# Patient Record
Sex: MALE | Race: WHITE | NOT HISPANIC OR LATINO | Employment: OTHER | URBAN - METROPOLITAN AREA
[De-identification: names, ages, dates, MRNs, and addresses within clinical notes are randomized per-mention and may not be internally consistent; named-entity substitution may affect disease eponyms.]

---

## 2017-09-21 ENCOUNTER — OFFICE VISIT (OUTPATIENT)
Dept: ORTHOPEDIC SURGERY | Facility: CLINIC | Age: 82
End: 2017-09-21

## 2017-09-21 VITALS
DIASTOLIC BLOOD PRESSURE: 63 MMHG | HEART RATE: 59 BPM | WEIGHT: 168 LBS | BODY MASS INDEX: 25.46 KG/M2 | HEIGHT: 68 IN | SYSTOLIC BLOOD PRESSURE: 148 MMHG

## 2017-09-21 DIAGNOSIS — M17.0 PRIMARY OSTEOARTHRITIS OF BOTH KNEES: Primary | ICD-10-CM

## 2017-09-21 DIAGNOSIS — M25.561 PAIN IN BOTH KNEES, UNSPECIFIED CHRONICITY: ICD-10-CM

## 2017-09-21 DIAGNOSIS — F03.90 DEMENTIA WITHOUT BEHAVIORAL DISTURBANCE, UNSPECIFIED DEMENTIA TYPE: ICD-10-CM

## 2017-09-21 DIAGNOSIS — M25.562 PAIN IN BOTH KNEES, UNSPECIFIED CHRONICITY: ICD-10-CM

## 2017-09-21 PROCEDURE — 20610 DRAIN/INJ JOINT/BURSA W/O US: CPT | Performed by: ORTHOPAEDIC SURGERY

## 2017-09-21 PROCEDURE — 99203 OFFICE O/P NEW LOW 30 MIN: CPT | Performed by: ORTHOPAEDIC SURGERY

## 2017-09-21 RX ORDER — AMLODIPINE BESYLATE 2.5 MG/1
2.5 TABLET ORAL DAILY
COMMUNITY
Start: 2017-09-15

## 2017-09-21 RX ORDER — TRIAMCINOLONE ACETONIDE 40 MG/ML
40 INJECTION, SUSPENSION INTRA-ARTICULAR; INTRAMUSCULAR
Status: COMPLETED | OUTPATIENT
Start: 2017-09-21 | End: 2017-09-21

## 2017-09-21 RX ORDER — OMEPRAZOLE 40 MG/1
40 CAPSULE, DELAYED RELEASE ORAL EVERY EVENING
COMMUNITY
Start: 2017-09-15 | End: 2018-04-01 | Stop reason: HOSPADM

## 2017-09-21 RX ORDER — CYANOCOBALAMIN 1000 UG/ML
1000 INJECTION, SOLUTION INTRAMUSCULAR; SUBCUTANEOUS
COMMUNITY
Start: 2017-09-15 | End: 2018-06-26 | Stop reason: HOSPADM

## 2017-09-21 RX ORDER — ATORVASTATIN CALCIUM 10 MG/1
10 TABLET, FILM COATED ORAL EVERY MORNING
COMMUNITY

## 2017-09-21 RX ORDER — LIDOCAINE HYDROCHLORIDE 10 MG/ML
3 INJECTION, SOLUTION INFILTRATION; PERINEURAL
Status: COMPLETED | OUTPATIENT
Start: 2017-09-21 | End: 2017-09-21

## 2017-09-21 RX ORDER — LEVOTHYROXINE SODIUM 0.15 MG/1
175 TABLET ORAL
COMMUNITY
Start: 2017-09-15 | End: 2018-03-20

## 2017-09-21 RX ORDER — ATENOLOL 25 MG/1
25 TABLET ORAL DAILY
COMMUNITY
Start: 2017-07-26

## 2017-09-21 RX ADMIN — LIDOCAINE HYDROCHLORIDE 3 ML: 10 INJECTION, SOLUTION INFILTRATION; PERINEURAL at 09:52

## 2017-09-21 RX ADMIN — LIDOCAINE HYDROCHLORIDE 3 ML: 10 INJECTION, SOLUTION INFILTRATION; PERINEURAL at 09:55

## 2017-09-21 RX ADMIN — TRIAMCINOLONE ACETONIDE 40 MG: 40 INJECTION, SUSPENSION INTRA-ARTICULAR; INTRAMUSCULAR at 09:55

## 2017-09-21 RX ADMIN — TRIAMCINOLONE ACETONIDE 40 MG: 40 INJECTION, SUSPENSION INTRA-ARTICULAR; INTRAMUSCULAR at 09:52

## 2017-09-21 NOTE — PROGRESS NOTES
[facility] [place] [facility] Clinic Note    Subjective     Chief Complaint   Patient presents with   • Right Knee - Pain     Started many years ago.  Pain varies, gets worse with use  Takes Motrin BID, helps at night  Has had PT no improvement after  Has not tried cortisone     • Left Knee - Pain     Started maybe 5 years ago.  Pain varies, gets worse with use  Takes Motrin BID, helps at night  Has had PT no improvement after  Has not tried cortisone        HPI    Dakota Brizuela is a 87 y.o. male. Patient is an 87-year-old male who up to 3 years ago was playing golf.  Patient has had difficulty with his knees right greater than left.  He has tried ibuprofen without a lot of relief.  He was prescribed physical therapy and could not tolerated as this made him worse.  He has tried bracing but this has not been effective and he has difficulty getting the brace is on and off.  He is using a walking stick currently.  He is here with his daughter today.  His knee pain is episodic.  It does not hurt at rest but mainly when he is up bearing weight and active.  He seems to have a lot of night pain.  The knee pain is global and not focal.     Past Medical History:   Diagnosis Date   • Hypertension    • Thyroid disease       Past Surgical History:   Procedure Laterality Date   • APPENDECTOMY     • PROSTATE SURGERY        Family History   Problem Relation Age of Onset   • No Known Problems Mother    • No Known Problems Father    • Heart attack Brother      Social History     Social History   • Marital status:      Spouse name: N/A   • Number of children: N/A   • Years of education: N/A     Occupational History   • Not on file.     Social History Main Topics   • Smoking status: Never Smoker   • Smokeless tobacco: Never Used   • Alcohol use Yes      Comment: 1/2 glass red wine    • Drug use: No   • Sexual activity: Defer     Other Topics Concern   • Not on file     Social History Narrative   • No narrative on file      No  "current outpatient prescriptions on file prior to visit.     No current facility-administered medications on file prior to visit.       Allergies not on file     The following portions of the patient's history were reviewed and updated as appropriate: allergies, current medications, past family history, past medical history, past social history, past surgical history and problem list.    Review of Systems   HENT: Negative.    Eyes: Negative.    Respiratory: Negative.    Cardiovascular: Negative.    Gastrointestinal: Negative.    Endocrine: Negative.    Genitourinary: Negative.    Musculoskeletal: Positive for arthralgias (knees).   Skin: Negative.    Allergic/Immunologic: Negative.    Neurological: Positive for speech difficulty and weakness.   Hematological: Negative.    Psychiatric/Behavioral: Negative.         Objective      Physical Exam  /63  Pulse 59  Ht 67.5\" (171.5 cm)  Wt 168 lb (76.2 kg)  BMI 25.92 kg/m2    Body mass index is 25.92 kg/(m^2).    General  Mental Status - alert  General Appearance - cooperative, well groomed, not in acute distress  Orientation - Oriented X3  Build & Nutrition - well developed and well nourished  Posture - normal posture  Gait - normal gait     Integumentary  Global Assessment  Examination of related systems reveals - no lymphadenopathy  General Characteristics  Overall examination of the patient's skin reveals - no rashes, no evidence of scars, no suspicious lesions and no bruises.  Color - normal coloration of skin.    Ortho Exam  Peripheral Vascular:    Upper Extremity:   Inspection:  Left--no cyanotic nail beds Right--no cyanotic nail beds   Bilateral:  Pink nail beds with brisk capillary refill   Palpation:  Bilateral radial pulse normal    Musculoskeletal:  Global Assessment:  Overall assessment of Lower Extremity Muscle Strength and Tone:  Left quadriceps--5/5   Left hamstrings--5/5       Left tibialis anterior--5/5  Left gastroc-soleus--5/5  Left " EHL--5/5    Right quadriceps--5/5  Right hamstrings--5/5  Right tibialis anterior--5/5  Right gastroc soleus--5/5  Right EHL--5/5    Lower Extremity:  Knee/Patella:  No digital clubbing or cyanosis.    Examination of left and right knees reveals:  Normal deep tendon reflexes, coordination, strength, tone, sensation.  No known fractures or deformities.    Inspection and Palpation:    Left knee:  Tenderness:  Over the medial joint line and moderate severity  Effusion:  1+  Crepitus:  Positive  Pulses:  2+  Ecchymosis:  None  Warmth:  None     Right knee:  Tenderness:  Over the medial joint line and moderate severity  Effusion:  1+  Crepitus:  Positive  Pulses:  2+  Ecchymosis:  None  Warmth:  None     ROM:  Right:  Extension:5    Flexion:120  Left:  Extension:120     Flexion:120    Instability:    Left:  Lachman Test:  Negative, Varus stress test negative, Valgus stress test negative  Right:  Lachman Test:  Negative, Varus stress test negative, Valgus stress test negative    Deformities/Malalignments/Discrepancies:    Left:  Genu Varum   Right:  Genu Varum    Functional Testing:  Right:  Biju's test:  Negative  Patella grind test:  Positive  Q-angle:  Normal    Left:  Biju's test:  Negative  Patella grind test:  Positive  Q-angle:  normal    Imaging/Studies  X-rays of bilateral knees are taken in the office today and reviewed.  Patient has moderate to severe medial and patellofemoral compartment arthritis bilaterally.    Assessment:  1. Primary osteoarthritis of both knees    2. Pain in both knees, unspecified chronicity    3. Dementia without behavioral disturbance, unspecified dementia type        Plan:  1. We've a long discussion with the patient and his daughter today about treatment options and alternatives.  We will continue to use over-the-counter ibuprofen twice a day as long as it does not disturb his GI system.  2. We will inject him with corticosteroid today.  3. Follow-up in 6 weeks and we will  assess his response to the injection.  If he is no better, we can consider other modalities.  4. We will prescribe a rolling walker for him to prevent hopefully a catastrophic fall.      Medical Decision Making  Management Options : over-the-counter medicine and prescription/IM medicine  Data/Risk: radiology tests and independent visualization of imaging, lab tests, or EMG/NCV    Andriy Christiansen MD  09/21/17  9:53 AM

## 2017-09-21 NOTE — PROGRESS NOTES
Procedure   Large Joint Arthrocentesis  Date/Time: 9/21/2017 9:52 AM  Consent given by: patient  Site marked: site marked  Timeout: Immediately prior to procedure a time out was called to verify the correct patient, procedure, equipment, support staff and site/side marked as required   Supporting Documentation  Indications: pain   Procedure Details  Location: knee - L knee  Preparation: Patient was prepped and draped in the usual sterile fashion  Needle size: 25 G  Approach: anterolateral  Medications administered: 3 mL lidocaine 1 %; 40 mg triamcinolone acetonide 40 MG/ML  Patient tolerance: patient tolerated the procedure well with no immediate complications

## 2017-09-21 NOTE — PROGRESS NOTES
Procedure   Large Joint Arthrocentesis  Date/Time: 9/21/2017 9:55 AM  Consent given by: patient  Site marked: site marked  Timeout: Immediately prior to procedure a time out was called to verify the correct patient, procedure, equipment, support staff and site/side marked as required   Supporting Documentation  Indications: pain   Procedure Details  Location: knee - R knee  Preparation: Patient was prepped and draped in the usual sterile fashion  Needle size: 25 G  Approach: anterolateral  Medications administered: 3 mL lidocaine 1 %; 40 mg triamcinolone acetonide 40 MG/ML  Patient tolerance: patient tolerated the procedure well with no immediate complications

## 2017-11-02 ENCOUNTER — OFFICE VISIT (OUTPATIENT)
Dept: ORTHOPEDIC SURGERY | Facility: CLINIC | Age: 82
End: 2017-11-02

## 2017-11-02 VITALS
BODY MASS INDEX: 24.25 KG/M2 | SYSTOLIC BLOOD PRESSURE: 160 MMHG | HEART RATE: 64 BPM | HEIGHT: 68 IN | WEIGHT: 160 LBS | DIASTOLIC BLOOD PRESSURE: 74 MMHG

## 2017-11-02 DIAGNOSIS — M17.11 PRIMARY OSTEOARTHRITIS OF RIGHT KNEE: Primary | ICD-10-CM

## 2017-11-02 PROCEDURE — 20610 DRAIN/INJ JOINT/BURSA W/O US: CPT | Performed by: ORTHOPAEDIC SURGERY

## 2017-11-02 PROCEDURE — 99213 OFFICE O/P EST LOW 20 MIN: CPT | Performed by: ORTHOPAEDIC SURGERY

## 2017-11-02 RX ORDER — HYALURONATE SODIUM 10 MG/ML
20 SYRINGE (ML) INTRAARTICULAR
Status: COMPLETED | OUTPATIENT
Start: 2017-11-02 | End: 2017-11-02

## 2017-11-02 RX ORDER — LIDOCAINE HYDROCHLORIDE 10 MG/ML
3 INJECTION, SOLUTION INFILTRATION; PERINEURAL
Status: COMPLETED | OUTPATIENT
Start: 2017-11-02 | End: 2017-11-02

## 2017-11-02 RX ADMIN — Medication 20 MG: at 11:55

## 2017-11-02 RX ADMIN — LIDOCAINE HYDROCHLORIDE 3 ML: 10 INJECTION, SOLUTION INFILTRATION; PERINEURAL at 11:55

## 2017-11-02 NOTE — PROGRESS NOTES
Procedure   Large Joint Arthrocentesis  Date/Time: 11/2/2017 11:55 AM  Consent given by: patient  Site marked: site marked  Timeout: Immediately prior to procedure a time out was called to verify the correct patient, procedure, equipment, support staff and site/side marked as required   Supporting Documentation  Indications: pain   Procedure Details  Location: knee - R knee  Preparation: Patient was prepped and draped in the usual sterile fashion  Needle size: 22 G  Approach: superior  Medications administered: 3 mL lidocaine 1 %; 20 mg Sodium Hyaluronate 20 MG/2ML  Aspirate: clear (to verify intraarticular placement of the needle)  Patient tolerance: patient tolerated the procedure well with no immediate complications

## 2017-11-02 NOTE — PROGRESS NOTES
"    [facility] [place] Orthopaedic Surgery Clinic Note    Subjective     CC: Follow-up (6 weeks Bilateral knee )      HPI    Dakota Brizuela is a 87 y.o. male. Patient returns for follow-up of his bilateral knee arthritis.  He was injected in the right knee and left knee at his last visit.  His right knee continues to bother him a lot.  He tells me that his left knee never really bothered him.  He has fallen 2-3 days after the right knee was injected.  He is here with his daughter today for further evaluation and treatment.       ROS:    Constiutional:Pt denies fever, chills, nausea, or vomiting.  MSK:as above    Objective      Physical Exam  /74  Pulse 64  Ht 67.5\" (171.5 cm)  Wt 160 lb (72.6 kg)  BMI 24.69 kg/m2    Body mass index is 24.69 kg/(m^2).      Ortho Exam  Patient's right knee has genu varum  Positive crepitance with range of motion  No obvious joint line tenderness medially or laterally.  5 out of 5 strength of his extensor mechanism.  Patella is nontender although there is a bruise in the central aspect of the knee.    Imaging/Studies  None    Assessment:  1. Primary osteoarthritis of right knee        Plan:  1. We will begin a course of Euflexxa today  2. We have offered a brace and we have agreed that this would not be practical for him as he would have difficulty getting it on and off  3. Continue over-the-counter medication as needed for discomfort.  4. Follow-up in one week for Euflexxa No. 2.      Medical Decision Making  Management Options : over-the-counter medicine and prescription/IM medicine      Andriy Christiasnen MD  11/02/17  6:21 PM  "

## 2017-11-09 ENCOUNTER — CLINICAL SUPPORT (OUTPATIENT)
Dept: ORTHOPEDIC SURGERY | Facility: CLINIC | Age: 82
End: 2017-11-09

## 2017-11-09 DIAGNOSIS — M17.11 PRIMARY OSTEOARTHRITIS OF RIGHT KNEE: Primary | ICD-10-CM

## 2017-11-09 PROCEDURE — 20610 DRAIN/INJ JOINT/BURSA W/O US: CPT | Performed by: PHYSICIAN ASSISTANT

## 2017-11-09 RX ORDER — HYALURONATE SODIUM 10 MG/ML
20 SYRINGE (ML) INTRAARTICULAR
Status: COMPLETED | OUTPATIENT
Start: 2017-11-09 | End: 2017-11-09

## 2017-11-09 RX ADMIN — Medication 20 MG: at 14:46

## 2017-11-09 NOTE — PROGRESS NOTES
Subjective     Follow-up (1 week - #2 Euflexxa injection Rt knee)      Dakota Brizuela is a 87 y.o. male.     History of Present Illness   Patient comes in for the second injection of Euflexxa in the right knee.  He has tolerated previous injections well.  He reports no change in his knee pain.  No Known Allergies  Current Outpatient Prescriptions on File Prior to Visit   Medication Sig Dispense Refill   • amLODIPine (NORVASC) 2.5 MG tablet      • atenolol (TENORMIN) 25 MG tablet      • atorvastatin (LIPITOR) 10 MG tablet Take 10 mg by mouth Daily.     • cyanocobalamin 1000 MCG/ML injection      • levothyroxine (SYNTHROID, LEVOTHROID) 150 MCG tablet      • omeprazole (priLOSEC) 40 MG capsule        No current facility-administered medications on file prior to visit.      Social History     Social History   • Marital status:      Spouse name: N/A   • Number of children: N/A   • Years of education: N/A     Occupational History   • Not on file.     Social History Main Topics   • Smoking status: Never Smoker   • Smokeless tobacco: Never Used   • Alcohol use Yes      Comment: 1/2 glass red wine    • Drug use: No   • Sexual activity: Defer     Other Topics Concern   • Not on file     Social History Narrative     Past Surgical History:   Procedure Laterality Date   • APPENDECTOMY     • PROSTATE SURGERY       Family History   Problem Relation Age of Onset   • No Known Problems Mother    • No Known Problems Father    • Heart attack Brother      Past Medical History:   Diagnosis Date   • Hypertension    • Thyroid disease          Review of Systems    The following portions of the patient's history were reviewed and updated as appropriate: allergies, current medications, past family history, past medical history, past social history, past surgical history and problem list.    Ortho Exam      Medical Decision Making    Assessment and Plan/ Diagnosis/Treatment options:   Right knee arthritis undergoing Euflexxa series.   Patient is tolerated previous injections well.  No change knee pain.  Plan is proceed with second injection in the right knee today.  He'll return in 1 week or sooner if needed.    Using sterile technique, the right knee was sterilely prepped with Hibiclens.  Following time out using a 22 gauge needle, the right knee was aspirated and then injected with 2 ml Euflexxa.  Approximately 0.5 cc of straw-colored fluid was obtained.  Patient tolerated the procedure well. No complications

## 2017-11-09 NOTE — PROGRESS NOTES
Procedure   Large Joint Arthrocentesis  Date/Time: 11/9/2017 2:46 PM  Consent given by: patient  Site marked: site marked  Timeout: Immediately prior to procedure a time out was called to verify the correct patient, procedure, equipment, support staff and site/side marked as required   Supporting Documentation  Indications: pain   Procedure Details  Location: knee - R knee  Needle size: 22 G  Approach: anterolateral  Medications administered: 20 mg Sodium Hyaluronate 20 MG/2ML  Patient tolerance: patient tolerated the procedure well with no immediate complications

## 2017-11-16 ENCOUNTER — CLINICAL SUPPORT (OUTPATIENT)
Dept: ORTHOPEDIC SURGERY | Facility: CLINIC | Age: 82
End: 2017-11-16

## 2017-11-16 DIAGNOSIS — M17.11 PRIMARY OSTEOARTHRITIS OF RIGHT KNEE: Primary | ICD-10-CM

## 2017-11-16 PROCEDURE — 20610 DRAIN/INJ JOINT/BURSA W/O US: CPT | Performed by: PHYSICIAN ASSISTANT

## 2017-11-16 RX ORDER — HYALURONATE SODIUM 10 MG/ML
20 SYRINGE (ML) INTRAARTICULAR
Status: COMPLETED | OUTPATIENT
Start: 2017-11-16 | End: 2017-11-16

## 2017-11-16 RX ADMIN — Medication 20 MG: at 14:54

## 2017-11-16 NOTE — PROGRESS NOTES
Procedure   Large Joint Arthrocentesis  Date/Time: 11/16/2017 2:54 PM  Consent given by: patient  Site marked: site marked  Timeout: Immediately prior to procedure a time out was called to verify the correct patient, procedure, equipment, support staff and site/side marked as required   Supporting Documentation  Indications: pain   Procedure Details  Location: knee - R knee  Preparation: Patient was prepped and draped in the usual sterile fashion  Needle size: 22 G  Approach: anterolateral  Medications administered: 20 mg Sodium Hyaluronate 20 MG/2ML  Patient tolerance: patient tolerated the procedure well with no immediate complications

## 2017-11-16 NOTE — PROGRESS NOTES
Subjective     Injections (Right knee Euflexxa Injection #3)      Dakota Brizuela is a 87 y.o. male.     History of Present Illness   Patient returns today for the third injection of Euflexxa in the right knee.  He has tolerated previous injections well.  He reports no change in his knee pain.  No new symptoms.  No Known Allergies  Current Outpatient Prescriptions on File Prior to Visit   Medication Sig Dispense Refill   • amLODIPine (NORVASC) 2.5 MG tablet      • atenolol (TENORMIN) 25 MG tablet      • atorvastatin (LIPITOR) 10 MG tablet Take 10 mg by mouth Daily.     • cyanocobalamin 1000 MCG/ML injection      • levothyroxine (SYNTHROID, LEVOTHROID) 150 MCG tablet      • omeprazole (priLOSEC) 40 MG capsule        No current facility-administered medications on file prior to visit.      Social History     Social History   • Marital status:      Spouse name: N/A   • Number of children: N/A   • Years of education: N/A     Occupational History   • Not on file.     Social History Main Topics   • Smoking status: Never Smoker   • Smokeless tobacco: Never Used   • Alcohol use Yes      Comment: 1/2 glass red wine    • Drug use: No   • Sexual activity: Defer     Other Topics Concern   • Not on file     Social History Narrative     Past Surgical History:   Procedure Laterality Date   • APPENDECTOMY     • PROSTATE SURGERY       Family History   Problem Relation Age of Onset   • No Known Problems Mother    • No Known Problems Father    • Heart attack Brother      Past Medical History:   Diagnosis Date   • Hypertension    • Thyroid disease          Review of Systems    The following portions of the patient's history were reviewed and updated as appropriate: allergies, current medications, past family history, past medical history, past social history, past surgical history and problem list.    Ortho Exam      Medical Decision Making    Assessment and Plan/ Diagnosis/Treatment options:   Right knee arthritis undergoing  Euflexxa series patient is tolerated previous injections well.  Plan is to finish series in the right knee today he'll return in 6 weeks to see how he has progressed and discussed further treatment if needed.    Using sterile technique, the right knee was sterilely prepped with Hibiclens.  Following time out using a 22 gauge needle, the right knee was aspirated and then injected with 2 ml Euflexxa.  Approximately 0.5 cc of straw-colored fluid was obtained.  Patient tolerated the procedure well. No complications

## 2018-01-03 ENCOUNTER — OFFICE VISIT (OUTPATIENT)
Dept: ORTHOPEDIC SURGERY | Facility: CLINIC | Age: 83
End: 2018-01-03

## 2018-01-03 DIAGNOSIS — M17.11 PRIMARY OSTEOARTHRITIS OF RIGHT KNEE: Primary | ICD-10-CM

## 2018-01-03 PROCEDURE — 99212 OFFICE O/P EST SF 10 MIN: CPT | Performed by: PHYSICIAN ASSISTANT

## 2018-01-03 NOTE — PROGRESS NOTES
[facility] [place] [facility] Clinic Note    Subjective     Patient: Dakota Brizuela  : 1930    Primary Care Provider: Sam Hernandez MD    Requesting Provider: As above    Follow-up (7 weeks - Right knee osteoarthritis)      History    Chief Complaint: Follow-up right knee arthritis after Euflexxa 6 weeks ago.    History of Present Illness: Patient returns for routine follow-up after a first series of Euflexxa finishing 6 weeks ago.  Patient reports he has had improvement knee pain since finishing the series.  He reports that he did have better outcome with Euflexxa than he did with the steroid injection.  He continues to have some intermittent medial knee pain, however, it has improved.  No new symptoms.    Current Outpatient Prescriptions on File Prior to Visit   Medication Sig Dispense Refill   • amLODIPine (NORVASC) 2.5 MG tablet      • atenolol (TENORMIN) 25 MG tablet      • atorvastatin (LIPITOR) 10 MG tablet Take 10 mg by mouth Daily.     • cyanocobalamin 1000 MCG/ML injection      • levothyroxine (SYNTHROID, LEVOTHROID) 150 MCG tablet 175 mcg.     • omeprazole (priLOSEC) 40 MG capsule        No current facility-administered medications on file prior to visit.       No Known Allergies   Past Medical History:   Diagnosis Date   • Hypertension    • Thyroid disease      Past Surgical History:   Procedure Laterality Date   • APPENDECTOMY     • PROSTATE SURGERY       Family History   Problem Relation Age of Onset   • No Known Problems Mother    • No Known Problems Father    • Heart attack Brother       Social History     Social History   • Marital status:      Spouse name: N/A   • Number of children: N/A   • Years of education: N/A     Occupational History   • Not on file.     Social History Main Topics   • Smoking status: Never Smoker   • Smokeless tobacco: Never Used   • Alcohol use Yes      Comment: 1/2 glass red wine    • Drug use: No   • Sexual activity: Defer     Other Topics  Concern   • Not on file     Social History Narrative        Review of Systems    The following portions of the patient's history were reviewed and updated as appropriate: allergies, current medications, past family history, past medical history, past social history, past surgical history and problem list.      Objective      Physical Exam  There were no vitals taken for this visit.    There is no height or weight on file to calculate BMI.      Ortho Exam    Right Knee Exam  ----------  ALIGNMENT: Right: varus----------  RANGE OF MOTION:  Right: 0-120  LIGAMENTOUS STABILITY:   Right:stable to varus and valgus stress at terminal extension and 30 degrees without any evidence of laxity----------  STRENGTH:  KNEE FLEXION Right 5/5  KNEE EXTENSION Right 5/5 ----------  PAIN WITH PALPATION: Right medial joint line  PAIN WITH KNEE ROM: Right no  PATELLAR CREPITUS: Right yes   ----------  SENSATION TO LIGHT TOUCH:  DEEP PERONEAL/SUPERFICIAL PERONEAL/SURAL/SAPHENOUS/TIBIAL:   Right intact----------  EDEMA:  Right:  no;  ERYTHEMA:  Right: no;  WOUNDS/INCISIONS: none, no overlying skin problems.      Medical Decision Making    Data Review:   none    Assessment:  1. Primary osteoarthritis of right knee        Plan:  Doing well with nonoperative treatment of right knee arthritis.  I reviewed clinical findings, past and current treatment with the patient and his daughter today.  On exam, has mild medial joint line tenderness.  He reports improved pain since finishing the Euflexxa series.  We reviewed further treatment options including steroid injection between Euflexxa series.  I explained the Euflexxa every 6 months if it improves his pain.  He'll continue using the walker at home.  He also takes ibuprofen as needed.  Plan today is that he continue his current regimen and return to see us as needed.      Payton Peng PA-C  01/03/18  3:38 PM

## 2018-03-20 ENCOUNTER — APPOINTMENT (OUTPATIENT)
Dept: GENERAL RADIOLOGY | Facility: HOSPITAL | Age: 83
End: 2018-03-20

## 2018-03-20 ENCOUNTER — APPOINTMENT (OUTPATIENT)
Dept: CT IMAGING | Facility: HOSPITAL | Age: 83
End: 2018-03-20

## 2018-03-20 ENCOUNTER — HOSPITAL ENCOUNTER (INPATIENT)
Facility: HOSPITAL | Age: 83
LOS: 4 days | Discharge: NURSING, REHAB OR LONG-TERM CARE | End: 2018-03-24
Attending: EMERGENCY MEDICINE | Admitting: INTERNAL MEDICINE

## 2018-03-20 DIAGNOSIS — R13.13 PHARYNGEAL DYSPHAGIA: ICD-10-CM

## 2018-03-20 DIAGNOSIS — T79.6XXA TRAUMATIC RHABDOMYOLYSIS, INITIAL ENCOUNTER (HCC): Primary | ICD-10-CM

## 2018-03-20 DIAGNOSIS — G23.1 PROGRESSIVE SUPRANUCLEAR PALSY (HCC): ICD-10-CM

## 2018-03-20 DIAGNOSIS — Z74.09 IMPAIRED MOBILITY AND ADLS: ICD-10-CM

## 2018-03-20 DIAGNOSIS — R26.2 INABILITY TO WALK: ICD-10-CM

## 2018-03-20 DIAGNOSIS — Z78.9 IMPAIRED MOBILITY AND ADLS: ICD-10-CM

## 2018-03-20 DIAGNOSIS — Z74.09 IMPAIRED FUNCTIONAL MOBILITY, BALANCE, GAIT, AND ENDURANCE: ICD-10-CM

## 2018-03-20 PROBLEM — I10 HYPERTENSION: Status: ACTIVE | Noted: 2018-03-20

## 2018-03-20 PROBLEM — R73.9 HYPERGLYCEMIA: Status: ACTIVE | Noted: 2018-03-20

## 2018-03-20 PROBLEM — D72.829 LEUKOCYTOSIS: Status: ACTIVE | Noted: 2018-03-20

## 2018-03-20 PROBLEM — K21.9 GERD (GASTROESOPHAGEAL REFLUX DISEASE): Status: ACTIVE | Noted: 2018-03-20

## 2018-03-20 PROBLEM — G93.41 ACUTE METABOLIC ENCEPHALOPATHY: Status: ACTIVE | Noted: 2018-03-20

## 2018-03-20 PROBLEM — R77.8 ELEVATED TROPONIN: Status: ACTIVE | Noted: 2018-03-20

## 2018-03-20 PROBLEM — R79.89 LOW TSH LEVEL: Status: ACTIVE | Noted: 2018-03-20

## 2018-03-20 PROBLEM — D64.9 NORMOCYTIC ANEMIA: Status: ACTIVE | Noted: 2018-03-20

## 2018-03-20 PROBLEM — E78.5 HYPERLIPIDEMIA: Status: ACTIVE | Noted: 2018-03-20

## 2018-03-20 LAB
ALBUMIN SERPL-MCNC: 4.3 G/DL (ref 3.2–4.8)
ALBUMIN/GLOB SERPL: 1.6 G/DL (ref 1.5–2.5)
ALP SERPL-CCNC: 69 U/L (ref 25–100)
ALT SERPL W P-5'-P-CCNC: 41 U/L (ref 7–40)
ANION GAP SERPL CALCULATED.3IONS-SCNC: 11 MMOL/L (ref 3–11)
AST SERPL-CCNC: 88 U/L (ref 0–33)
BACTERIA UR QL AUTO: ABNORMAL /HPF
BASOPHILS # BLD AUTO: 0.01 10*3/MM3 (ref 0–0.2)
BASOPHILS NFR BLD AUTO: 0.1 % (ref 0–1)
BILIRUB SERPL-MCNC: 0.6 MG/DL (ref 0.3–1.2)
BILIRUB UR QL STRIP: NEGATIVE
BUN BLD-MCNC: 31 MG/DL (ref 9–23)
BUN/CREAT SERPL: 23.8 (ref 7–25)
CALCIUM SPEC-SCNC: 9.5 MG/DL (ref 8.7–10.4)
CHLORIDE SERPL-SCNC: 107 MMOL/L (ref 99–109)
CK SERPL-CCNC: 2845 U/L (ref 26–174)
CLARITY UR: ABNORMAL
CO2 SERPL-SCNC: 24 MMOL/L (ref 20–31)
COLOR UR: YELLOW
CREAT BLD-MCNC: 1.3 MG/DL (ref 0.6–1.3)
D-LACTATE SERPL-SCNC: 2.4 MMOL/L (ref 0.5–2)
DEPRECATED RDW RBC AUTO: 44.9 FL (ref 37–54)
EOSINOPHIL # BLD AUTO: 0 10*3/MM3 (ref 0–0.3)
EOSINOPHIL NFR BLD AUTO: 0 % (ref 0–3)
ERYTHROCYTE [DISTWIDTH] IN BLOOD BY AUTOMATED COUNT: 13.3 % (ref 11.3–14.5)
FLUAV SUBTYP SPEC NAA+PROBE: NOT DETECTED
FLUBV RNA ISLT QL NAA+PROBE: NOT DETECTED
GFR SERPL CREATININE-BSD FRML MDRD: 52 ML/MIN/1.73
GLOBULIN UR ELPH-MCNC: 2.7 GM/DL
GLUCOSE BLD-MCNC: 135 MG/DL (ref 70–100)
GLUCOSE UR STRIP-MCNC: NEGATIVE MG/DL
HCT VFR BLD AUTO: 37.3 % (ref 38.9–50.9)
HGB BLD-MCNC: 12.2 G/DL (ref 13.1–17.5)
HGB UR QL STRIP.AUTO: ABNORMAL
HOLD SPECIMEN: NORMAL
HYALINE CASTS UR QL AUTO: ABNORMAL /LPF
IMM GRANULOCYTES # BLD: 0.04 10*3/MM3 (ref 0–0.03)
IMM GRANULOCYTES NFR BLD: 0.3 % (ref 0–0.6)
KETONES UR QL STRIP: ABNORMAL
LEUKOCYTE ESTERASE UR QL STRIP.AUTO: NEGATIVE
LYMPHOCYTES # BLD AUTO: 0.49 10*3/MM3 (ref 0.6–4.8)
LYMPHOCYTES NFR BLD AUTO: 3.5 % (ref 24–44)
MAGNESIUM SERPL-MCNC: 2.2 MG/DL (ref 1.3–2.7)
MCH RBC QN AUTO: 30.3 PG (ref 27–31)
MCHC RBC AUTO-ENTMCNC: 32.7 G/DL (ref 32–36)
MCV RBC AUTO: 92.8 FL (ref 80–99)
MONOCYTES # BLD AUTO: 1.34 10*3/MM3 (ref 0–1)
MONOCYTES NFR BLD AUTO: 9.7 % (ref 0–12)
MYOGLOBIN SERPL-MCNC: 3861 NG/ML (ref 3–110)
NEUTROPHILS # BLD AUTO: 11.94 10*3/MM3 (ref 1.5–8.3)
NEUTROPHILS NFR BLD AUTO: 86.4 % (ref 41–71)
NITRITE UR QL STRIP: NEGATIVE
PH UR STRIP.AUTO: <=5 [PH] (ref 5–8)
PLATELET # BLD AUTO: 291 10*3/MM3 (ref 150–450)
PMV BLD AUTO: 10.6 FL (ref 6–12)
POTASSIUM BLD-SCNC: 3.7 MMOL/L (ref 3.5–5.5)
PROCALCITONIN SERPL-MCNC: 0.07 NG/ML
PROCALCITONIN SERPL-MCNC: 0.09 NG/ML
PROT SERPL-MCNC: 7 G/DL (ref 5.7–8.2)
PROT UR QL STRIP: ABNORMAL
RBC # BLD AUTO: 4.02 10*6/MM3 (ref 4.2–5.76)
RBC # UR: ABNORMAL /HPF
REF LAB TEST METHOD: ABNORMAL
SODIUM BLD-SCNC: 142 MMOL/L (ref 132–146)
SP GR UR STRIP: 1.03 (ref 1–1.03)
SQUAMOUS #/AREA URNS HPF: ABNORMAL /HPF
T4 FREE SERPL-MCNC: 1.66 NG/DL (ref 0.89–1.76)
TROPONIN I SERPL-MCNC: 0.05 NG/ML
TROPONIN I SERPL-MCNC: 0.08 NG/ML
TSH SERPL DL<=0.05 MIU/L-ACNC: 0.14 MIU/ML (ref 0.35–5.35)
UROBILINOGEN UR QL STRIP: ABNORMAL
WBC NRBC COR # BLD: 13.82 10*3/MM3 (ref 3.5–10.8)
WBC UR QL AUTO: ABNORMAL /HPF

## 2018-03-20 PROCEDURE — 84443 ASSAY THYROID STIM HORMONE: CPT | Performed by: EMERGENCY MEDICINE

## 2018-03-20 PROCEDURE — G0378 HOSPITAL OBSERVATION PER HR: HCPCS

## 2018-03-20 PROCEDURE — 83874 ASSAY OF MYOGLOBIN: CPT | Performed by: EMERGENCY MEDICINE

## 2018-03-20 PROCEDURE — 84484 ASSAY OF TROPONIN QUANT: CPT | Performed by: PHYSICIAN ASSISTANT

## 2018-03-20 PROCEDURE — 83735 ASSAY OF MAGNESIUM: CPT | Performed by: EMERGENCY MEDICINE

## 2018-03-20 PROCEDURE — 87502 INFLUENZA DNA AMP PROBE: CPT | Performed by: EMERGENCY MEDICINE

## 2018-03-20 PROCEDURE — 84439 ASSAY OF FREE THYROXINE: CPT | Performed by: EMERGENCY MEDICINE

## 2018-03-20 PROCEDURE — 73560 X-RAY EXAM OF KNEE 1 OR 2: CPT

## 2018-03-20 PROCEDURE — 99285 EMERGENCY DEPT VISIT HI MDM: CPT

## 2018-03-20 PROCEDURE — 71045 X-RAY EXAM CHEST 1 VIEW: CPT

## 2018-03-20 PROCEDURE — 85025 COMPLETE CBC W/AUTO DIFF WBC: CPT | Performed by: EMERGENCY MEDICINE

## 2018-03-20 PROCEDURE — 87040 BLOOD CULTURE FOR BACTERIA: CPT | Performed by: EMERGENCY MEDICINE

## 2018-03-20 PROCEDURE — 99223 1ST HOSP IP/OBS HIGH 75: CPT | Performed by: FAMILY MEDICINE

## 2018-03-20 PROCEDURE — 80053 COMPREHEN METABOLIC PANEL: CPT | Performed by: EMERGENCY MEDICINE

## 2018-03-20 PROCEDURE — 84484 ASSAY OF TROPONIN QUANT: CPT | Performed by: EMERGENCY MEDICINE

## 2018-03-20 PROCEDURE — 70450 CT HEAD/BRAIN W/O DYE: CPT

## 2018-03-20 PROCEDURE — 84145 PROCALCITONIN (PCT): CPT | Performed by: PHYSICIAN ASSISTANT

## 2018-03-20 PROCEDURE — 82550 ASSAY OF CK (CPK): CPT | Performed by: EMERGENCY MEDICINE

## 2018-03-20 PROCEDURE — 72170 X-RAY EXAM OF PELVIS: CPT

## 2018-03-20 PROCEDURE — 93005 ELECTROCARDIOGRAM TRACING: CPT | Performed by: EMERGENCY MEDICINE

## 2018-03-20 PROCEDURE — 84145 PROCALCITONIN (PCT): CPT | Performed by: EMERGENCY MEDICINE

## 2018-03-20 PROCEDURE — P9612 CATHETERIZE FOR URINE SPEC: HCPCS

## 2018-03-20 PROCEDURE — 83605 ASSAY OF LACTIC ACID: CPT | Performed by: EMERGENCY MEDICINE

## 2018-03-20 PROCEDURE — 81001 URINALYSIS AUTO W/SCOPE: CPT | Performed by: EMERGENCY MEDICINE

## 2018-03-20 RX ORDER — ATORVASTATIN CALCIUM 10 MG/1
10 TABLET, FILM COATED ORAL EVERY MORNING
Status: DISCONTINUED | OUTPATIENT
Start: 2018-03-21 | End: 2018-03-24 | Stop reason: HOSPADM

## 2018-03-20 RX ORDER — LEVOTHYROXINE SODIUM 175 UG/1
175 TABLET ORAL DAILY
COMMUNITY

## 2018-03-20 RX ORDER — SODIUM CHLORIDE 9 MG/ML
100 INJECTION, SOLUTION INTRAVENOUS CONTINUOUS
Status: DISCONTINUED | OUTPATIENT
Start: 2018-03-20 | End: 2018-03-23

## 2018-03-20 RX ORDER — ACETAMINOPHEN 325 MG/1
650 TABLET ORAL EVERY 4 HOURS PRN
Status: DISCONTINUED | OUTPATIENT
Start: 2018-03-20 | End: 2018-03-24 | Stop reason: HOSPADM

## 2018-03-20 RX ORDER — PANTOPRAZOLE SODIUM 40 MG/1
40 TABLET, DELAYED RELEASE ORAL
Status: DISCONTINUED | OUTPATIENT
Start: 2018-03-21 | End: 2018-03-24 | Stop reason: HOSPADM

## 2018-03-20 RX ORDER — SODIUM CHLORIDE 0.9 % (FLUSH) 0.9 %
1-10 SYRINGE (ML) INJECTION AS NEEDED
Status: DISCONTINUED | OUTPATIENT
Start: 2018-03-20 | End: 2018-03-24 | Stop reason: HOSPADM

## 2018-03-20 RX ORDER — LEVOTHYROXINE SODIUM 0.07 MG/1
150 TABLET ORAL
Status: DISCONTINUED | OUTPATIENT
Start: 2018-03-21 | End: 2018-03-24 | Stop reason: HOSPADM

## 2018-03-20 RX ORDER — AMLODIPINE BESYLATE 2.5 MG/1
2.5 TABLET ORAL DAILY
Status: DISCONTINUED | OUTPATIENT
Start: 2018-03-21 | End: 2018-03-24 | Stop reason: HOSPADM

## 2018-03-20 RX ORDER — SODIUM CHLORIDE 0.9 % (FLUSH) 0.9 %
10 SYRINGE (ML) INJECTION AS NEEDED
Status: DISCONTINUED | OUTPATIENT
Start: 2018-03-20 | End: 2018-03-24 | Stop reason: HOSPADM

## 2018-03-20 RX ORDER — ATENOLOL 25 MG/1
25 TABLET ORAL DAILY
Status: DISCONTINUED | OUTPATIENT
Start: 2018-03-21 | End: 2018-03-23

## 2018-03-20 RX ORDER — SODIUM CHLORIDE 9 MG/ML
100 INJECTION, SOLUTION INTRAVENOUS CONTINUOUS
Status: DISCONTINUED | OUTPATIENT
Start: 2018-03-20 | End: 2018-03-21

## 2018-03-20 RX ORDER — SODIUM CHLORIDE 9 MG/ML
125 INJECTION, SOLUTION INTRAVENOUS CONTINUOUS
Status: DISCONTINUED | OUTPATIENT
Start: 2018-03-20 | End: 2018-03-21

## 2018-03-20 RX ADMIN — SODIUM CHLORIDE 100 ML/HR: 9 INJECTION, SOLUTION INTRAVENOUS at 21:58

## 2018-03-20 RX ADMIN — SODIUM CHLORIDE 1000 ML: 9 INJECTION, SOLUTION INTRAVENOUS at 16:35

## 2018-03-20 NOTE — ED PROVIDER NOTES
Subjective   Dakota Brizuela is a 87 y.o.male who presents to the ED s/p fall which occurred at 0330 today. The pt reports that he woke up earlier than normal this morning and suffered a fall secondary to his inability to use his walker. Family states that they found the pt lying on the floor at which point EMS was dispatched. Family states that the pt was down for at least 12 hours in the semi-prone position on a carpeted floor. Family also states that the pt developed abrasions to his bilateral lower extremities from crawling on the floor. Family also reports that the pt has developed more confusion than baseline and difficulty speaking more than baseline. The pt denies headache, or any other complaints at this time. Family notes that the pt's health has been deteriorating significantly within the past year.        History provided by:  EMS personnel, patient and relative  Fall   Mechanism of injury: fall    Incident location:  Home  Fall:     Fall occurred:  Unable to specify    Impact surface:  Carpet    Point of impact:  Unable to specify  Protective equipment: none    Suspicion of alcohol use: no    Suspicion of drug use: no    Tetanus status:  Unknown  Prior to arrival data:     Bystander interventions:  None    Blood loss:  None    Loss of consciousness: no      Amnesic to event: no    Associated symptoms: no headaches        Review of Systems   Musculoskeletal: Negative for arthralgias.   Skin:        Abrasions to bilateral lower extremities.    Neurological: Positive for speech difficulty. Negative for headaches.   Psychiatric/Behavioral: Positive for confusion.   All other systems reviewed and are negative.      Past Medical History:   Diagnosis Date   • Hypertension    • Thyroid disease        No Known Allergies    Past Surgical History:   Procedure Laterality Date   • APPENDECTOMY     • PROSTATE SURGERY         Family History   Problem Relation Age of Onset   • No Known Problems Mother    • No Known  Problems Father    • Heart attack Brother        Social History     Social History   • Marital status:      Social History Main Topics   • Smoking status: Never Smoker   • Smokeless tobacco: Never Used   • Alcohol use Yes      Comment: 1/2 glass red wine    • Drug use: No   • Sexual activity: Defer     Other Topics Concern   • Not on file         Objective   Physical Exam   Constitutional: He is oriented to person, place, and time. He appears well-developed and well-nourished. No distress.   HENT:   Head: Normocephalic and atraumatic.   Nose: Nose normal.   Scalp non tender.   Eyes: Conjunctivae are normal. No scleral icterus.   Small conjunctival hemorrhage to left eye.   Neck: Normal range of motion. Neck supple.   Cardiovascular: Normal rate, regular rhythm and normal heart sounds.    No murmur heard.  Pulmonary/Chest: Effort normal and breath sounds normal. No respiratory distress.   Musculoskeletal:   Pelvis stable. Hips have normal ROM. Pt has symmetric strength in bilateral upper and lower extremities. Strong . C-spine and clavicle are non tender. No bony tenderness.   Neurological: He is alert and oriented to person, place, and time.   Answers questions appropriately.   Skin: Skin is warm and dry.   Abrasions to bilateral knees.   Nursing note and vitals reviewed.      Procedures         ED Course  ED Course     EKG nearly unreadable due to tremor artifact.  Pt's daughter arrived, says he has PSP and has been having trouble walking and talking of late.  Labs reveal rhabdomyolysis and overcorrected thyroid but otherwise benign.  XRs negative for injury.  Patient stable on serial rechecks.  Discussed exam findings, test results so far and concerns in detail at the bedside.  Discussed need for admission for further evaluation and treatment.                  MDM  Number of Diagnoses or Management Options  Inability to walk:   Progressive supranuclear palsy:   Traumatic rhabdomyolysis, initial  encounter:      Amount and/or Complexity of Data Reviewed  Clinical lab tests: ordered and reviewed  Tests in the radiology section of CPT®: ordered and reviewed  Decide to obtain previous medical records or to obtain history from someone other than the patient: yes  Obtain history from someone other than the patient: yes  Discuss the patient with other providers: yes  Independent visualization of images, tracings, or specimens: yes        Final diagnoses:   Traumatic rhabdomyolysis, initial encounter   Inability to walk   Progressive supranuclear palsy       Documentation assistance provided by claudia Friedman.  Information recorded by the claudia was done at my direction and has been verified and validated by me.     Chato Friedman  03/20/18 1658       Chato Friedman  03/20/18 1751       Chato Friedman  03/20/18 1821       Jeremiah Dunlap MD  03/20/18 9114

## 2018-03-21 ENCOUNTER — APPOINTMENT (OUTPATIENT)
Dept: GENERAL RADIOLOGY | Facility: HOSPITAL | Age: 83
End: 2018-03-21
Attending: HOSPITALIST

## 2018-03-21 LAB
ANION GAP SERPL CALCULATED.3IONS-SCNC: 5 MMOL/L (ref 3–11)
BUN BLD-MCNC: 24 MG/DL (ref 9–23)
BUN/CREAT SERPL: 20 (ref 7–25)
CA-I SERPL ISE-MCNC: 1.21 MMOL/L (ref 1.12–1.32)
CALCIUM SPEC-SCNC: 8.7 MG/DL (ref 8.7–10.4)
CHLORIDE SERPL-SCNC: 115 MMOL/L (ref 99–109)
CK SERPL-CCNC: 3677 U/L (ref 26–174)
CO2 SERPL-SCNC: 24 MMOL/L (ref 20–31)
CREAT BLD-MCNC: 1.2 MG/DL (ref 0.6–1.3)
D-LACTATE SERPL-SCNC: 0.9 MMOL/L (ref 0.5–2)
DEPRECATED RDW RBC AUTO: 46.2 FL (ref 37–54)
ERYTHROCYTE [DISTWIDTH] IN BLOOD BY AUTOMATED COUNT: 13.5 % (ref 11.3–14.5)
GFR SERPL CREATININE-BSD FRML MDRD: 57 ML/MIN/1.73
GLUCOSE BLD-MCNC: 97 MG/DL (ref 70–100)
HBA1C MFR BLD: 5.8 % (ref 4.8–5.6)
HCT VFR BLD AUTO: 33 % (ref 38.9–50.9)
HGB BLD-MCNC: 10.7 G/DL (ref 13.1–17.5)
MCH RBC QN AUTO: 30.4 PG (ref 27–31)
MCHC RBC AUTO-ENTMCNC: 32.4 G/DL (ref 32–36)
MCV RBC AUTO: 93.8 FL (ref 80–99)
PLATELET # BLD AUTO: 255 10*3/MM3 (ref 150–450)
PMV BLD AUTO: 10.1 FL (ref 6–12)
POTASSIUM BLD-SCNC: 3.4 MMOL/L (ref 3.5–5.5)
POTASSIUM BLD-SCNC: 3.6 MMOL/L (ref 3.5–5.5)
RBC # BLD AUTO: 3.52 10*6/MM3 (ref 4.2–5.76)
SODIUM BLD-SCNC: 144 MMOL/L (ref 132–146)
TROPONIN I SERPL-MCNC: 0.09 NG/ML
WBC NRBC COR # BLD: 9.66 10*3/MM3 (ref 3.5–10.8)

## 2018-03-21 PROCEDURE — 83605 ASSAY OF LACTIC ACID: CPT | Performed by: PHYSICIAN ASSISTANT

## 2018-03-21 PROCEDURE — 97530 THERAPEUTIC ACTIVITIES: CPT

## 2018-03-21 PROCEDURE — 82550 ASSAY OF CK (CPK): CPT | Performed by: HOSPITALIST

## 2018-03-21 PROCEDURE — 83036 HEMOGLOBIN GLYCOSYLATED A1C: CPT | Performed by: PHYSICIAN ASSISTANT

## 2018-03-21 PROCEDURE — 92610 EVALUATE SWALLOWING FUNCTION: CPT

## 2018-03-21 PROCEDURE — 99233 SBSQ HOSP IP/OBS HIGH 50: CPT | Performed by: HOSPITALIST

## 2018-03-21 PROCEDURE — 84132 ASSAY OF SERUM POTASSIUM: CPT | Performed by: HOSPITALIST

## 2018-03-21 PROCEDURE — 74230 X-RAY XM SWLNG FUNCJ C+: CPT

## 2018-03-21 PROCEDURE — 97116 GAIT TRAINING THERAPY: CPT

## 2018-03-21 PROCEDURE — 82330 ASSAY OF CALCIUM: CPT | Performed by: HOSPITALIST

## 2018-03-21 PROCEDURE — 84484 ASSAY OF TROPONIN QUANT: CPT | Performed by: PHYSICIAN ASSISTANT

## 2018-03-21 PROCEDURE — 25010000002 HEPARIN (PORCINE) PER 1000 UNITS: Performed by: HOSPITALIST

## 2018-03-21 PROCEDURE — 85027 COMPLETE CBC AUTOMATED: CPT | Performed by: PHYSICIAN ASSISTANT

## 2018-03-21 PROCEDURE — 97162 PT EVAL MOD COMPLEX 30 MIN: CPT

## 2018-03-21 PROCEDURE — 80048 BASIC METABOLIC PNL TOTAL CA: CPT | Performed by: HOSPITALIST

## 2018-03-21 PROCEDURE — 92611 MOTION FLUOROSCOPY/SWALLOW: CPT

## 2018-03-21 PROCEDURE — 25010000002 PIPERACILLIN-TAZOBACTAM: Performed by: FAMILY MEDICINE

## 2018-03-21 PROCEDURE — 97166 OT EVAL MOD COMPLEX 45 MIN: CPT

## 2018-03-21 RX ORDER — HEPARIN SODIUM 5000 [USP'U]/ML
5000 INJECTION, SOLUTION INTRAVENOUS; SUBCUTANEOUS EVERY 8 HOURS SCHEDULED
Status: DISCONTINUED | OUTPATIENT
Start: 2018-03-21 | End: 2018-03-24 | Stop reason: HOSPADM

## 2018-03-21 RX ORDER — FINASTERIDE 5 MG/1
5 TABLET, FILM COATED ORAL DAILY
Status: DISCONTINUED | OUTPATIENT
Start: 2018-03-21 | End: 2018-03-24 | Stop reason: HOSPADM

## 2018-03-21 RX ORDER — POTASSIUM CHLORIDE 1.5 G/1.77G
40 POWDER, FOR SOLUTION ORAL AS NEEDED
Status: DISCONTINUED | OUTPATIENT
Start: 2018-03-21 | End: 2018-03-23

## 2018-03-21 RX ORDER — POTASSIUM CHLORIDE 750 MG/1
40 CAPSULE, EXTENDED RELEASE ORAL AS NEEDED
Status: DISCONTINUED | OUTPATIENT
Start: 2018-03-21 | End: 2018-03-23

## 2018-03-21 RX ORDER — TAMSULOSIN HYDROCHLORIDE 0.4 MG/1
0.4 CAPSULE ORAL DAILY
Status: DISCONTINUED | OUTPATIENT
Start: 2018-03-21 | End: 2018-03-24 | Stop reason: HOSPADM

## 2018-03-21 RX ADMIN — PANTOPRAZOLE SODIUM 40 MG: 40 TABLET, DELAYED RELEASE ORAL at 14:15

## 2018-03-21 RX ADMIN — BARIUM SULFATE 10 ML: 400 PASTE ORAL at 12:27

## 2018-03-21 RX ADMIN — AMLODIPINE BESYLATE 2.5 MG: 2.5 TABLET ORAL at 14:14

## 2018-03-21 RX ADMIN — POTASSIUM CHLORIDE 40 MEQ: 750 CAPSULE, EXTENDED RELEASE ORAL at 18:19

## 2018-03-21 RX ADMIN — TAMSULOSIN HYDROCHLORIDE 0.4 MG: 0.4 CAPSULE ORAL at 18:19

## 2018-03-21 RX ADMIN — LEVOTHYROXINE SODIUM 150 MCG: 75 TABLET ORAL at 14:15

## 2018-03-21 RX ADMIN — BARIUM SULFATE 55 ML: 0.81 POWDER, FOR SUSPENSION ORAL at 12:27

## 2018-03-21 RX ADMIN — HEPARIN SODIUM 5000 UNITS: 5000 INJECTION, SOLUTION INTRAVENOUS; SUBCUTANEOUS at 20:39

## 2018-03-21 RX ADMIN — SODIUM CHLORIDE 200 ML/HR: 9 INJECTION, SOLUTION INTRAVENOUS at 14:16

## 2018-03-21 RX ADMIN — ATORVASTATIN CALCIUM 10 MG: 10 TABLET, FILM COATED ORAL at 14:15

## 2018-03-21 RX ADMIN — TAZOBACTAM SODIUM AND PIPERACILLIN SODIUM 4.5 G: .5; 4 INJECTION, POWDER, LYOPHILIZED, FOR SOLUTION INTRAVENOUS at 01:25

## 2018-03-21 RX ADMIN — TAZOBACTAM SODIUM AND PIPERACILLIN SODIUM 4.5 G: .5; 4 INJECTION, POWDER, LYOPHILIZED, FOR SOLUTION INTRAVENOUS at 15:28

## 2018-03-21 RX ADMIN — TAZOBACTAM SODIUM AND PIPERACILLIN SODIUM 4.5 G: .5; 4 INJECTION, POWDER, LYOPHILIZED, FOR SOLUTION INTRAVENOUS at 05:49

## 2018-03-21 RX ADMIN — FINASTERIDE 5 MG: 5 TABLET, FILM COATED ORAL at 18:19

## 2018-03-21 RX ADMIN — POTASSIUM CHLORIDE 40 MEQ: 750 CAPSULE, EXTENDED RELEASE ORAL at 14:16

## 2018-03-21 NOTE — H&P
"    [facility] Medicine Services  HISTORY AND PHYSICAL    Patient Name: Dakota Brizuela  : 1930  MRN: [id number]  Primary Care Physician: Sam Hernandez MD    Subjective   Subjective     Chief Complaint:  AMS    HPI:  Dakota Brizuela is an 87 y.o. male with a past medical history significant for hypertension, HLD, hypothyroidism, and PSP who presents s/p fall early this morning around 0330. HPI limited secondary to patient disposition. Patient lives at home alone. Daughter at bedside states she came over to check on patient this morning after leaving him the night before. States she found him on the floor in a semi-prone position.The patient apparently revealed to his daughter that he lost balance with his walker and subsequently sustained a mechanical fall onto a carpeted floor.  He had diffuse bruising from crawling all night and was down for approximately 12 hours. Daughter also endorses acute change in mental status. States he is hallucinating and \"seeing things on the ceiling\". She explains that patient has suffered functional and cognitive decline over the past year and has admitted that he may need to start using a wheelchair. However, when speaking to medical staff, patient is ambivalent to reveal hardships and per daughter \"tells half truths\" as he is afraid of permanent placement. No complaints of cough, congestion, fever, chest pain, SOB, dysuria, or N/V/D. Patient also denies syncope or focal deficits. Will admit for further evaluation and treatment.    Emergency Department Evaluation; Ck elevated to 2800. Myoglobin 3800. Troponin 0.05. Blood glucose increased to 135. TSH 0.144. Lactic acid 2.4. EBC 14,000. H/H low at 12.2 and 37.3. UA negative. EKG without acute changes but with artifact secondary to tremor.    Review of Systems (limited per patient disposition)      Otherwise 10-system ROS reviewed and is negative except as mentioned in the HPI.    Personal " History     Past Medical History:   Diagnosis Date   • Hypertension    • Thyroid disease        Past Surgical History:   Procedure Laterality Date   • APPENDECTOMY     • PROSTATE SURGERY         Family History: family history includes Heart attack in his brother; No Known Problems in his father and mother.     Social History:  reports that he has never smoked. He has never used smokeless tobacco. He reports that he drinks alcohol. He reports that he does not use drugs.  Social History     Social History Narrative   • No narrative on file       Medications:    (Not in a hospital admission)    No Known Allergies    Objective   Objective     Vital Signs:   Temp:  [99.5 °F (37.5 °C)] 99.5 °F (37.5 °C)  Heart Rate:  [102] 102  Resp:  [20] 20  BP: (112-162)/(64-88) 131/80        Physical Exam   Constitutional: No acute distress, awake, alert, disoriented, speech difficulty  Eyes: PERRLA, sclerae anicteric, no conjunctival injection  HENT: NCAT, mucous membranes moist  Neck: Supple, no thyromegaly, no lymphadenopathy, trachea midline  Respiratory: Clear to auscultation bilaterally, nonlabored respirations   Cardiovascular: RRR, no murmurs, rubs, or gallops, palpable pedal pulses bilaterally  Gastrointestinal: Positive bowel sounds, soft, nontender, nondistended  Musculoskeletal: No bilateral ankle edema, no clubbing or cyanosis to extremities  Psychiatric: Appropriate affect, cooperative  Neurologic: Oriented x 3, strength symmetric in all extremities, Cranial Nerves grossly intact to confrontation, speech clear  Skin: No rashes      Results Reviewed:  I have personally reviewed current lab, radiology, and data and agree.      Results from last 7 days  Lab Units 03/20/18  1633   WBC 10*3/mm3 13.82*   HEMOGLOBIN g/dL 12.2*   HEMATOCRIT % 37.3*   PLATELETS 10*3/mm3 291       Results from last 7 days  Lab Units 03/20/18  1633   SODIUM mmol/L 142   POTASSIUM mmol/L 3.7   CHLORIDE mmol/L 107   CO2 mmol/L 24.0   BUN mg/dL 31*    CREATININE mg/dL 1.30   GLUCOSE mg/dL 135*   CALCIUM mg/dL 9.5   ALT (SGPT) U/L 41*   AST (SGOT) U/L 88*   TROPONIN I ng/mL 0.051*     Estimated Creatinine Clearance: 43.7 mL/min (by C-G formula based on SCr of 1.3 mg/dL).  Brief Urine Lab Results  (Last result in the past 365 days)      Color   Clarity   Blood   Leuk Est   Nitrite   Protein   CREAT   Urine HCG        03/20/18 1731 Yellow Cloudy(A) Large (3+)(A) Negative Negative 30 mg/dL (1+)(A)             No results found for: BNP  No results found for: PHART  Imaging Results (last 24 hours)     Procedure Component Value Units Date/Time    XR Knee 1 or 2 View Bilateral [[id number]] Collected:  03/20/18 1736     Updated:  03/20/18 1737    Narrative:          EXAMINATION: XR KNEE 1 OR 2 VW BILATERAL-      INDICATION: fall onto knees      COMPARISON: 9/21/2017     FINDINGS: Bipartite patella with smooth cortical margins noted posterior  lateral segment unchanged from 9/21/2017. No acute fracture or osseous  malalignment is observed with tricompartmental degenerative disease  including osteophytosis and joint space narrowing greatest in the  patellofemoral and medial femorotibial compartments.   Small to moderate  suprapatellar joint effusion.           Impression:       No acute osseous abnormality with extensive background  degenerative changes and joint space narrowing in the medial  femorotibial compartment along with redemonstration of bipartite  patella. Small to moderate suprapatellar joint effusion.          XR Pelvis 1 or 2 View [[id number]] Collected:  03/20/18 1734     Updated:  03/20/18 1736    Narrative:          EXAMINATION: XR PELVIS 1 OR 2 VW-      INDICATION: fall from standing      COMPARISON: NONE     FINDINGS: No displaced pelvic ring fracture with SI joints and pubic  symphysis well approximated. Femoral heads are well-seated within the  acetabular cups. No discrete fracture within the proximal femurs.  Background degenerative changes of the SI  joints and bilateral hip  joints.       Impression:       No acute osseous abnormality specifically no displaced  pelvic ring fracture. Background extensive degenerative changes.                   Assessment/Plan   Assessment / Plan     Hospital Problem List     * (Principal)Traumatic rhabdomyolysis    PSP (progressive supranuclear palsy)    Hyperglycemia    Leukocytosis    Hypertension    GERD (gastroesophageal reflux disease)    Hyperlipidemia    Low TSH level    Normocytic anemia            Assessment & Plan:  1. Acute metabolic encephalopathy secondary to traumatic rhabdomyolysis:  - Ct head. Fall unwitnessed. UA negative. CXR pending. - CXR positive for bibasilar infiltrates worrisome for aspiration - will add Zosyn and speech eval, NPO for now.  - suspect an exacerbation of pre-existing neurological disorder; PSP  - administered 1L bolus in Ed. Continue IVF with saline 100cc/hr  - PT/OT treat and eval  - case management; will need placement or acute rehab  - am labs, recheck lactic acid and Ck, continue to trend troponin    2. Hyperglycemia:  - likely reactive to acute trauma. No history of Dm. Check A1c    3. Leukocytosis:  - likely reactive to acute trauma. UA negative. CXR pending. - see above  - check procal    4. Hypertension:  - stable. Continue home meds.    5. Low TSH level:  - overcorrection of hypothyroid. On levothyroxine 0.175 mg  - consider decreasing dose to 0.150 and recheck in 4-6 weeks    6 normocytic anemia:  - normocytic anemia. Stable, hemoglobin at baseline      DVT prophylaxis: mechanical    CODE STATUS:  Full code    Admission Status:  I believe this patient meets INPATIENT status due to the need for care which can only be reasonably provided in an hospital setting such as aggressive/expedited ancillary services and/or consultation services, the necessity for IV medications, close physician monitoring and/or the possible need for procedures.  In such, I feel patient’s risk for adverse  outcomes and need for care warrant INPATIENT evaluation and predict the patient’s care encounter to likely last beyond 2 midnights.      Electronically signed by Myron Montemayor PA-C, 03/20/18, 8:09 PM.      Brief Attending Admission Attestation     I have seen and examined the patient, performing an independent face-to-face diagnostic evaluation with plan of care reviewed and developed with the advanced practice clinician MACK Montemayor PA-C.      Brief Summary Statement/HPI:   Dakota Brizuela is a 87 y.o.  male with PMH significant for PSP,  HTN, HLD, and hypothyroidism who is brought to [facility] after being found on the carpeted floor of his bedroom by his daughter at about 3:30 PM.  By her best estimate, he had likely been there for about 12 hours, as he usually gets up to void at about 3 or 3:30 AM.  He was prone near his bed.  In the ER, his CK was 2845, lactate 2.4, WBC 12.8K, procal 0.07, TSH 0.144 and troponin 0.051.  Upon further discussion with the patient's daughter, she states that every time he falls, he was found to have PNA.  She says he coughs frequently and has swallowing issues due to his PSP.      Attending Physical Exam:  Constitutional: No acute distress, awake, alert  Eyes: PERRLA, sclerae anicteric, no conjunctival injection  HENT: NCAT, mucous membranes dry  Neck: Supple, no thyromegaly, no lymphadenopathy, trachea midline  Respiratory: Decreased auscultation bilaterally, nonlabored respirations   Cardiovascular: RRR, palpable pedal pulses bilaterally  Gastrointestinal: Positive bowel sounds, soft, nontender, nondistended  Musculoskeletal: No bilateral ankle edema, no clubbing or cyanosis to extremities  Psychiatric: Appropriate affect, cooperative  Neurologic: Oriented x 3, strength symmetric in all extremities, Cranial Nerves grossly intact to confrontation, speech halting, very few words.  Skin: Knee abrasions        Brief Assessment/Plan :  See above for further detailed assessment and  plan developed with APC which I have reviewed and/or edited.      Electronically signed by Daly Good MD, 03/20/18, 10:58 PM.

## 2018-03-21 NOTE — PLAN OF CARE
Problem: Patient Care Overview  Goal: Plan of Care Review  Outcome: Ongoing (interventions implemented as appropriate)   03/21/18 9511   Coping/Psychosocial   Plan of Care Reviewed With patient;daughter   OTHER   Outcome Summary Patient ambulated 100 feet with RW, requiring min assist d/t instability with mobility. Demonstrates LE weakness and impaired sitting and standing balance. Will progress mobility and balance training, strengthening as able. Recommend IRF upon d/c.    Plan of Care Review   Progress improving

## 2018-03-21 NOTE — SIGNIFICANT NOTE
RN noted +sepsis screen with tachycardia and leukocytosis. Won't count urine as negative, has rhabdo. The white count reactive as well likely. May be tachy r/t fluid status which are ordered. NNO. Monitor.

## 2018-03-21 NOTE — PLAN OF CARE
Problem: Patient Care Overview  Goal: Plan of Care Review  Outcome: Ongoing (interventions implemented as appropriate)   03/21/18 8670   Coping/Psychosocial   Plan of Care Reviewed With patient     SLP evaluation completed. Will address mild pharyngeal dysphagia by ensuring use of compensatory strategies, assessing diet tolerance, and providing further edu re: PSP and dysphagia. Swallowing exercises contraindicated. Please see note for further details and recommendations.

## 2018-03-21 NOTE — MBS/VFSS/FEES
Acute Care - Speech Language Pathology   Swallow Initial Evaluation [facility]   Clinical Swallow Evaluation  & Modified Barium Swallow Study (MBS)     Patient Name: Dakota Brizuela  : 1930  MRN: [id number]  Today's Date: 3/21/2018  Onset of Illness/Injury or Date of Surgery: 18     Referring Physician: ANDREI Montemayor      Admit Date: 3/20/2018    Visit Dx:     ICD-10-CM ICD-9-CM   1. Traumatic rhabdomyolysis, initial encounter T79.6XXA 958.6   2. Inability to walk R26.2 719.7   3. Progressive supranuclear palsy G23.1 333.0   4. Impaired mobility and ADLs Z74.09 799.89   5. Pharyngeal dysphagia R13.13 787.23     Patient Active Problem List   Diagnosis   • Primary osteoarthritis of right knee   • Hypertension   • GERD (gastroesophageal reflux disease)   • Hyperlipidemia   • PSP (progressive supranuclear palsy)   • Traumatic rhabdomyolysis   • Hyperglycemia   • Leukocytosis   • Low TSH level   • Normocytic anemia   • Acute metabolic encephalopathy   • Elevated troponin     Past Medical History:   Diagnosis Date   • Hypertension    • Thyroid disease      Past Surgical History:   Procedure Laterality Date   • APPENDECTOMY     • PROSTATE SURGERY            SWALLOW EVALUATION (last 72 hours)      SLP Adult Swallow Evaluation     Row Name 18 0930                   Rehab Evaluation    Document Type evaluation  -AC        Subjective Information no complaints  -AC        Patient Observations alert;cooperative  -AC        Patient/Family Observations No family present.  -AC        Patient Effort good  -AC           General Information    Patient Profile Reviewed yes  -AC        Pertinent History Of Current Problem Adm w/ traumatic rhabdomyolysis. Hx PSP, GERD, ophthalmoplegia. CXR suspicious for bibasilar asp pna. Full code, lives alone. Typically able to take care of ADLs, including feeding himself independently. Much weaker now, difficulty feeding himself.   -AC        Current Method of Nutrition NPO  -AC         Precautions/Limitations, Vision vision impairment, bilaterally;other (see comments)   ophthalmoplegia, fxnl for eval purposes  -AC        Precautions/Limitations, Hearing WFL;for purposes of eval  -AC        Prior Level of Function-Communication motor speech impairment;other (see comments)   dysarthria r/t PSP  -AC        Prior Level of Function-Swallowing no diet consistency restrictions  -AC        Plans/Goals Discussed with patient;agreed upon  -AC        Barriers to Rehab medically complex  -AC        Patient's Goals for Discharge return to PO diet;eat/drink without coughing/choking  -AC           Pain Assessment    Additional Documentation Pain Scale: FACES Pre/Post-Treatment (Group)  -AC           Pain Scale: FACES Pre/Post-Treatment    Pain: FACES Scale, Pretreatment 0-->no hurt  -AC        Pain: FACES Scale, Post-Treatment 0-->no hurt  -AC           Oral Motor and Function    Dentition Assessment natural, present and adequate  -AC        Secretion Management WNL/WFL  -AC        Mucosal Quality dry;sticky  -AC        Volitional Swallow WFL  -AC        Volitional Cough weak  -AC           Oral Musculature and Cranial Nerve Assessment    Oral Motor General Assessment generalized oral motor weakness  -AC           General Eating/Swallowing Observations    Eating/Swallowing Skills self-fed;fed by SLP;other (see comments)   pt required assistance to use spoon & drink via cup  -AC        Positioning During Eating upright 90 degree;upright in chair  -AC        Utensils Used spoon;cup;straw  -AC        Consistencies Trialed regular textures;pureed;thin liquids  -AC           Clinical Swallow Eval    Oral Prep Phase impaired  -AC        Clinical Swallow Evaluation Summary No overt clinical s/sxs aspiration w/ any consistency trialed; however, given concern for aspiration pna, acute weakness, & hx of PSP, rec'd MBS to further assess swallow fxn & r/o aspiration (please see MBS results below).  -AC           Oral  Prep Concerns    Oral Prep Concerns increased prep time  -AC        Increased Prep Time regular consistencies  -AC        Oral Prep Concerns, Comment Increased prep time, though adequate  -AC           MBS/VFSS    Utensils Used spoon;cup;straw  -AC        Consistencies Trialed regular textures;pureed;thin liquids  -AC           MBS/VFSS Interpretation    Oral Prep Phase impaired oral phase of swallowing  -AC           Oral Preparatory Phase    Oral Preparatory Phase prolonged manipulation;other (see comments)  -AC        Prolonged Manipulation regular textures;secondary to reduced lingual strength  -AC        Oral Preparatory Phase, Comment Incr'd oral prep w/ solids; however, mastication was adequate  -AC           Initiation of Pharyngeal Swallow    Initiation of Pharyngeal Swallow bolus in valleculae  -AC        Pharyngeal Phase impaired pharyngeal phase of swallowing  -AC        Penetration During the Swallow thin liquids;secondary to delayed swallow initiation or mistiming;secondary to reduced vestibular closure;other (see comments)   w/ thin liquid via consecutive straw drinks only  -AC        Response to Penetration shallow;no response  -AC        Rosenbek's Scale thin:;3--->level 3;pudding/puree:;regular textures:;1--->level 1  -AC        Attempted Compensatory Maneuvers bolus size;bolus presentation style  -AC        Response to Attempted Compensatory Maneuvers prevented penetration;prevented aspiration  -AC        Pharyngeal Phase, Comment Penetration during the swallow w/ thin liquid via consecutive straw drinks 2' delayed pharyngeal swallow initiation and reduced vestibular closure. Reduced hyolaryngeal excursion. Vestibular residue did not expel w/ completion of the swallow. No penetration/aspiration w/ thin liquid via tsp, thin via cup, and thin via small/single straw sips, pudding, and cracker coated in pudding. No significant pharyngeal residue noted.   -AC           Clinical Impression    SLP  Swallowing Diagnosis mild;pharyngeal dysfunction  -        Functional Impact risk of aspiration/pneumonia;risk of malnutrition;risk of dehydration  -        Rehab Potential/Prognosis, Swallowing good, to achieve stated therapy goals  -        Criteria for Skilled Therapeutic Interventions Met demonstrates skilled criteria  -           Recommendations    Therapy Frequency (SLP) PRN  -        Predicted Duration Therapy Intervention (Days) until discharge  -        SLP Diet Recommendation soft textures;chopped;thin liquids  -        Recommended Precautions and Strategies upright posture during/after eating;small bites of food and sips of liquid;other (see comments)   small sips, may try adaptive cup, monitor for fatigue  -        SLP Rec. for Method of Medication Administration meds whole;with thin liquids;with pudding or applesauce;as tolerated  -        Monitor for Signs of Aspiration yes;notify SLP if any concerns;cough;throat clearing;gurgly voice  -        Anticipated Dischage Disposition unknown  -          User Key  (r) = Recorded By, (t) = Taken By, (c) = Cosigned By    Initials Name Effective Dates     Tatiana Freeman, MS CCC-SLP 07/27/17 -         EDUCATION  The patient has been educated in the following areas:   Dysphagia (Swallowing Impairment) Oral Care/Hydration Modified Diet Instruction.    SLP Recommendation and Plan  SLP Swallowing Diagnosis: mild, pharyngeal dysfunction  SLP Diet Recommendation: soft textures, chopped, thin liquids  Recommended Precautions and Strategies: upright posture during/after eating, small bites of food and sips of liquid, other (see comments) (small sips, may try adaptive cup, monitor for fatigue)  Monitor for Signs of Aspiration: yes, notify SLP if any concerns, cough, throat clearing, gurgly voice   Medications administered whole w/ small sips of thin liquid or whole w/ puree (applesauce, pudding)  Oral care BID & PRN  Criteria for Skilled Therapeutic  Interventions Met: demonstrates skilled criteria  Anticipated Dischage Disposition: unknown  Rehab Potential/Prognosis, Swallowing: good, to achieve stated therapy goals  Therapy Frequency (SLP): PRN  Predicted Duration Therapy Intervention (Days): until discharge       Plan of Care Reviewed With: patient  Plan of Care Review  Plan of Care Reviewed With: patient          SLP GOALS     Row Name 03/21/18 0930             Oral Nutrition/Hydration Goal 1 (SLP)    Oral Nutrition/Hydration Goal 1, SLP LTG: Pt will tolerate regular diet texture and thin liquids w/o s/sxs aspiration or complication w/ 100% acc w/o cues.  -AC      Time Frame (Oral Nutrition/Hydration Goal 1, SLP) by discharge  -AC         Swallow Management Recall Goal (SLP)    Swallow Management Recall Goal (SLP) Pt will tolerate trials of solid items and thin liquid w/o s/sxs aspiration or complication w/o cues.  -AC      Time Frame (Swallow Management Recall Goal, SLP) short term goal (STG);by discharge  -AC         Swallow Compensatory Strategies Goal 1 (SLP)    Activity (Swallow Compensatory Strategies/Techniques Goal 1, SLP) compensatory strategies;small straw sips;during p.o. trials;during meal intake  -AC      [place]/Accuracy (Swallow Compensatory Strategies/Techniques Goal 1, SLP) independently (over 90% accuracy)  -AC      Time Frame (Swallow Compensatory Strategies/Techniques Goal 1, SLP) short term goal (STG);by discharge  -        User Key  (r) = Recorded By, (t) = Taken By, (c) = Cosigned By    Initials Name Provider Type    ALICJA Freeman MS CCC-SLP Speech and Language Pathologist               Time Calculation:         Time Calculation- SLP     Row Name 03/21/18 1451             Time Calculation- SLP    SLP Start Time 0930  -      SLP Received On 03/21/18  -        User Key  (r) = Recorded By, (t) = Taken By, (c) = Cosigned By    Initials Name Provider Type    ALICJA Freeman MS CCC-SLP Speech and Language Pathologist           Therapy Charges for Today     Code Description Service Date Service Provider Modifiers Qty    00473475795 HC ST EVAL ORAL PHARYNG SWALLOW 4 3/21/2018 Tatiana Freeman, MS CCC-SLP GN 1    29138129884 HC ST MOTION FLUORO EVAL SWALLOW 5 3/21/2018 Tatiana Freeman, MS CCC-SLP GN 1               Tatiana Freeman, MS CCC-SLP  3/21/2018

## 2018-03-21 NOTE — PROGRESS NOTES
Discharge Planning Assessment  [facility]     Patient Name: Dakota Brizuela  MRN: [id number]  Today's Date: 3/21/2018    Admit Date: 3/20/2018          Discharge Needs Assessment     Row Name 03/21/18 1326       Living Environment    Lives With alone    Current Living Arrangements home/apartment/condo    Primary Care Provided by self    Provides Primary Care For no one, unable/limited ability to care for self    Family Caregiver if Needed child(kojo), adult    Family Caregiver Names Nicci Brizuela    Quality of Family Relationships involved;supportive    Able to Return to Prior Arrangements no       Resource/Environmental Concerns    Resource/Environmental Concerns none       Transition Planning    Patient/Family Anticipates Transition to inpatient rehabilitation facility    Patient/Family Anticipated Services at Transition     Transportation Anticipated family or friend will provide       Discharge Needs Assessment    Readmission Within the Last 30 Days no previous admission in last 30 days    Concerns to be Addressed discharge planning    Equipment Currently Used at Home walker, rolling;shower chair    Anticipated Changes Related to Illness inability to care for self    Equipment Needed After Discharge none    Discharge Facility/Level of Care Needs acute rehab    Current Discharge Risk chronically ill            Discharge Plan     Row Name 03/21/18 7032       Plan    Plan Placement    Patient/Family in Agreement with Plan yes    Plan Comments Spoke with patient daughter at [facility].  Patient resting.  He lives alone in [place] [place] [facility].  Prior to admission, he was independent with ADL's, using a rolling walker to assist with ambualtion.  Patient also has a shower chair at home and has never had home health services.  Mr. Brizuela has RX coverage and has his scripts filled at [facility].  Therapy recommending inpatient rehab at discharge.  Patient's daughter agreeable.  She prefers [facility] since patient has been  there before.  Referral called to Felicita with [facility].  CM will continue to follow.  Antoinette Vasquez RN [phone]        Destination     No service coordination in this encounter.      Durable Medical Equipment     No service coordination in this encounter.      Dialysis/Infusion     No service coordination in this encounter.      Home Medical Care     No service coordination in this encounter.      Social Care     No service coordination in this encounter.        Expected Discharge Date and Time     Expected Discharge Date Expected Discharge Time    Mar 26, 2018               Demographic Summary     Row Name 03/21/18 1325       General Information    Admission Type inpatient    Arrived From home    Referral Source admission list    Reason for Consult discharge planning    Preferred Language English     Used During This Interaction no    General Information Comments PCP-Don Hernandez            Functional Status     Row Name 03/21/18 1325       Functional Status    Usual Activity Tolerance fair    Current Activity Tolerance fair       Functional Status, IADL    Medications assistive person    Meal Preparation assistive person    Housekeeping completely dependent    Laundry completely dependent    Shopping completely dependent            Psychosocial    No documentation.           Abuse/Neglect    No documentation.           Legal     Row Name 03/21/18 1326       Financial/Legal    Finance Comments Verified with patient's daughter that he has Medicare/[employer].  No issues obtaining medications.            Substance Abuse    No documentation.           Patient Forms    No documentation.         Antoinette Vasquez RN

## 2018-03-21 NOTE — PLAN OF CARE
Problem: Patient Care Overview  Goal: Plan of Care Review  Outcome: Ongoing (interventions implemented as appropriate)   03/21/18 1038   Coping/Psychosocial   Plan of Care Reviewed With patient   OTHER   Outcome Summary Pt current evolving symptoms along with PMH and progressive dx have decreased ADL and mobility I. Pt currently mod and max x 2 for txfrs and mobility. Pt is max assist for ADL's. Pt will need IRF at discharge as pt was I with ADL's.

## 2018-03-21 NOTE — THERAPY EVALUATION
Acute Care - Occupational Therapy Initial Evaluation  [facility]     Patient Name: Dakota Brizuela  : 1930  MRN: [id number]  Today's Date: 3/21/2018  Onset of Illness/Injury or Date of Surgery: 18  Date of Referral to OT: 18  Referring Physician: ANDREI Montemayor    Admit Date: 3/20/2018       ICD-10-CM ICD-9-CM   1. Traumatic rhabdomyolysis, initial encounter T79.6XXA 958.6   2. Inability to walk R26.2 719.7   3. Progressive supranuclear palsy G23.1 333.0   4. Impaired mobility and ADLs Z74.09 799.89     Patient Active Problem List   Diagnosis   • Primary osteoarthritis of right knee   • Hypertension   • GERD (gastroesophageal reflux disease)   • Hyperlipidemia   • PSP (progressive supranuclear palsy)   • Traumatic rhabdomyolysis   • Hyperglycemia   • Leukocytosis   • Low TSH level   • Normocytic anemia   • Acute metabolic encephalopathy   • Elevated troponin     Past Medical History:   Diagnosis Date   • Hypertension    • Thyroid disease      Past Surgical History:   Procedure Laterality Date   • APPENDECTOMY     • PROSTATE SURGERY            OT ASSESSMENT FLOWSHEET (last 72 hours)      Occupational Therapy Evaluation     Row Name 18 0925                   OT Evaluation Time/Intention    Subjective Information complains of;weakness  -AN        Document Type evaluation  -AN        Mode of Treatment occupational therapy  -AN        Patient Effort good  -AN           General Information    Patient Profile Reviewed? yes  -AN        Onset of Illness/Injury or Date of Surgery 18  -AN        Referring Physician ANDREI Montemayor  -AN        Patient Observations alert;cooperative  -AN        General Observations of Patient Pt supine in bed,exit alarm, SCDS, eyes closed  -AN        Prior Level of Function independent:;ADL's;bed mobility;all household mobility;dependent:;cleaning;shopping   simple meal prep, laundry pt completes  -AN        Equipment Currently Used at Home bath bench;rollator;commode  -AN         Pertinent History of Current Functional Problem Pt. fell at home and was found approx 12 hrs later still on floor. Pt also with confusion,. Pt found to have rhabdomylosis and sepsis. Pt with history of Supranuclear palsy affecting speech.  -AN        Existing Precautions/Restrictions fall  -AN        Limitations/Impairments visual;other (see comments)   see vision section  -AN        Risks Reviewed patient:;LOB;dizziness;increased discomfort;change in vital signs  -AN        Benefits Reviewed patient:;improve function;increase independence;increase strength;increase balance  -AN        Barriers to Rehab medically complex;previous functional deficit  -AN           Relationship/Environment    Primary Source of Support/Comfort --   pt report adult children live in area and sees them every da  -AN        Lives With alone  -AN           Resource/Environmental Concerns    Current Living Arrangements home/apartment/condo  -AN           Home Main Entrance    Number of Stairs, Main Entrance one  -AN           Cognitive Assessment/Intervention- PT/OT    Orientation Status (Cognition) oriented to;person;situation   knew year, unable to identify where he was  -AN        Follows Commands (Cognition) 50-74% accuracy;increased processing time needed;repetition of directions required;verbal cues/prompting required  -AN        Safety Deficit (Cognitive) moderate deficit;insight into deficits/self awareness;problem solving  -AN           Bed Mobility Assessment/Treatment    Bed Mobility Assessment/Treatment supine-sit  -AN        Supine-Sit [place] (Bed Mobility) verbal cues;minimum assist (75% patient effort)  -AN        Bed Mobility, Safety Issues decreased use of arms for pushing/pulling;decreased use of legs for bridging/pushing;impaired trunk control for bed mobility  -AN        Assistive Device (Bed Mobility) draw sheet;head of bed elevated;bed rails  -AN        Comment (Bed Mobility) min assist for scooting to EOB  and sitting up  -AN           Functional Mobility    Functional Mobility- Ind. Level unable to perform  -AN           Transfer Assessment/Treatment    Transfer Assessment/Treatment sit-stand transfer;toilet transfer;stand pivot/stand step transfer;stand-sit transfer  -AN        Stand-Sit [place] (Transfers) minimum assist (75% patient effort)  -AN        [place] Level (Toilet Transfer) moderate assist (50% patient effort);2 person assist  -AN        Assistive Device (Toilet Transfer) commode, bedside without drop arms  -AN           Stand Pivot/Stand Step Transfer    Stand Pivot/Stand Step [place] minimum assist (75% patient effort);verbal cues  -AN           Toilet Transfer    Type (Toilet Transfer) stand pivot/stand step  -AN           ADL Assessment/Intervention    BADL Assessment/Intervention toileting;lower body dressing;upper body dressing  -AN           Upper Body Dressing Assessment/Training    Upper Body Dressing [place] Level don;maximum assist (25% patient effort)  -AN        Upper Body Dressing Position edge of bed sitting  -AN           Lower Body Dressing Assessment/Training    Lower Body Dressing [place] Level don;socks;maximum assist (25% patient effort)  -AN        Lower Body Dressing Position supine  -AN           Toileting Assessment/Training    [place] Level (Toileting) maximum assist (25% patient effort);perform perineal hygiene  -AN        Assistive Devices (Toileting) commode, bedside without drop arms  -AN        Toileting Position supported standing  -AN           BADL Safety/Performance    Impairments, BADL Safety/Performance balance;cognition;endurance/activity tolerance;trunk/postural control  -AN        Cognitive Impairments, BADL Safety/Performance problem solving/reasoning;insight into deficits/self awareness  -AN           General ROM    GENERAL ROM COMMENTS WFL ERASMO UE ROM  -AN           MMT (Manual Muscle Testing)    Additional Documentation General  Assessment (Manual Muscle Testing) (Group)  -AN           General Assessment (Manual Muscle Testing)    General Manual Muscle Testing (MMT) Assessment upper extremity strength deficits identified  -AN        Comment, General Manual Muscle Testing (MMT) Assessment R UE grossly 4/5, L shoulder and elbow 4-/5, mikey  hands 4/5   -AN           Motor Assessment/Interventions    Additional Documentation Balance (Group);Gross Motor Coordination (Group);Fine Motor Testing & Training (Group)  -AN           Gross Motor Coordination    Gross Motor Impairments finger to nose;coordination  -AN        Gross Motor Skill, Impairments Detail Pt impaired Mikey finger to nose, moderate on L UE; pt is L hand dominant; SLP noted difficulty with spoon to mouth with L Ue  -AN           Balance    Balance static standing balance;static sitting balance  -AN           Static Sitting Balance    Level of [place] (Unsupported Sitting, Static Balance) minimal assist, 75% patient effort  -AN        Sitting Position (Unsupported Sitting, Static Balance) sitting on edge of bed  -AN        Time Able to Maintain Position (Unsupported Sitting, Static Balance) 1 to 2 minutes  -AN           Static Standing Balance    Level of [place] (Supported Standing, Static Balance) moderate assist, 50 to 74% patient effort   x 2 assist  -AN        Time Able to Maintain Position (Supported Standing, Static Balance) 45 to 60 seconds  -AN           Sensory Assessment/Intervention    Additional Documentation Vision Assessment/Intervention (Group)  -AN           Vision Assessment/Intervention    Visual Impairment/Limitations other (see comments)   Pt with SNP, decreased muscle activation in Mikey eyes  -AN        Visual Field Deficit other (see comments)   uses head + eye movment to track, compensates  -AN        Visual Motor Impairment visual tracking, down;visual tracking, left;visual tracking, right;visual tracking, up;other (see comments)  -AN            Positioning and Restraints    Pre-Treatment Position in bed  -AN        Post Treatment Position chair  -AN        In Chair reclined;with nsg;exit alarm on;call light within reach;encouraged to call for assist  -AN           Pain Assessment    Additional Documentation Pain Scale: Numbers Pre/Post-Treatment (Group)  -AN           Pain Scale: Numbers Pre/Post-Treatment    Pain Scale: Numbers, Pretreatment 0/10 - no pain  -AN        Pain Scale: Numbers, Post-Treatment 0/10 - no pain  -AN        Pre/Post Treatment Pain Comment Pt has lacerations Mikey knees, complaint only with RN cleansing  -AN           Wound 03/20/18 2035 Left anterior knee abrasion    Wound - Properties Group Date first assessed: 03/20/18  -AM Time first assessed: 2035  -AM Present On Admission : yes  -AM Side: Left  -AM Orientation: anterior  -AM Location: knee  -AM Type: abrasion  -AM       Wound 03/20/18 2035 Right anterior knee abrasion    Wound - Properties Group Date first assessed: 03/20/18  -AM Time first assessed: 2035  -AM Present On Admission : yes  -AM Side: Right  -AM Orientation: anterior  -AM Location: knee  -AM Type: abrasion  -AM       Wound 03/20/18 2035 medial gluteal pressure injury    Wound - Properties Group Date first assessed: 03/20/18  -AM Time first assessed: 2035  -AM Present On Admission : yes  -AM Orientation: medial  -AM Location: gluteal  -AM Type: pressure injury  -AM Stage, Pressure Injury: Stage 2  -AM       Clinical Impression (OT)    Date of Referral to OT 03/20/18  -AN        OT Diagnosis Decreased ADL I  -AN        Patient/Family Goals Statement (OT Eval) Agreeable to OT  -AN        Criteria for Skilled Therapeutic Interventions Met (OT Eval) yes;treatment indicated  -AN        Rehab Potential (OT Eval) good, to achieve stated therapy goals  -AN        Therapy Frequency (OT Eval) daily  -AN        Care Plan Review (OT) evaluation/treatment results reviewed  -AN        Anticipated Discharge Disposition (OT)  inpatient rehab facility  -AN           Vital Signs    Pre Systolic BP Rehab 142  -AN        Pre Treatment Diastolic BP 71  -AN        Post Systolic BP Rehab 148  -AN        Post Treatment Diastolic BP 52  -AN        Pretreatment Heart Rate (beats/min) 74  -AN        Posttreatment Heart Rate (beats/min) 85  -AN        Pre SpO2 (%) 95  -AN        O2 Delivery Pre Treatment room air  -AN        Post SpO2 (%) 94  -AN        O2 Delivery Post Treatment room air  -AN           Planned OT Interventions    Planned Therapy Interventions (OT Eval) activity tolerance training;functional balance retraining;occupation/activity based interventions;strengthening exercise;transfer/mobility retraining  -AN           OT Goals    Bed Mobility Goal Selection (OT) bed mobility, OT goal 1  -AN        Transfer Goal Selection (OT) transfer, OT goal 1  -AN        Dressing Goal Selection (OT) dressing, OT goal 1  -AN        Toileting Goal Selection (OT) toileting, OT goal 1  -AN        Self-Feeding Goal Selection (OT) self feeding, OT goal 1  -AN        Additional Documentation Self-Feeding Goal Selection (OT) (Row)  -AN           Bed Mobility Goal 1 (OT)    Activity/Assistive Device (Bed Mobility Goal 1, OT) supine to sit  -AN        [place] Level/Cues Needed (Bed Mobility Goal 1, OT) verbal cues required;minimum assist (75% or more patient effort)  -AN        Time Frame (Bed Mobility Goal 1, OT) 10 days  -AN           Transfer Goal 1 (OT)    Activity/Assistive Device (Transfer Goal 1, OT) toilet  -AN        [place] Level/Cues Needed (Transfer Goal 1, OT) minimum assist (75% or more patient effort);verbal cues required  -AN        Time Frame (Transfer Goal 1, OT) 10 days  -AN           Dressing Goal 1 (OT)    Activity/Assistive Device (Dressing Goal 1, OT) other (see comments)   Don pants  -AN        [place]/Cues Needed (Dressing Goal 1, OT) minimum assist (75% or more patient effort)  -AN        Time Frame (Dressing Goal 1,  OT) 10 days  -AN           Toileting Goal 1 (OT)    Activity/Device (Toileting Goal 1, OT) commode, bedside without drop arms;raised toilet seat  -AN        [place] Level/Cues Needed (Toileting Goal 1, OT) minimum assist (75% or more patient effort)  -AN        Time Frame (Toileting Goal 1, OT) 10 days  -AN           Self-Feeding Goal 1 (OT)    Activity/Assistive Device (Self-Feeding Goal 1, OT) adapted cup;built-up handle utensils;self-feeding skills, all  -AN        [place] Level/Cues Needed (Self-Feeding Goal 1, OT) verbal cues required;tactile cues required  -AN        Time Frame (Self-Feeding Goal 1, OT) 10 days  -AN           Living Environment    Home Accessibility tub/shower is not walk in;stairs to enter home   has TTB  -AN          User Key  (r) = Recorded By, (t) = Taken By, (c) = Cosigned By    Initials Name Effective Dates    AN Gisele Perry OT 06/22/15 -     AM Rashida Baker RN 10/16/17 -            Occupational Therapy Education     Title: PT OT SLP Therapies (Active)     Topic: Occupational Therapy (Active)     Point: ADL training (Done)     Description: Instruct learner(s) on proper safety adaptation and remediation techniques during self care or transfers.   Instruct in proper use of assistive devices.   Learning Progress Summary     Learner Status Readiness Method Response Comment Documented by    Patient Done Acceptance MARIANGEL KOVACSNR Educated on OT role and discussed POC. AN 03/21/18 1038                      User Key     Initials Effective Dates Name Provider Type Discipline    AN 06/22/15 -  Gisele Perry OT Occupational Therapist OT                  OT Recommendation and Plan  Rehab Outcome Summary/Treatment Plan  Anticipated Discharge Disposition (OT): inpatient rehab facility  Planned Therapy Interventions (OT Eval): activity tolerance training, functional balance retraining, occupation/activity based interventions, strengthening exercise, transfer/mobility retraining  Therapy  Frequency (OT Eval): daily  Plan of Care Review  Plan of Care Reviewed With: patient  Plan of Care Reviewed With: patient  Outcome Summary: Pt current evolving symptoms along with PMH and progressive dx have decreased ADL and mobility I. Pt currently mod and max x 2 for txfrs and mobility. Pt is max assist for ADL's.  Pt will need IRF at discharge as pt was I with ADL's.           Outcome Measures     Row Name 03/21/18 0996             How much help from another is currently needed...    Putting on and taking off regular lower body clothing? 1  -AN      Bathing (including washing, rinsing, and drying) 1  -AN      Toileting (which includes using toilet bed pan or urinal) 1  -AN      Putting on and taking off regular upper body clothing 2  -AN      Taking care of personal grooming (such as brushing teeth) 2  -AN      Eating meals 2  -AN      Score 9  -AN         Functional Assessment    Outcome Measure Options AM-PAC 6 Clicks Daily Activity (OT)  -AN        User Key  (r) = Recorded By, (t) = Taken By, (c) = Cosigned By    Initials Name Provider Type    AN Gisele Perry OT Occupational Therapist          Time Calculation:   OT Start Time: 0925    Therapy Charges for Today     Code Description Service Date Service Provider Modifiers Qty    [id number]  OT EVAL MOD COMPLEXITY 4 3/21/2018 Gisele Perry OT GO 1    [id number]  OT THERAPEUTIC ACT EA 15 MIN 3/21/2018 Gisele Perry OT GO 1    [id number]  OT THER SUPP EA 15 MIN 3/21/2018 Gisele Perry OT GO 1               Gisele Perry OT  3/21/2018

## 2018-03-21 NOTE — PLAN OF CARE
Problem: Skin Injury Risk (Adult)  Goal: Skin Health and Integrity  Outcome: Ongoing (interventions implemented as appropriate)   03/21/18 0945   Skin Injury Risk (Adult)   Skin Health and Integrity making progress toward outcome       Problem: Patient Care Overview  Goal: Plan of Care Review  Outcome: Ongoing (interventions implemented as appropriate)   03/21/18 0945   Coping/Psychosocial   Plan of Care Reviewed With patient   OTHER   Outcome Summary WOC nurse consulted to assess sacral wound. Pt has blanchable gluteal cleft skin tear; cleaned with blue skin wipes, Z-guard applied; CHERELLE with dry dominga pads. Pt has bilateral knee abrasions secondary to recent fall at home. Continue Xeroform and foam non-adherent dressing change daily. See LDA's and orders. Continue waffle mattress and waffle cushion when sitting. WOC nurse will f/u. Please contact WOC nurse as needed for concerns.    Plan of Care Review   Progress no change

## 2018-03-21 NOTE — PROGRESS NOTES
[facility]  PROGRESS NOTE    Patient Name: Dakota Brizuela  : 1930  MRN: [id number]    Date of Admission: 3/20/2018  Length of Stay: 1  Primary Care Physician: Sam Hernandez MD    Subjective   Subjective     CC:  FU Fall    HPI:  Pt reports feeling well. Some penile pain after a few I&O cath since admission. No other complaints. Denies CP. Daughter at bedside.     Review of Systems  Gen- No fevers, chills  CV- No chest pain, palpitations  Resp- No cough, dyspnea  GI- No N/V/D, abd pain    Otherwise ROS is negative except as mentioned in the HPI.    Objective   Objective     Vital Signs:   Temp:  [98.1 °F (36.7 °C)-98.6 °F (37 °C)] 98.3 °F (36.8 °C)  Heart Rate:  [] 80  Resp:  [18-20] 20  BP: (108-162)/(63-88) 145/67        Physical Exam:  Constitutional: No acute distress, awake, alert on RA in chair  Eyes: PERRLA, sclerae anicteric, no conjunctival injection  HENT: NCAT, mucous membranes moist  Neck: Supple, no thyromegaly, no lymphadenopathy, trachea midline  Respiratory: Clear to auscultation bilaterally, nonlabored respirations   Cardiovascular: RRR, no murmurs, rubs, or gallops, palpable pedal pulses bilaterally  Gastrointestinal: Positive bowel sounds, soft, nontender, nondistended  Musculoskeletal: No bilateral ankle edema, no clubbing or cyanosis to extremities  Psychiatric: Appropriate affect, cooperative  Neurologic: Oriented x 3, strength symmetric in all extremities, Cranial Nerves grossly intact to confrontation, speech clear  Skin: No rashes    Results Reviewed:  I have personally reviewed current lab, radiology, and data and agree.      Results from last 7 days  Lab Units 18  0814 18  1633   WBC 10*3/mm3 9.66 13.82*   HEMOGLOBIN g/dL 10.7* 12.2*   HEMATOCRIT % 33.0* 37.3*   PLATELETS 10*3/mm3 255 291       Results from last 7 days  Lab Units 18  0814 18  0059 185 18  1633   SODIUM mmol/L 144   --   --  142   POTASSIUM mmol/L 3.4*  --   --  3.7   CHLORIDE mmol/L 115*  --   --  107   CO2 mmol/L 24.0  --   --  24.0   BUN mg/dL 24*  --   --  31*   CREATININE mg/dL 1.20  --   --  1.30   GLUCOSE mg/dL 97  --   --  135*   CALCIUM mg/dL 8.7  --   --  9.5   ALT (SGPT) U/L  --   --   --  41*   AST (SGOT) U/L  --   --   --  88*   TROPONIN I ng/mL  --  0.086* 0.078* 0.051*     No results found for: BNP  No results found for: PHART    Microbiology Results Abnormal     Procedure Component Value - Date/Time    Blood Culture - Blood, [[id number]]  (Normal) Collected:  03/20/18 1640    Lab Status:  Preliminary result Specimen:  Blood from Wrist, Right Updated:  03/21/18 0516     Blood Culture No growth at less than 24 hours    Blood Culture - Blood, [[id number]]  (Normal) Collected:  03/20/18 1635    Lab Status:  Preliminary result Specimen:  Blood from Arm, Right Updated:  03/21/18 0516     Blood Culture No growth at less than 24 hours    Influenza A & B, RT PCR - Swab, Nasopharynx [[id number]]  (Normal) Collected:  03/20/18 1719    Lab Status:  Final result Specimen:  Swab from Nasopharynx Updated:  03/20/18 1826     Influenza A PCR Not Detected     Influenza B PCR Not Detected          Imaging Results (last 24 hours)     Procedure Component Value Units Date/Time    FL Video Swallow With Speech [[id number]] Collected:  03/21/18 1412     Updated:  03/21/18 1412    Narrative:       EXAMINATION: FL VIDEO SWALLOW W SPEECH-     INDICATION: dysphagia; T79.6XXA-Traumatic ischemia of muscle, initial  encounter; R26.2-Difficulty in walking, not elsewhere classified;  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 1  associated image was saved. The patient was evaluated in the seated  lateral position while taking a variety of consistencies of barium by  mouth under the direction of speech pathology.      COMPARISON: NONE     FINDINGS: There was penetration to the level of the vocal folds with  multiple large sized sips of thin barium from a straw. There was no  penetration and no aspiration with small single sips of thin barium from  a straw, cup, or teaspoon. There was no penetration and no aspiration  with pudding, or solid consistency barium.          Impression:       Fluoroscopy provided for a modified barium swallow. Please  see speech therapy report for full details and recommendations.           CT Head Without Contrast [[id number]] Collected:  03/21/18 0935     Updated:  03/21/18 1305    Narrative:       EXAMINATION: CT HEAD WO CONTRAST- 03/20/2018     INDICATION: T79.6XXA-Traumatic ischemia of muscle, initial encounter;  R26.2-Difficulty in walking, not elsewhere classified; G23.1-Progressive  supranuclear ophthalmoplegia (Steele-Richardson-Olszewski)         TECHNIQUE: CT data set of the brain was performed without intravenous  contrast.     The radiation dose reduction device was turned on for each scan per the  ALARA (As Low as Reasonably Achievable) protocol.     COMPARISON: Compared to distant MR datasets of the brain 12/15/2015.     FINDINGS:   1. The foramen magnum is clear. The posterior fossa is unremarkable.  2. There is global atrophy in the brain both centrally and peripherally.  Cortical gray matter volume loss is noted over the convexities.  3. Otherwise, there is no evidence of intra-axial cerebral contusion or  hemorrhage. There is no extracerebral collection or midline shift. Mass  effect or edema is not identified.  4. Extended window datasets demonstrate paranasal sinuses to be clear.  Mastoids are clear. Skull base is intact. C1-C2 is well aligned. There  is no evidence of calvarial fracture.       Impression:       Negative CT data set of the brain and skull. There is no  evidence of acute intra-axial or intracranial process. There is no  cerebral contusion, edema, mass or  extracerebral collection. Midline  shift is not identified.     D:  03/21/2018  E:  03/21/2018        This report was finalized on 3/21/2018 1:03 PM by Dr. Tk Cleveland MD.       XR Knee 1 or 2 View Bilateral [[id number]] Collected:  03/20/18 1736     Updated:  03/21/18 0919    Narrative:       EXAMINATION: XR KNEE 1 OR 2 VW, BILATERAL-03/20/2018:      INDICATION: Fall onto knees.      COMPARISON: 09/21/2017.     FINDINGS: Bipartite patella with smooth cortical margins noted  posterolateral segment unchanged from 09/21/2017. No acute fracture or  osseous malalignment is observed with tricompartmental degenerative  disease including osteophytosis and joint space narrowing greatest in  the patellofemoral and medial femorotibial compartments.    Small-to-moderate suprapatellar joint effusion.        Impression:       No acute osseous abnormality with extensive background  degenerative changes and joint space narrowing in the medial  femorotibial compartment along with redemonstration of bipartite  patella. Small-to-moderate suprapatellar joint effusion.     D:  03/20/2018  E:  03/21/2018             XR Pelvis 1 or 2 View [[id number]] Collected:  03/20/18 1734     Updated:  03/21/18 0918    Narrative:       EXAMINATION: XR PELVIS 1 OR 2 VW-03/20/2018:      INDICATION: Fall from standing.      COMPARISON: NONE.     FINDINGS: No displaced pelvic ring fracture with SI joints and pubic  symphysis well approximated. Femoral heads are well seated within the  acetabular cups. No discrete fracture within the proximal femurs.  Background degenerative changes of the SI joints and bilateral hip  joints.       Impression:       No acute osseous abnormality specifically, no displaced  pelvic ring fracture. Background extensive degenerative changes.     D:  03/20/2018  E:  03/21/2018             XR Chest 1 View [[id number]] Collected:  03/20/18 1936     Updated:  03/20/18 2051    Narrative:       EXAM:    XR Chest, 1 View    CLINICAL  HISTORY:    87 years old, male; Progressive supranuclear ophthalmoplegia   steele-richardson-olszewski; Difficulty in walking, not elsewhere classified;   Traumatic ischemia of muscle, initial encounter; Signs and symptoms; Fever    TECHNIQUE:    Frontal view of the chest.    COMPARISON:    No relevant prior studies available.    FINDINGS:    Prominent lung markings/peribronchial thickening with bibasal atelectasis and   opacities in the lung bases bilaterally adjacent to the RIGHT cardiophrenic   angle.  Remainder the lungs are unremarkable. No pleural effusion or   pneumothorax.  Heart size is normal, cental pulmonary vascular congestion.    Unfolding of the aortic arch with a tortuous descending thoracic aorta.       Impression:         Chronic cardiopulmonary changes with findings suspicious for bibasal   aspiration pneumonia.  Recommend comparison with prior studies and followup to   complete resolution.       THIS DOCUMENT HAS BEEN ELECTRONICALLY SIGNED BY ATUL BUTLER MD             I have reviewed the medications.    Assessment/Plan   Assessment / Plan     Hospital Problem List     * (Principal)Traumatic rhabdomyolysis    Hypertension    GERD (gastroesophageal reflux disease)    Hyperlipidemia    PSP (progressive supranuclear palsy)    Hyperglycemia    Leukocytosis    Low TSH level    Normocytic anemia    Acute metabolic encephalopathy    Elevated troponin           Brief Hospital Course to date:  Dakota Brizuela is a 87 y.o. male with a past medical history significant for hypertension, HLD, hypothyroidism, and PSP who presents s/p fall early this morning around 0330. Down for ~12hrs and confused.     Assessment & Plan:    - Acute metabolic encephalopathy: Improving. From rhabdo  - Traumatic rhabdo: IVF increased to 200cc/hr. Monitor calcium, CK, myoglobin  - KRYSTA: Baseline Cr likely normal. Likely 2/2 rhabdo  - Leukocytosis: Zosyn was started for suspect aspiration PNA. Will DC. Speech eval neg for  aspiration and lungs CTAB with normal WBC today. Likely reactive  - Urinary retention: Start tamsulosin and finasteride  - HTN: Home atenolol, amlodipine    DVT Prophylaxis:  SQH    CODE STATUS: Conditional Code - change of code status today    Disposition: I expect the patient to be discharged home in 2-3 days    Tricia Fatima MD  03/21/18  4:41 PM

## 2018-03-21 NOTE — THERAPY EVALUATION
Acute Care - Physical Therapy Initial Evaluation  [facility]     Patient Name: Dakota Brizuela  : 1930  MRN: [id number]  Today's Date: 3/21/2018   Onset of Illness/Injury or Date of Surgery: 18  Date of Referral to PT: 18  Referring Physician: ANDREI Montemayor      Admit Date: 3/20/2018    Visit Dx:     ICD-10-CM ICD-9-CM   1. Traumatic rhabdomyolysis, initial encounter T79.6XXA 958.6   2. Inability to walk R26.2 719.7   3. Progressive supranuclear palsy G23.1 333.0   4. Impaired mobility and ADLs Z74.09 799.89   5. Pharyngeal dysphagia R13.13 787.23   6. Impaired functional mobility, balance, gait, and endurance Z74.09 V49.89     Patient Active Problem List   Diagnosis   • Primary osteoarthritis of right knee   • Hypertension   • GERD (gastroesophageal reflux disease)   • Hyperlipidemia   • PSP (progressive supranuclear palsy)   • Traumatic rhabdomyolysis   • Hyperglycemia   • Leukocytosis   • Low TSH level   • Normocytic anemia   • Acute metabolic encephalopathy   • Elevated troponin     Past Medical History:   Diagnosis Date   • Hypertension    • Thyroid disease      Past Surgical History:   Procedure Laterality Date   • APPENDECTOMY     • PROSTATE SURGERY          PT ASSESSMENT (last 72 hours)      Physical Therapy Evaluation     Row Name 18 1635          PT Evaluation Time/Intention    Subjective Information complains of;pain  -LR     Document Type evaluation  -LR     Mode of Treatment physical therapy;individual therapy  -LR     Patient Effort excellent  -LR     Symptoms Noted During/After Treatment fatigue  -LR     Row Name 18 1638          General Information    Patient Profile Reviewed? yes  -LR     Onset of Illness/Injury or Date of Surgery 18  -LR     Referring Physician ANDREI Montemayor  -LR     Patient Observations alert;cooperative;agree to therapy  -LR     Patient/Family Observations Daughter present on arrival but left at initiation of eval.   -LR     General Observations of  Patient Patient sitting reclined [facility] on arrival. IV, Tele. [employer] to bilateral knees.   -LR     Prior Level of Function independent:;ADL's;all household mobility;gait;transfer;bed mobility;dependent:;community mobility;cleaning;shopping   used RW for mobility about the home  -LR     Equipment Currently Used at Home bath bench;commode;rollator   rollator at all times at home.   -LR     Pertinent History of Current Functional Problem Patient had unwitnessed fall at home, daughter estimates patient had been down for about 12 hours. Patient with confusion, daughter reports he did have some hallucinations. Patient found to be septic and have rhabdomyolysis. Patient has progressive supranuclear palsy.   -LR     Existing Precautions/Restrictions fall;other (see comments)   progressive supranuclear palsy  -LR     Limitations/Impairments visual  -LR     Equipment Issued to Patient --   none  -LR     Equipment Ordered for Patient --   none  -LR     Risks Reviewed patient:;LOB;dizziness;increased discomfort;lines disloged  -LR     Benefits Reviewed patient:;improve function;increase independence;increase strength;increase balance;decrease pain;increase knowledge  -LR     Barriers to Rehab medically complex;previous functional deficit  -LR     Row Name 03/21/18 1635          Relationship/Environment    Primary Source of Support/Comfort child(kojo)   live nearby and check on patient daily  -LR     Lives With alone  -LR     Row Name 03/21/18 1635          Resource/Environmental Concerns    Current Living Arrangements home/apartment/condo  -LR     Resource/Environmental Concerns none  -LR     Row Name 03/21/18 1635          Home Main Entrance    Number of Stairs, Main Entrance one  -LR     Stair Railings, Main Entrance none  -LR     Row Name 03/21/18 1635          Cognitive Assessment/Intervention- PT/OT    Orientation Status (Cognition) oriented x 4;other (see comments)   required increased time to answer  -LR     Follows  Commands (Cognition) follows one step commands;50-74% accuracy;delayed response/completion;increased processing time needed;physical/tactile prompts required;repetition of directions required;verbal cues/prompting required  -LR     Safety Deficit (Cognitive) moderate deficit;at risk behavior observed;insight into deficits/self awareness  -LR     Row Name 03/21/18 1635          Safety Issues, Functional Mobility    Safety Issues Affecting Function (Mobility) at risk behavior observed;awareness of need for assistance;positioning of assistive device;safety precaution awareness  -LR     Impairments Affecting Function (Mobility) balance;cognition;coordination;motor control;strength  -LR     Row Name 03/21/18 1635          Bed Mobility Assessment/Treatment    Supine-Sit [place] (Bed Mobility) not tested   [facility] on arrival.   -LR     Row Name 03/21/18 1635          Transfer Assessment/Treatment    Transfer Assessment/Treatment sit-stand transfer;stand-sit transfer  -LR     Comment (Transfers) Verbal and tactile cues and assist to push up from chair to stand. Patient leaning to the right and posteriorly upon standing, requiring significant assist. Corrected with cues. Verbal cues to reach back for chair to lower into sitting.   -LR     Sit-Stand [place] (Transfers) verbal cues;minimum assist (75% patient effort);2 person assist  -LR     Stand-Sit [place] (Transfers) verbal cues;minimum assist (75% patient effort);1 person to manage equipment  -LR     Row Name 03/21/18 1635          Sit-Stand Transfer    Assistive Device (Sit-Stand Transfers) walker, front-wheeled  -LR     Row Name 03/21/18 1635          Stand-Sit Transfer    Assistive Device (Stand-Sit Transfers) walker, front-wheeled  -LR     Row Name 03/21/18 1635          Gait/Stairs Assessment/Training    [place] Level (Gait) verbal cues;minimum assist (75% patient effort);1 person to manage equipment  -LR     Assistive Device (Gait) walker,  front-wheeled  -LR     Distance in Feet (Gait) 100  -LR     Pattern (Gait) step-through  -LR     Deviations/Abnormal Patterns (Gait) bilateral deviations;festinating/shuffling;[place] decreased;gait speed decreased;stride length decreased  -LR     Bilateral Gait Deviations heel strike decreased  -LR     Comment (Gait/Stairs) Patient initiated with short shuffling steps, advancing RW too far out in front. Improved with cues for correction and once patient got out into open space in hallway. Patient progressed to step through gait pattern with improved foot clearance. Gait limited by fatigue. Tech brought chair up behind patient to sit once too fatigued to progress.   -LR     Row Name 03/21/18 1635          General ROM    RT Lower Ext Comment  -LR     LT Lower Ext Comment  -LR     Row Name 03/21/18 1635          Right Lower Ext    RT Lower Extremity Comments R LE AROM WFL  -LR     Row Name 03/21/18 1635          Left Lower Ext    LT Lower Extremity Comments L LE AROM WFL  -LR     Row Name 03/21/18 1635          General Assessment (Manual Muscle Testing)    General Manual Muscle Testing (MMT) Assessment lower extremity strength deficits identified  -LR     Row Name 03/21/18 1635          Lower Extremity (Manual Muscle Testing)    Comment, MMT: Lower Extremity B LEs functionally 4-/5  -LR     Row Name 03/21/18 1635          Sensory Assessment/Intervention    Sensory General Assessment --   denies numbness/tingling;  -LR     Row Name 03/21/18 1635          Vision Assessment/Intervention    Visual Impairment/Limitations other (see comments)   effected by PSP  -LR     Row Name 03/21/18 1635          Pain Assessment    Additional Documentation Pain Scale: FACES Pre/Post-Treatment (Group)  -     Row Name 03/21/18 1635          Pain Scale: FACES Pre/Post-Treatment    Pain: FACES Scale, Pretreatment 2-->hurts little bit  -LR     Pain: FACES Scale, Post-Treatment 2-->hurts little bit  -LR     Row Name             Wound  03/20/18 2035 Left anterior knee abrasion    Wound - Properties Group Date first assessed: 03/20/18  -AM Time first assessed: 2035 -AM Present On Admission : yes  -AM Side: Left  -AM Orientation: anterior  -AM Location: knee  -AM Type: abrasion  -AM    Row Name             Wound 03/20/18 2035 Right anterior knee abrasion    Wound - Properties Group Date first assessed: 03/20/18  -AM Time first assessed: 2035  -AM Present On Admission : yes  -AM Side: Right  -AM Orientation: anterior  -AM Location: knee  -AM Type: abrasion  -AM    Row Name             [REMOVED] Wound 03/20/18 2035 medial gluteal pressure injury    Wound - Properties Group Date first assessed: 03/20/18  -AM Time first assessed: 2035  -AM Present On Admission : yes  -AM Orientation: medial  -AM Location: gluteal  -AM Type: pressure injury  -AM Stage, Pressure Injury: Stage 2  -AM Resolution Date: 03/21/18  -CP Resolution Time: 0945  -CP    Row Name             Wound 03/21/18 0945 other (see comments) medial gluteal skin tear    Wound - Properties Group Date first assessed: 03/21/18  -CP Time first assessed: 0945  -CP Present On Admission : yes  -CP Side: other (see comments)  -CP, medial  Orientation: medial  -CP Location: gluteal  -CP Type: skin tear  -CP    Row Name 03/21/18 1635          Coping    Observed Emotional State accepting;cooperative  -LR     Verbalized Emotional State acceptance  -LR     Row Name 03/21/18 1635          Plan of Care Review    Plan of Care Reviewed With patient;daughter  -LR     Row Name 03/21/18 1635          Physical Therapy Clinical Impression    Date of Referral to PT 03/20/18  -LR     PT Diagnosis (PT Clinical Impression) traumatic rhabdomyolosis/acute metabolic encephalopathy  -LR     Prognosis (PT Clinical Impression) good  -LR     Patient/Family Goals Statement (PT Clinical Impression) walk, increase strength  -LR     Criteria for Skilled Interventions Met (PT Clinical Impression) yes;treatment indicated  -LR      Rehab Potential (PT Clinical Summary) good, to achieve stated therapy goals  -LR     Care Plan Review (PT) evaluation/treatment results reviewed;care plan/treatment goals reviewed;risks/benefits reviewed;current/potential barriers reviewed;patient/other agree to care plan  -LR     Row Name 03/21/18 1635          Physical Therapy Goals    Bed Mobility Goal Selection (PT) bed mobility, PT goal 1  -LR     Transfer Goal Selection (PT) transfer, PT goal 1  -LR     Gait Training Goal Selection (PT) gait training, PT goal 1  -LR     Row Name 03/21/18 1635          Bed Mobility Goal 1 (PT)    Activity/Assistive Device (Bed Mobility Goal 1, PT) sit to supine/supine to sit  -LR     [place] Level/Cues Needed (Bed Mobility Goal 1, PT) conditional independence  -LR     Time Frame (Bed Mobility Goal 1, PT) long term goal (LTG);5 days  -LR     Progress/Outcomes (Bed Mobility Goal 1, PT) goal ongoing  -LR     Row Name 03/21/18 1637          Transfer Goal 1 (PT)    Activity/Assistive Device (Transfer Goal 1, PT) sit-to-stand/stand-to-sit;walker, rolling  -LR     [place] Level/Cues Needed (Transfer Goal 1, PT) conditional independence  -LR     Time Frame (Transfer Goal 1, PT) long term goal (LTG);3 days  -LR     Progress/Outcome (Transfer Goal 1, PT) goal ongoing  -LR     Row Name 03/21/18 1638          Gait Training Goal 1 (PT)    Activity/Assistive Device (Gait Training Goal 1, PT) gait (walking locomotion);walker, rolling  -LR     [place] Level (Gait Training Goal 1, PT) contact guard assist  -LR     Distance (Gait Goal 1, PT) 150 feet  -LR     Time Frame (Gait Training Goal 1, PT) long term goal (LTG);5 days  -LR     Progress/Outcome (Gait Training Goal 1, PT) goal ongoing  -LR     Row Name 03/21/18 1633          Positioning and Restraints    Pre-Treatment Position sitting in chair/recliner  -LR     Post Treatment Position chair  -LR     In Chair notified nsg;reclined;sitting;call light within reach;encouraged to  call for assist;exit alarm on;legs elevated  -LR     Row Name 03/21/18 1631          Living Environment    Home Accessibility tub/shower is not walk in;stairs to enter home  -LR       User Key  (r) = Recorded By, (t) = Taken By, (c) = Cosigned By    Initials Name Provider Type    SHANTE Tyler Nurse Practitioner    LR Nicole Ford, PT Physical Therapist    DYLAN Baker RN Registered Nurse          Physical Therapy Education     Title: PT OT SLP Therapies (Active)     Topic: Physical Therapy (Done)     Point: Mobility training (Done)    Learning Progress Summary     Learner Status Readiness Method Response Comment Documented by    Patient Done Acceptance E,D VU,NR Educated on correct gait mechanics and safety with mobility. LR 03/21/18 1748    Family Done Acceptance E,D VU,NR Educated on correct gait mechanics and safety with mobility. LR 03/21/18 1748          Point: Home exercise program (Done)    Learning Progress Summary     Learner Status Readiness Method Response Comment Documented by    Patient Done Acceptance E,D VU,NR Educated on correct gait mechanics and safety with mobility. LR 03/21/18 1748    Family Done Acceptance E,D VU,NR Educated on correct gait mechanics and safety with mobility. LR 03/21/18 1748          Point: Body mechanics (Done)    Learning Progress Summary     Learner Status Readiness Method Response Comment Documented by    Patient Done Acceptance E,D VU,NR Educated on correct gait mechanics and safety with mobility. LR 03/21/18 1748    Family Done Acceptance E,D VU,NR Educated on correct gait mechanics and safety with mobility. LR 03/21/18 1748          Point: Precautions (Done)    Learning Progress Summary     Learner Status Readiness Method Response Comment Documented by    Patient Done Acceptance E,D VU,NR Educated on correct gait mechanics and safety with mobility. LR 03/21/18 1748    Family Done Acceptance E,D VU,NR Educated on correct gait mechanics  and safety with mobility. LR 03/21/18 1748                      User Key     Initials Effective Dates Name Provider Type Discipline    LR 06/19/15 -  Nicole Ford, PT Physical Therapist PT                PT Recommendation and Plan  Anticipated Discharge Disposition (PT): inpatient rehab facility  Planned Therapy Interventions (PT Eval): balance training, gait training, bed mobility training, home exercise program, patient/family education, strengthening, transfer training  Therapy Frequency (PT Clinical Impression): daily  Outcome Summary/Treatment Plan (PT)  Anticipated Equipment Needs at Discharge (PT):  (none)  Anticipated Discharge Disposition (PT): inpatient rehab facility  Plan of Care Reviewed With: patient, daughter  Progress: improving  Outcome Summary: Patient ambulated 100 feet with RW, requiring min assist d/t instability with mobility. Demonstrates LE weakness and impaired sitting and standing balance. Will progress mobility and balance training, strengthening as able. Recommend IRF upon d/c.           Outcome Measures     Row Name 03/21/18 1635 03/21/18 0925          How much help from another person do you currently need...    Turning from your back to your side while in flat bed without using bedrails? 3  -LR  --     Moving from lying on back to sitting on the side of a flat bed without bedrails? 3  -LR  --     Moving to and from a bed to a chair (including a wheelchair)? 2  -LR  --     Standing up from a chair using your arms (e.g., wheelchair, bedside chair)? 2  -LR  --     Climbing 3-5 steps with a railing? 1  -LR  --     To walk in hospital room? 3  -LR  --     AM-PAC 6 Clicks Score 14  -LR  --        How much help from another is currently needed...    Putting on and taking off regular lower body clothing?  -- 1  -AN     Bathing (including washing, rinsing, and drying)  -- 1  -AN     Toileting (which includes using toilet bed pan or urinal)  -- 1  -AN     Putting on and taking off  regular upper body clothing  -- 2  -AN     Taking care of personal grooming (such as brushing teeth)  -- 2  -AN     Eating meals  -- 2  -AN     Score  -- 9  -AN        Functional Assessment    Outcome Measure Options AM-PAC 6 Clicks Basic Mobility (PT)  -LR AM-PAC 6 Clicks Daily Activity (OT)  -AN       User Key  (r) = Recorded By, (t) = Taken By, (c) = Cosigned By    Initials Name Provider Type    AN Gisele Perry, OT Occupational Therapist    LR Nicole Ford, PT Physical Therapist           Time Calculation:         PT Charges     Row Name 03/21/18 1750             Time Calculation    Start Time 1635  -LR      PT Received On 03/21/18  -LR      PT Goal Re-Cert Due Date 03/31/18  -LR         Time Calculation- PT    Total Timed Code Minutes- PT 10 minute(s)  -LR        User Key  (r) = Recorded By, (t) = Taken By, (c) = Cosigned By    Initials Name Provider Type    LR Nicole Ford, PT Physical Therapist          Therapy Charges for Today     Code Description Service Date Service Provider Modifiers Qty    51086612686 HC GAIT TRAINING EA 15 MIN 3/21/2018 Nicole Ford, PT GP 1    44731408912 HC PT THER SUPP EA 15 MIN 3/21/2018 Nicole Ford, PT GP 2    82582130655 HC PT EVAL MOD COMPLEXITY 3 3/21/2018 Nicole Ford, PT GP 1          PT G-Codes  Outcome Measure Options: AM-PAC 6 Clicks Basic Mobility (PT)      Nicole Ford, PT  3/21/2018

## 2018-03-22 LAB
ALBUMIN SERPL-MCNC: 3.4 G/DL (ref 3.2–4.8)
ANION GAP SERPL CALCULATED.3IONS-SCNC: 10 MMOL/L (ref 3–11)
BUN BLD-MCNC: 16 MG/DL (ref 9–23)
BUN/CREAT SERPL: 16 (ref 7–25)
CALCIUM SPEC-SCNC: 8.2 MG/DL (ref 8.7–10.4)
CHLORIDE SERPL-SCNC: 113 MMOL/L (ref 99–109)
CK SERPL-CCNC: 2515 U/L (ref 26–174)
CO2 SERPL-SCNC: 19 MMOL/L (ref 20–31)
CREAT BLD-MCNC: 1 MG/DL (ref 0.6–1.3)
GFR SERPL CREATININE-BSD FRML MDRD: 71 ML/MIN/1.73
GLUCOSE BLD-MCNC: 93 MG/DL (ref 70–100)
MAGNESIUM SERPL-MCNC: 1.8 MG/DL (ref 1.3–2.7)
MYOGLOBIN SERPL-MCNC: 883 NG/ML (ref 3–110)
PHOSPHATE SERPL-MCNC: 3 MG/DL (ref 2.4–5.1)
POTASSIUM BLD-SCNC: 3.8 MMOL/L (ref 3.5–5.5)
POTASSIUM BLD-SCNC: 4.1 MMOL/L (ref 3.5–5.5)
SODIUM BLD-SCNC: 142 MMOL/L (ref 132–146)

## 2018-03-22 PROCEDURE — 83735 ASSAY OF MAGNESIUM: CPT | Performed by: HOSPITALIST

## 2018-03-22 PROCEDURE — 97116 GAIT TRAINING THERAPY: CPT

## 2018-03-22 PROCEDURE — 82550 ASSAY OF CK (CPK): CPT | Performed by: HOSPITALIST

## 2018-03-22 PROCEDURE — 93010 ELECTROCARDIOGRAM REPORT: CPT | Performed by: INTERNAL MEDICINE

## 2018-03-22 PROCEDURE — 97530 THERAPEUTIC ACTIVITIES: CPT | Performed by: OCCUPATIONAL THERAPIST

## 2018-03-22 PROCEDURE — 80069 RENAL FUNCTION PANEL: CPT | Performed by: HOSPITALIST

## 2018-03-22 PROCEDURE — 99233 SBSQ HOSP IP/OBS HIGH 50: CPT | Performed by: HOSPITALIST

## 2018-03-22 PROCEDURE — 93005 ELECTROCARDIOGRAM TRACING: CPT | Performed by: HOSPITALIST

## 2018-03-22 PROCEDURE — 97110 THERAPEUTIC EXERCISES: CPT

## 2018-03-22 PROCEDURE — 25010000002 HEPARIN (PORCINE) PER 1000 UNITS: Performed by: HOSPITALIST

## 2018-03-22 PROCEDURE — 83874 ASSAY OF MYOGLOBIN: CPT | Performed by: HOSPITALIST

## 2018-03-22 RX ORDER — MAGNESIUM SULFATE HEPTAHYDRATE 40 MG/ML
4 INJECTION, SOLUTION INTRAVENOUS AS NEEDED
Status: DISCONTINUED | OUTPATIENT
Start: 2018-03-22 | End: 2018-03-24 | Stop reason: HOSPADM

## 2018-03-22 RX ORDER — MAGNESIUM SULFATE HEPTAHYDRATE 40 MG/ML
2 INJECTION, SOLUTION INTRAVENOUS AS NEEDED
Status: DISCONTINUED | OUTPATIENT
Start: 2018-03-22 | End: 2018-03-24 | Stop reason: HOSPADM

## 2018-03-22 RX ORDER — POTASSIUM CHLORIDE 1.5 G/1.77G
40 POWDER, FOR SOLUTION ORAL AS NEEDED
Status: DISCONTINUED | OUTPATIENT
Start: 2018-03-22 | End: 2018-03-24 | Stop reason: HOSPADM

## 2018-03-22 RX ORDER — POTASSIUM CHLORIDE 750 MG/1
40 CAPSULE, EXTENDED RELEASE ORAL AS NEEDED
Status: DISCONTINUED | OUTPATIENT
Start: 2018-03-22 | End: 2018-03-24 | Stop reason: HOSPADM

## 2018-03-22 RX ADMIN — HEPARIN SODIUM 5000 UNITS: 5000 INJECTION, SOLUTION INTRAVENOUS; SUBCUTANEOUS at 22:50

## 2018-03-22 RX ADMIN — SODIUM CHLORIDE 100 ML/HR: 9 INJECTION, SOLUTION INTRAVENOUS at 10:42

## 2018-03-22 RX ADMIN — ATORVASTATIN CALCIUM 10 MG: 10 TABLET, FILM COATED ORAL at 10:37

## 2018-03-22 RX ADMIN — ATENOLOL 25 MG: 25 TABLET ORAL at 10:36

## 2018-03-22 RX ADMIN — FINASTERIDE 5 MG: 5 TABLET, FILM COATED ORAL at 10:37

## 2018-03-22 RX ADMIN — HEPARIN SODIUM 5000 UNITS: 5000 INJECTION, SOLUTION INTRAVENOUS; SUBCUTANEOUS at 14:24

## 2018-03-22 RX ADMIN — AMLODIPINE BESYLATE 2.5 MG: 2.5 TABLET ORAL at 10:37

## 2018-03-22 RX ADMIN — SODIUM CHLORIDE 100 ML/HR: 9 INJECTION, SOLUTION INTRAVENOUS at 21:20

## 2018-03-22 RX ADMIN — LEVOTHYROXINE SODIUM 150 MCG: 75 TABLET ORAL at 06:24

## 2018-03-22 RX ADMIN — TAMSULOSIN HYDROCHLORIDE 0.4 MG: 0.4 CAPSULE ORAL at 10:37

## 2018-03-22 RX ADMIN — PANTOPRAZOLE SODIUM 40 MG: 40 TABLET, DELAYED RELEASE ORAL at 06:24

## 2018-03-22 RX ADMIN — HEPARIN SODIUM 5000 UNITS: 5000 INJECTION, SOLUTION INTRAVENOUS; SUBCUTANEOUS at 06:23

## 2018-03-22 NOTE — PLAN OF CARE
Problem: Patient Care Overview  Goal: Plan of Care Review  Outcome: Ongoing (interventions implemented as appropriate)   03/22/18 0986   Coping/Psychosocial   Plan of Care Reviewed With patient   OTHER   Outcome Summary Pt ambulated 380 feet with RW, CGAx2, and chair follow, limited by fatigue. Pt with decreased ability to perform sit-stand t/f this date. Will continue with POC as able. Recommended rehabilitation facility upon d/c.    Plan of Care Review   Progress improving

## 2018-03-22 NOTE — THERAPY EVALUATION
Acute Care - Occupational Therapy Treatment Note  [facility]     Patient Name: Dakota Brizuela  : 1930  MRN: [id number]  Today's Date: 3/22/2018  Onset of Illness/Injury or Date of Surgery: 18  Date of Referral to OT: 18  Referring Physician: ANDREI Montemayor    Admit Date: 3/20/2018       ICD-10-CM ICD-9-CM   1. Traumatic rhabdomyolysis, initial encounter T79.6XXA 958.6   2. Inability to walk R26.2 719.7   3. Progressive supranuclear palsy G23.1 333.0   4. Impaired mobility and ADLs Z74.09 799.89   5. Pharyngeal dysphagia R13.13 787.23   6. Impaired functional mobility, balance, gait, and endurance Z74.09 V49.89     Patient Active Problem List   Diagnosis   • Primary osteoarthritis of right knee   • Hypertension   • GERD (gastroesophageal reflux disease)   • Hyperlipidemia   • PSP (progressive supranuclear palsy)   • Traumatic rhabdomyolysis   • Hyperglycemia   • Leukocytosis   • Low TSH level   • Normocytic anemia   • Acute metabolic encephalopathy   • Elevated troponin     Past Medical History:   Diagnosis Date   • Hypertension    • Thyroid disease      Past Surgical History:   Procedure Laterality Date   • APPENDECTOMY     • PROSTATE SURGERY         Therapy Treatment    Therapy Treatment / Health Promotion    Treatment Time/Intention  Discipline: occupational therapist (18 1031 : ANN-MARIE Holloway)  Document Type: therapy note (daily note) (18 1031 : ANN-MARIE Holloway)  Subjective Information: no complaints, complains of (18 1031 : ANN-MARIE Holloway)  Mode of Treatment: individual therapy, occupational therapy (18 1031 : ANN-MARIE Holloway)  Care Plan Review: care plan/treatment goals reviewed, risks/benefits reviewed, current/potential barriers reviewed, patient/other agree to care plan (18 1031 : ANN-MARIE Holloway)  Therapy Frequency (OT Eval): daily (18 1031 : ANN-MARIE Holloway)  Patient Effort: good (18 1031 : Humaira Peace  BENR)  Existing Precautions/Restrictions: fall (progressive supranucular palsy ) (03/22/18 1031 : ANN-MARIE Holloway)  Plan of Care Review  Plan of Care Reviewed With: patient (03/22/18 1520 : ANN-MARIE Holloway)    Vitals/Pain/Safety  Pain Scale: Numbers Pre/Post-Treatment  Pain Scale: Numbers, Pretreatment: 0/10 - no pain (03/22/18 1031 : ANN-MARIE Holloway)  Pain Scale: Numbers, Post-Treatment: 0/10 - no pain (03/22/18 1031 : ANN-MARIE Holloway)    Mobility,ADL,Motor, Modality  Bed Mobility Assessment/Treatment  Comment (Bed Mobility): UIC (03/22/18 1031 : ANN-MARIE Holloway)  Transfer Assessment/Treatment  Comment (Transfers): completed 2 sit/stand transfers from EOC; min A , cuing for scooting to edge of surface and pushing from UE's for increased independence  (03/22/18 1031 : ANN-MARIE Holloway)  Sit-Stand Transfer  Sit-Stand [place] (Transfers): minimum assist (75% patient effort) (03/22/18 1031 : Humaira Peace OTR)  Stand-Sit Transfer  Stand-Sit [place] (Transfers): minimum assist (75% patient effort) (03/22/18 1031 : Humaira Peace OTR)  Lower Body Dressing Assessment/Training  Lower Body Dressing [place] Level: doff, don, socks (03/22/18 1031 : Humaira Peace OTR)  Lower Body Dressing Position: edge of bed sitting (requiring min A, requiring significant additional time ) (03/22/18 1031 : Humaira Peace OTR)  Therapeutic Exercise  Comment (Therapeutic Exercise): AROM 10 X1, elbow, wrist, shoulder f/e (03/22/18 1031 : Humaira Peace OTR)           ROM/MMT             Sensory, Edema, Orthotics          Cognition, Communication, Swallow  Cognitive Assessment/Intervention- PT/OT  Orientation Status (Cognition): oriented x 4 (03/22/18 1031 : Humaira Peace OTR)  Follows Commands (Cognition): follows two step commands, delayed response/completion (03/22/18 1031 : Humaira Peace, OTR)  Safety Deficit (Cognitive): mild deficit (03/22/18 1031 : Humaira Peace,  OTR)    Outcome Summary           OT Rehab Goals     Row Name 03/21/18 0925             Bed Mobility Goal 1 (OT)    Activity/Assistive Device (Bed Mobility Goal 1, OT) supine to sit  -AN      [place] Level/Cues Needed (Bed Mobility Goal 1, OT) verbal cues required;minimum assist (75% or more patient effort)  -AN      Time Frame (Bed Mobility Goal 1, OT) 10 days  -AN         Transfer Goal 1 (OT)    Activity/Assistive Device (Transfer Goal 1, OT) toilet  -AN      [place] Level/Cues Needed (Transfer Goal 1, OT) minimum assist (75% or more patient effort);verbal cues required  -AN      Time Frame (Transfer Goal 1, OT) 10 days  -AN         Dressing Goal 1 (OT)    Activity/Assistive Device (Dressing Goal 1, OT) other (see comments)   Don pants  -AN      [place]/Cues Needed (Dressing Goal 1, OT) minimum assist (75% or more patient effort)  -AN      Time Frame (Dressing Goal 1, OT) 10 days  -AN         Toileting Goal 1 (OT)    Activity/Device (Toileting Goal 1, OT) commode, bedside without drop arms;raised toilet seat  -AN      [place] Level/Cues Needed (Toileting Goal 1, OT) minimum assist (75% or more patient effort)  -AN      Time Frame (Toileting Goal 1, OT) 10 days  -AN         Self-Feeding Goal 1 (OT)    Activity/Assistive Device (Self-Feeding Goal 1, OT) adapted cup;built-up handle utensils;self-feeding skills, all  -AN      [place] Level/Cues Needed (Self-Feeding Goal 1, OT) verbal cues required;tactile cues required  -AN      Time Frame (Self-Feeding Goal 1, OT) 10 days  -AN        User Key  (r) = Recorded By, (t) = Taken By, (c) = Cosigned By    Initials Name Provider Type    AN Gisele Perry OT Occupational Therapist        Occupational Therapy Education     Title: PT OT SLP Therapies (Active)     Topic: Occupational Therapy (Active)     Point: ADL training (Done)     Description: Instruct learner(s) on proper safety adaptation and remediation techniques during self care or transfers.    Instruct in proper use of assistive devices.   Learning Progress Summary     Learner Status Readiness Method Response Comment Documented by    Patient Done Acceptance E,CLARISSA,MARIANGEL VU,DU HEP, transfers, ADLs ST 03/22/18 1519     Done Acceptance MARIANGEL KOVACS,NR Educated on OT role and discussed POC. AN 03/21/18 1038          Point: Home exercise program (Done)     Description: Instruct learner(s) on appropriate technique for monitoring, assisting and/or progressing therapeutic exercises/activities.   Learning Progress Summary     Learner Status Readiness Method Response Comment Documented by    Patient Done Acceptance E,TB,MARIANGEL VU,DU HEP, transfers, ADLs ST 03/22/18 1519                      User Key     Initials Effective Dates Name Provider Type Discipline    ST 02/20/17 -  Humaira Peace OTR Occupational Therapist OT    AN 06/22/15 -  Gisele Perry OT Occupational Therapist OT                  OT Recommendation and Plan  Therapy Frequency (OT Eval): daily  Plan of Care Review  Plan of Care Reviewed With: patient  Plan of Care Reviewed With: patient  Outcome Summary: Pt with trouble completing LBD d/t decreased ROM and flexibility at trunk, hip and knees; completed several methods in attempts to improve independence. Pt with excellent motivation and good direction following during BUE HEP.         Outcome Measures     Row Name 03/22/18 1410 03/22/18 1031 03/21/18 1635       How much help from another person do you currently need...    Turning from your back to your side while in flat bed without using bedrails? 3  -LR (r) AD (t) LR (c)  -- 3  -LR    Moving from lying on back to sitting on the side of a flat bed without bedrails? 3  -LR (r) AD (t) LR (c)  -- 3  -LR    Moving to and from a bed to a chair (including a wheelchair)? 2  -LR (r) AD (t) LR (c)  -- 2  -LR    Standing up from a chair using your arms (e.g., wheelchair, bedside chair)? 2  -LR (r) AD (t) LR (c)  -- 2  -LR    Climbing 3-5 steps with a railing? 1  -LR (r) AD  (t) LR (c)  -- 1  -LR    To walk in hospital room? 3  -LR (r) AD (t) LR (c)  -- 3  -LR    AM-PAC 6 Clicks Score 14  -LR (r) AD (t)  -- 14  -LR       How much help from another is currently needed...    Putting on and taking off regular lower body clothing?  -- 3  -ST  --    Bathing (including washing, rinsing, and drying)  -- 2  -ST  --    Toileting (which includes using toilet bed pan or urinal)  -- 2  -ST  --    Putting on and taking off regular upper body clothing  -- 3  -ST  --    Taking care of personal grooming (such as brushing teeth)  -- 3  -ST  --    Eating meals  -- 2  -ST  --    Score  -- 15  -ST  --       Functional Assessment    Outcome Measure Options -[facility] 6 Clicks Basic Mobility (PT)  -LR (r) AD (t) LR (c)  -- AM-[facility] 6 Clicks Basic Mobility (PT)  -LR    Row Name 03/21/18 0925             How much help from another is currently needed...    Putting on and taking off regular lower body clothing? 1  -AN      Bathing (including washing, rinsing, and drying) 1  -AN      Toileting (which includes using toilet bed pan or urinal) 1  -AN      Putting on and taking off regular upper body clothing 2  -AN      Taking care of personal grooming (such as brushing teeth) 2  -AN      Eating meals 2  -AN      Score 9  -AN         Functional Assessment    Outcome Measure Options [facility] 6 Clicks Daily Activity (OT)  -AN        User Key  (r) = Recorded By, (t) = Taken By, (c) = Cosigned By    Initials Name Provider Type     Humaira Peace, OTR Occupational Therapist    JOSE Perry, OT Occupational Therapist    LR Nicole Ford, PT Physical Therapist    IVON Nagel, PT Student PT Student           Time Calculation:         Time Calculation- OT     Row Name 03/22/18 1521             Time Calculation- OT    OT Start Time 1031  -ST      Total Timed Code Minutes- OT 26 minute(s)  -ST      OT Received On 03/22/18  -ST      OT Goal Re-Cert Due Date 03/31/18  -ST        User Key  (r) = Recorded By,  (t) = Taken By, (c) = Cosigned By    Initials Name Provider Type     Humaira Peace, OTR Occupational Therapist           Therapy Charges for Today     Code Description Service Date Service Provider Modifiers Qty    [id number]  OT THERAPEUTIC ACT EA 15 MIN 3/22/2018 ANN-MARIE Holloway GO 2               ANN-MARIE Liriano  3/22/2018

## 2018-03-22 NOTE — THERAPY TREATMENT NOTE
Acute Care - Physical Therapy Treatment Note  [facility]     Patient Name: Dakota Brizuela  : 1930  MRN: [id number]  Today's Date: 3/22/2018  Onset of Illness/Injury or Date of Surgery: 18  Date of Referral to PT: 18  Referring Physician: ANDREI Montemayor    Admit Date: 3/20/2018    Visit Dx:    ICD-10-CM ICD-9-CM   1. Traumatic rhabdomyolysis, initial encounter T79.6XXA 958.6   2. Inability to walk R26.2 719.7   3. Progressive supranuclear palsy G23.1 333.0   4. Impaired mobility and ADLs Z74.09 799.89   5. Pharyngeal dysphagia R13.13 787.23   6. Impaired functional mobility, balance, gait, and endurance Z74.09 V49.89     Patient Active Problem List   Diagnosis   • Primary osteoarthritis of right knee   • Hypertension   • GERD (gastroesophageal reflux disease)   • Hyperlipidemia   • PSP (progressive supranuclear palsy)   • Traumatic rhabdomyolysis   • Hyperglycemia   • Leukocytosis   • Low TSH level   • Normocytic anemia   • Acute metabolic encephalopathy   • Elevated troponin       Therapy Treatment    Therapy Treatment / Health Promotion    Treatment Time/Intention  Discipline: physical therapist (18 1410 : Rashida Nagel PT Student)  Document Type: therapy note (daily note) (18 1410 : Rashida Nagel PT Student)  Subjective Information: no complaints (18 1410 : Rashida Nagel PT Student)  Mode of Treatment: individual therapy (18 1410 : Rashida Nagel PT Student)  Patient/Family Observations: Pt awake and reclined in chair. Daughter present in room. Tele and IV (18 1410 : Rasihda Nagel PT Student)  Care Plan Review: patient/other agree to care plan (18 1410 : Rashida Nagel PT Student)  Therapy Frequency (PT Clinical Impression): daily (18 1410 : Rashida Nagel PT Student)  Patient Effort: good (18 1410 : Rashida Nagel PT Student)  Existing Precautions/Restrictions: fall, other (see comments)  (progressive supranuclear palsy) (03/22/18 1410 : Rashida Nagel PT [place])  Plan of Care Review  Plan of Care Reviewed With: patient (03/22/18 1457 : SHANEKA Monzon)    Vitals/Pain/Safety  Pain Scale: Numbers Pre/Post-Treatment  Pain Scale: Numbers, Pretreatment: 0/10 - no pain (03/22/18 1410 : SHANEKA Monzon)  Pain Scale: Numbers, Post-Treatment: 0/10 - no pain (03/22/18 1410 : Rashida Nagel PT Student)  Safety Issues, Functional Mobility  Safety Issues Affecting Function (Mobility): ability to follow commands, at risk behavior observed, sequencing abilities, insight into deficits/self awareness (03/22/18 1410 : SHANEKA Monzon)  Impairments Affecting Function (Mobility): balance, coordination, motor control, strength (03/22/18 1410 : SHANEKA Monzon)  Positioning and Restraints  Pre-Treatment Position: sitting in chair/recliner (03/22/18 1410 : Rashida Nagel PT [place])  Post Treatment Position: chair (03/22/18 1410 : SHANEKA Monzon)  In Chair: notified nsg, reclined, call light within reach, encouraged to call for assist, with family/caregiver, exit alarm on (03/22/18 1410 : SHANEKA Monzon)    Mobility,ADL,Motor, Modality  Bed Mobility Assessment/Treatment  Comment (Bed Mobility): Not tested. Pt UIC (03/22/18 1410 : Rashida Nagel PT Student)  Transfer Assessment/Treatment  Transfer Assessment/Treatment: sit-stand transfer, stand-sit transfer (03/22/18 1410 : Rashida Nagel PT [place])  Comment (Transfers): Required 3 attempts for inital sit-stand t/f. VC for hand placement to push up from chair into standing, bring trunk upright by pulling hips forward, and to push through B LE to maintain erect posture. MaxAx2 and B knee block required for pt to reach standing position.  (03/22/18 1410 : Rashida Nagel PT Student)  Sit-Stand Transfer  Sit-Stand [place] (Transfers):  verbal cues, maximum assist (25% patient effort), 2 person assist (03/22/18 1410 : Rashida Nagel, PT Student)  Assistive Device (Sit-Stand Transfers): walker, front-wheeled (03/22/18 1410 : Rashida Nagel, PT Student)  Stand-Sit Transfer  Stand-Sit [place] (Transfers): verbal cues, contact guard, 2 person assist (03/22/18 1410 : Rashida Nagel, PT Student)  Assistive Device (Stand-Sit Transfers): walker, front-wheeled (03/22/18 1410 : Rashida Nagel, PT Student)  Gait/Stairs Assessment/Training  [place] Level (Gait): verbal cues, contact guard, 2 person assist (03/22/18 1410 : Rashida Nagel, PT Student)  Assistive Device (Gait): walker, front-wheeled (03/22/18 1410 : Rashida Nagel PT Student)  Distance in Feet (Gait): 380 (03/22/18 1410 : Rashida Nagel PT Student)  Pattern (Gait): swing-through (03/22/18 1410 : Rashida Nagel PT Student)  Deviations/Abnormal Patterns (Gait): bilateral deviations, base of support, narrow, [place] decreased, festinating/shuffling, stride length decreased (Forward flexed posture) (03/22/18 1410 : Rashida Nagel PT Student)  Comment (Gait/Stairs): Pt ambulated with RW, chair follow, and CGAx2. Pt initially with short shuffling steps in room, but mechanics improved once in hallway. Once in hallway, pt with increased step length and heel-toe pattern with improved foot clearance. VC for upright posture and forward gaze. Gait limited by fatigue.   (03/22/18 1410 : Rashida Nagel PT Student)     Motor Skills Assessment/Interventions  Additional Documentation: Therapeutic Exercise (Group) (03/22/18 1410 : Rashida Nagel PT Student)  Therapeutic Exercise  Lower Extremity (Therapeutic Exercise): gluteal sets, quad sets, bilateral, SLR (straight leg raise), bilateral, SAQ (short arc quad), left, heel slides, left, marching while seated (B ankle pumps and hip add/abd. ) (03/22/18 1410 : Rashida Nagel, PT  Student)  Position (Therapeutic Exercise): seated (03/22/18 1410 : Rashida Nagel PT Student)  Sets/Reps (Therapeutic Exercise): x15 (03/22/18 1410 : Rashida Nagel PT Student)  Comment (Therapeutic Exercise): R SLR and heel slides not completed d/t R knee pain (03/22/18 1410 : Rashida Nagel PT Student)           ROM/MMT             Sensory, Edema, Orthotics          Cognition, Communication, Swallow  Cognitive Assessment/Intervention- PT/OT  Orientation Status (Cognition): oriented x 4, verbal cues/prompts needed for orientation (03/22/18 1410 : Rashida Nagel, PT Student)  Follows Commands (Cognition): follows one step commands, 50-74% accuracy, delayed response/completion, initiation impaired, physical/tactile prompts required, repetition of directions required, verbal cues/prompting required (03/22/18 1410 : Rashida Nagel PT Student)  Safety Deficit (Cognitive): moderate deficit, insight into deficits/self awareness (03/22/18 1410 : Rashida Nagel, PT Student)    Outcome Summary  Outcome Summary/Treatment Plan (PT)  Daily Summary of Progress (PT): progress toward functional goals is good (03/22/18 1410 : Rashida Nagel, PT Student)            PT Rehab Goals     Row Name 03/21/18 1635             Bed Mobility Goal 1 (PT)    Activity/Assistive Device (Bed Mobility Goal 1, PT) sit to supine/supine to sit  -LR      [place] Level/Cues Needed (Bed Mobility Goal 1, PT) conditional independence  -LR      Time Frame (Bed Mobility Goal 1, PT) long term goal (LTG);5 days  -LR      Progress/Outcomes (Bed Mobility Goal 1, PT) goal ongoing  -LR         Transfer Goal 1 (PT)    Activity/Assistive Device (Transfer Goal 1, PT) sit-to-stand/stand-to-sit;walker, rolling  -LR      [place] Level/Cues Needed (Transfer Goal 1, PT) conditional independence  -LR      Time Frame (Transfer Goal 1, PT) long term goal (LTG);3 days  -LR      Progress/Outcome (Transfer Goal 1, PT) goal ongoing   -LR         Gait Training Goal 1 (PT)    Activity/Assistive Device (Gait Training Goal 1, PT) gait (walking locomotion);walker, rolling  -LR      [place] Level (Gait Training Goal 1, PT) contact guard assist  -LR      Distance (Gait Goal 1, PT) 150 feet  -LR      Time Frame (Gait Training Goal 1, PT) long term goal (LTG);5 days  -LR      Progress/Outcome (Gait Training Goal 1, PT) goal ongoing  -LR        User Key  (r) = Recorded By, (t) = Taken By, (c) = Cosigned By    Initials Name Provider Type    LR Nicole Ford, PT Physical Therapist          Physical Therapy Education     Title: PT OT SLP Therapies (Active)     Topic: Physical Therapy (Done)     Point: Mobility training (Done)    Learning Progress Summary     Learner Status Readiness Method Response Comment Documented by    Patient Done Acceptance E VU Pt educated on proper body mechanics with exercises, transfers and gait training. AD 03/22/18 1410     Done Acceptance E,D VU,NR Educated on correct gait mechanics and safety with mobility. LR 03/21/18 1748    Family Done Acceptance E,D VU,NR Educated on correct gait mechanics and safety with mobility. LR 03/21/18 1748          Point: Home exercise program (Done)    Learning Progress Summary     Learner Status Readiness Method Response Comment Documented by    Patient Done Acceptance E VU Pt educated on proper body mechanics with exercises, transfers and gait training. AD 03/22/18 1410     Done Acceptance E,D VU,NR Educated on correct gait mechanics and safety with mobility. LR 03/21/18 1748    Family Done Acceptance E,D VU,NR Educated on correct gait mechanics and safety with mobility. LR 03/21/18 1748          Point: Body mechanics (Done)    Learning Progress Summary     Learner Status Readiness Method Response Comment Documented by    Patient Done Acceptance E VU Pt educated on proper body mechanics with exercises, transfers and gait training. AD 03/22/18 1410     Done Acceptance E,D VU,NR  Educated on correct gait mechanics and safety with mobility. LR 03/21/18 1748    Family Done Acceptance E,D VU,NR Educated on correct gait mechanics and safety with mobility. LR 03/21/18 1748          Point: Precautions (Done)    Learning Progress Summary     Learner Status Readiness Method Response Comment Documented by    Patient Done Acceptance E VU Pt educated on proper body mechanics with exercises, transfers and gait training. AD 03/22/18 1410     Done Acceptance E,D VU,NR Educated on correct gait mechanics and safety with mobility. LR 03/21/18 1748    Family Done Acceptance E,D VU,NR Educated on correct gait mechanics and safety with mobility. LR 03/21/18 1748                      User Key     Initials Effective Dates Name Provider Type Discipline    LR 06/19/15 -  Nicole Ford, PT Physical Therapist PT    AD 03/07/18 -  Rashida Nagel, PT Student PT Student PT                    PT Recommendation and Plan  Therapy Frequency (PT Clinical Impression): daily  Outcome Summary/Treatment Plan (PT)  Daily Summary of Progress (PT): progress toward functional goals is good  Plan of Care Reviewed With: patient  Progress: improving  Outcome Summary: Pt ambulated 380 feet with RW, CGAx2, and chair follow, limited by fatigue. Pt with decreased ability to perform sit-stand t/f this date. Will continue with POC as able. Recommended rehabilitation facility upon d/c.           Outcome Measures     Row Name 03/22/18 1410 03/21/18 1635 03/21/18 0925       How much help from another person do you currently need...    Turning from your back to your side while in flat bed without using bedrails? 3  -LR (r) AD (t) LR (c) 3  -LR  --    Moving from lying on back to sitting on the side of a flat bed without bedrails? 3  -LR (r) AD (t) LR (c) 3  -LR  --    Moving to and from a bed to a chair (including a wheelchair)? 2  -LR (r) AD (t) LR (c) 2  -LR  --    Standing up from a chair using your arms (e.g., wheelchair,  bedside chair)? 2  -LR (r) AD (t) LR (c) 2  -LR  --    Climbing 3-5 steps with a railing? 1  -LR (r) AD (t) LR (c) 1  -LR  --    To walk in hospital room? 3  -LR (r) AD (t) LR (c) 3  -LR  --    AM-PAC 6 Clicks Score 14  -LR (r) AD (t) 14  -LR  --       How much help from another is currently needed...    Putting on and taking off regular lower body clothing?  --  -- 1  -AN    Bathing (including washing, rinsing, and drying)  --  -- 1  -AN    Toileting (which includes using toilet bed pan or urinal)  --  -- 1  -AN    Putting on and taking off regular upper body clothing  --  -- 2  -AN    Taking care of personal grooming (such as brushing teeth)  --  -- 2  -AN    Eating meals  --  -- 2  -AN    Score  --  -- 9  -AN       Functional Assessment    Outcome Measure Options AM-PAC 6 Clicks Basic Mobility (PT)  -LR (r) AD (t) LR (c) AM-PAC 6 Clicks Basic Mobility (PT)  -LR AM-PAC 6 Clicks Daily Activity (OT)  -AN      User Key  (r) = Recorded By, (t) = Taken By, (c) = Cosigned By    Initials Name Provider Type    AN Gisele Perry, OT Occupational Therapist    LR Nicole Ford, PT Physical Therapist    IVON Nagel, PT Student PT Student           Time Calculation:         PT Charges     Row Name 03/22/18 1500             Time Calculation    Start Time 1410  -LR (r) AD (t) LR (c)      PT Received On 03/22/18  -LR (r) AD (t) LR (c)      PT Goal Re-Cert Due Date 03/31/18  -LR (r) AD (t) LR (c)         Time Calculation- PT    Total Timed Code Minutes- PT 24 minute(s)  -LR (r) AD (t) LR (c)        User Key  (r) = Recorded By, (t) = Taken By, (c) = Cosigned By    Initials Name Provider Type    LR Nicole Ford, PT Physical Therapist    IVON Nagel, PT Student PT Student          Therapy Charges for Today     Code Description Service Date Service Provider Modifiers Qty    91703410406  PT THER PROC EA 15 MIN 3/22/2018 Rashida Nagel, PT Student GP 1    [id number]  GAIT TRAINING EA 15  MIN 3/22/2018 Rashida Nagel, PT Student GP 1    [id number] HC PT THER SUPP EA 15 MIN 3/22/2018 Rashida Nagel, PT Student GP 2          PT G-Codes  Outcome Measure Options: AM-PAC 6 Clicks Basic Mobility (PT)    Rashida Nagel, PT Student  3/22/2018

## 2018-03-22 NOTE — PLAN OF CARE
Problem: Patient Care Overview  Goal: Plan of Care Review  Outcome: Ongoing (interventions implemented as appropriate)   03/22/18 1900   Coping/Psychosocial   Plan of Care Reviewed With patient   OTHER   Outcome Summary Pt with trouble completing LBD d/t decreased ROM and flexibility at trunk, hip and knees; completed several methods in attempts to improve independence. Pt with excellent motivation and good direction following during BUE HEP.

## 2018-03-22 NOTE — PLAN OF CARE
Problem: Fall Risk (Adult)  Goal: Absence of Fall  Outcome: Ongoing (interventions implemented as appropriate)   03/22/18 0801   Fall Risk (Adult)   Absence of Fall making progress toward outcome     Goal: Identify Related Risk Factors and Signs and Symptoms  Outcome: Ongoing (interventions implemented as appropriate)   03/22/18 0801   Fall Risk (Adult)   Related Risk Factors (Fall Risk) age-related changes;bladder function altered;confusion/agitation;gait/mobility problems;history of falls;impaired vision;neuro disease/injury;environment unfamiliar   Signs and Symptoms (Fall Risk) presence of risk factors       Problem: Skin Injury Risk (Adult)  Goal: Skin Health and Integrity  Outcome: Ongoing (interventions implemented as appropriate)   03/22/18 0801   Skin Injury Risk (Adult)   Skin Health and Integrity making progress toward outcome

## 2018-03-22 NOTE — PROGRESS NOTES
[facility] Medicine Services  PROGRESS NOTE    Patient Name: Dakota Brizuela  : 1930  MRN: [id number]    Date of Admission: 3/20/2018  Length of Stay: 2  Primary Care Physician: Sam Hernandez MD    Subjective   Subjective     CC:  FU Fall    HPI:  Pt reports feeling well. No complaints. Noted to have HR 120s while on the commode     Review of Systems  Gen- No fevers, chills  CV- No chest pain, palpitations  Resp- No cough, dyspnea  GI- No N/V/D, abd pain    Otherwise ROS is negative except as mentioned in the HPI.    Objective   Objective     Vital Signs:   Temp:  [98 °F (36.7 °C)-98.7 °F (37.1 °C)] 98 °F (36.7 °C)  Heart Rate:  [71-95] 95  Resp:  [16-20] 18  BP: (142-156)/(67-80) 156/80        Physical Exam:  Constitutional: No acute distress, awake, alert on RA in chair  Eyes: PERRLA, sclerae anicteric, no conjunctival injection  HENT: NCAT, mucous membranes moist  Neck: Supple, no thyromegaly, no lymphadenopathy, trachea midline  Respiratory: Clear to auscultation bilaterally, nonlabored respirations   Cardiovascular: RRR, no murmurs, rubs, or gallops, palpable pedal pulses bilaterally  Gastrointestinal: Positive bowel sounds, soft, nontender to palpation, nondistended  Musculoskeletal: No bilateral ankle edema, no clubbing or cyanosis to extremities  Psychiatric: Appropriate affect, cooperative  Neurologic: Oriented x 3, strength symmetric in all extremities, Cranial Nerves grossly intact to confrontation, speech clear  Skin: No rashes    Results Reviewed:  I have personally reviewed current lab, radiology, and data and agree.      Results from last 7 days  Lab Units 18  0814 18  1633   WBC 10*3/mm3 9.66 13.82*   HEMOGLOBIN g/dL 10.7* 12.2*   HEMATOCRIT % 33.0* 37.3*   PLATELETS 10*3/mm3 255 291       Results from last 7 days  Lab Units 18  0529 18  2156 18  1644 18  0814 18  0059 18  2055 18  1633   SODIUM mmol/L  142  --   --  144  --   --  142   POTASSIUM mmol/L 3.8 4.1 3.6 3.4*  --   --  3.7   CHLORIDE mmol/L 113*  --   --  115*  --   --  107   CO2 mmol/L 19.0*  --   --  24.0  --   --  24.0   BUN mg/dL 16  --   --  24*  --   --  31*   CREATININE mg/dL 1.00  --   --  1.20  --   --  1.30   GLUCOSE mg/dL 93  --   --  97  --   --  135*   CALCIUM mg/dL 8.2*  --   --  8.7  --   --  9.5   ALT (SGPT) U/L  --   --   --   --   --   --  41*   AST (SGOT) U/L  --   --   --   --   --   --  88*   TROPONIN I ng/mL  --   --   --   --  0.086* 0.078* 0.051*     No results found for: BNP  No results found for: PHART    Microbiology Results Abnormal     Procedure Component Value - Date/Time    Blood Culture - Blood, [[id number]]  (Normal) Collected:  03/20/18 1640    Lab Status:  Preliminary result Specimen:  Blood from Wrist, Right Updated:  03/21/18 1716     Blood Culture No growth at 24 hours    Blood Culture - Blood, [[id number]]  (Normal) Collected:  03/20/18 1635    Lab Status:  Preliminary result Specimen:  Blood from Arm, Right Updated:  03/21/18 1716     Blood Culture No growth at 24 hours    Influenza A & B, RT PCR - Swab, Nasopharynx [[id number]]  (Normal) Collected:  03/20/18 1719    Lab Status:  Final result Specimen:  Swab from Nasopharynx Updated:  03/20/18 1826     Influenza A PCR Not Detected     Influenza B PCR Not Detected          Imaging Results (last 24 hours)     Procedure Component Value Units Date/Time    FL Video Swallow With Speech [[id number]] Collected:  03/21/18 1412     Updated:  03/21/18 1653    Narrative:       EXAMINATION: FL VIDEO SWALLOW W SPEECH-     INDICATION: dysphagia; T79.6XXA-Traumatic ischemia of muscle, initial  encounter; R26.2-Difficulty in walking, not elsewhere classified;  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 1  associated image was saved. The patient was  evaluated in the seated  lateral position while taking a variety of consistencies of barium by  mouth under the direction of speech pathology.     COMPARISON: NONE     FINDINGS: There was penetration to the level of the vocal folds with  multiple large sized sips of thin barium from a straw. There was no  penetration and no aspiration with small single sips of thin barium from  a straw, cup, or teaspoon. There was no penetration and no aspiration  with pudding, or solid consistency barium.          Impression:       Fluoroscopy provided for a modified barium swallow. Please  see speech therapy report for full details and recommendations.         This report was finalized on 3/21/2018 4:51 PM by Dr. Tk Cleveland MD.       CT Head Without Contrast [[id number]] Collected:  03/21/18 0935     Updated:  03/21/18 1305    Narrative:       EXAMINATION: CT HEAD WO CONTRAST- 03/20/2018     INDICATION: T79.6XXA-Traumatic ischemia of muscle, initial encounter;  R26.2-Difficulty in walking, not elsewhere classified; G23.1-Progressive  supranuclear ophthalmoplegia (Steele-Richardson-Olszewski)         TECHNIQUE: CT data set of the brain was performed without intravenous  contrast.     The radiation dose reduction device was turned on for each scan per the  ALARA (As Low as Reasonably Achievable) protocol.     COMPARISON: Compared to distant MR datasets of the brain 12/15/2015.     FINDINGS:   1. The foramen magnum is clear. The posterior fossa is unremarkable.  2. There is global atrophy in the brain both centrally and peripherally.  Cortical gray matter volume loss is noted over the convexities.  3. Otherwise, there is no evidence of intra-axial cerebral contusion or  hemorrhage. There is no extracerebral collection or midline shift. Mass  effect or edema is not identified.  4. Extended window datasets demonstrate paranasal sinuses to be clear.  Mastoids are clear. Skull base is intact. C1-C2 is well aligned. There  is no  evidence of calvarial fracture.       Impression:       Negative CT data set of the brain and skull. There is no  evidence of acute intra-axial or intracranial process. There is no  cerebral contusion, edema, mass or extracerebral collection. Midline  shift is not identified.     D:  03/21/2018  E:  03/21/2018        This report was finalized on 3/21/2018 1:03 PM by Dr. Tk Cleveland MD.       XR Knee 1 or 2 View Bilateral [[id number]] Collected:  03/20/18 1736     Updated:  03/21/18 0919    Narrative:       EXAMINATION: XR KNEE 1 OR 2 VW, BILATERAL-03/20/2018:      INDICATION: Fall onto knees.      COMPARISON: 09/21/2017.     FINDINGS: Bipartite patella with smooth cortical margins noted  posterolateral segment unchanged from 09/21/2017. No acute fracture or  osseous malalignment is observed with tricompartmental degenerative  disease including osteophytosis and joint space narrowing greatest in  the patellofemoral and medial femorotibial compartments.    Small-to-moderate suprapatellar joint effusion.        Impression:       No acute osseous abnormality with extensive background  degenerative changes and joint space narrowing in the medial  femorotibial compartment along with redemonstration of bipartite  patella. Small-to-moderate suprapatellar joint effusion.     D:  03/20/2018  E:  03/21/2018             XR Pelvis 1 or 2 View [[id number]] Collected:  03/20/18 1734     Updated:  03/21/18 0918    Narrative:       EXAMINATION: XR PELVIS 1 OR 2 VW-03/20/2018:      INDICATION: Fall from standing.      COMPARISON: NONE.     FINDINGS: No displaced pelvic ring fracture with SI joints and pubic  symphysis well approximated. Femoral heads are well seated within the  acetabular cups. No discrete fracture within the proximal femurs.  Background degenerative changes of the SI joints and bilateral hip  joints.       Impression:       No acute osseous abnormality specifically, no displaced  pelvic ring fracture. Background  extensive degenerative changes.     D:  03/20/2018  E:  03/21/2018                      I have reviewed the medications.    Assessment/Plan   Assessment / Plan     Hospital Problem List     * (Principal)Traumatic rhabdomyolysis    Hypertension    GERD (gastroesophageal reflux disease)    Hyperlipidemia    PSP (progressive supranuclear palsy)    Hyperglycemia    Leukocytosis    Low TSH level    Normocytic anemia    Acute metabolic encephalopathy    Elevated troponin           Brief Hospital Course to date:  Dakota Brizuela is a 87 y.o. male with a past medical history significant for hypertension, HLD, hypothyroidism, and PSP who presents s/p fall early this morning around 0330. Down for ~12hrs and confused.     Assessment & Plan:    - Acute metabolic encephalopathy: Improving. From rhabdo  - Traumatic rhabdo: IVF decreased to 100cc/hr. CK, myoglobin decreased by half compared to admission. Monitor electrolytes  - KRYSTA: Improving. Baseline Cr likely normal. Likely 2/2 rhabdo  - Urinary retention: Continue tamsulosin and finasteride  - HTN: Home atenolol, amlodipine  - Episodic tachycardia: Unable to tell from telemetry strip A. Fib with RVR vs. PACs. Due to underlying Parkinson's. Keep K>4.0 and Mg>2.0  - PSP: At baseline    DVT Prophylaxis:  [facility]    CODE STATUS: Conditional Code     Disposition: I expect the patient to be discharged home in 1-2 days    Tricia Fatima MD  03/22/18  7:17 AM

## 2018-03-22 NOTE — PROGRESS NOTES
Continued Stay Note  [facility]     Patient Name: Dakota Brizuela  MRN: [id number]  Today's Date: 3/22/2018    Admit Date: 3/20/2018          Discharge Plan     Row Name 03/22/18 1102       Plan    Plan placement update    Patient/Family in Agreement with Plan yes    Plan Comments [facility] called and offered pt a male skilled bed for d/c rehab when pt is medically ready. Family can transport. CM will continue to follow.               Discharge Codes    No documentation.       Expected Discharge Date and Time     Expected Discharge Date Expected Discharge Time    Mar 26, 2018             Narda Brantley, RN

## 2018-03-23 LAB
ALBUMIN SERPL-MCNC: 3.3 G/DL (ref 3.2–4.8)
ANION GAP SERPL CALCULATED.3IONS-SCNC: 8 MMOL/L (ref 3–11)
BUN BLD-MCNC: 12 MG/DL (ref 9–23)
BUN/CREAT SERPL: 13.3 (ref 7–25)
CALCIUM SPEC-SCNC: 8.4 MG/DL (ref 8.7–10.4)
CHLORIDE SERPL-SCNC: 110 MMOL/L (ref 99–109)
CK SERPL-CCNC: 1060 U/L (ref 26–174)
CO2 SERPL-SCNC: 22 MMOL/L (ref 20–31)
CREAT BLD-MCNC: 0.9 MG/DL (ref 0.6–1.3)
GFR SERPL CREATININE-BSD FRML MDRD: 80 ML/MIN/1.73
GLUCOSE BLD-MCNC: 94 MG/DL (ref 70–100)
MAGNESIUM SERPL-MCNC: 3.1 MG/DL (ref 1.3–2.7)
MYOGLOBIN SERPL-MCNC: 434 NG/ML (ref 3–110)
PHOSPHATE SERPL-MCNC: 3.4 MG/DL (ref 2.4–5.1)
POTASSIUM BLD-SCNC: 3.5 MMOL/L (ref 3.5–5.5)
SODIUM BLD-SCNC: 140 MMOL/L (ref 132–146)

## 2018-03-23 PROCEDURE — 97116 GAIT TRAINING THERAPY: CPT

## 2018-03-23 PROCEDURE — 93010 ELECTROCARDIOGRAM REPORT: CPT | Performed by: INTERNAL MEDICINE

## 2018-03-23 PROCEDURE — 83735 ASSAY OF MAGNESIUM: CPT | Performed by: HOSPITALIST

## 2018-03-23 PROCEDURE — 25010000002 HEPARIN (PORCINE) PER 1000 UNITS: Performed by: HOSPITALIST

## 2018-03-23 PROCEDURE — 83874 ASSAY OF MYOGLOBIN: CPT | Performed by: HOSPITALIST

## 2018-03-23 PROCEDURE — 93005 ELECTROCARDIOGRAM TRACING: CPT | Performed by: HOSPITALIST

## 2018-03-23 PROCEDURE — 97110 THERAPEUTIC EXERCISES: CPT

## 2018-03-23 PROCEDURE — 99233 SBSQ HOSP IP/OBS HIGH 50: CPT | Performed by: HOSPITALIST

## 2018-03-23 PROCEDURE — 80069 RENAL FUNCTION PANEL: CPT | Performed by: HOSPITALIST

## 2018-03-23 PROCEDURE — 82550 ASSAY OF CK (CPK): CPT | Performed by: HOSPITALIST

## 2018-03-23 RX ORDER — ATENOLOL 25 MG/1
12.5 TABLET ORAL DAILY
Status: DISCONTINUED | OUTPATIENT
Start: 2018-03-23 | End: 2018-03-23 | Stop reason: SDUPTHER

## 2018-03-23 RX ORDER — ATENOLOL 25 MG/1
12.5 TABLET ORAL ONCE
Status: DISCONTINUED | OUTPATIENT
Start: 2018-03-23 | End: 2018-03-24 | Stop reason: HOSPADM

## 2018-03-23 RX ORDER — ATENOLOL 25 MG/1
25 TABLET ORAL
Status: DISCONTINUED | OUTPATIENT
Start: 2018-03-24 | End: 2018-03-24 | Stop reason: HOSPADM

## 2018-03-23 RX ADMIN — FINASTERIDE 5 MG: 5 TABLET, FILM COATED ORAL at 08:47

## 2018-03-23 RX ADMIN — ATENOLOL 12.5 MG: 25 TABLET ORAL at 08:46

## 2018-03-23 RX ADMIN — AMLODIPINE BESYLATE 2.5 MG: 2.5 TABLET ORAL at 08:47

## 2018-03-23 RX ADMIN — PANTOPRAZOLE SODIUM 40 MG: 40 TABLET, DELAYED RELEASE ORAL at 05:42

## 2018-03-23 RX ADMIN — HEPARIN SODIUM 5000 UNITS: 5000 INJECTION, SOLUTION INTRAVENOUS; SUBCUTANEOUS at 22:37

## 2018-03-23 RX ADMIN — LEVOTHYROXINE SODIUM 150 MCG: 75 TABLET ORAL at 05:42

## 2018-03-23 RX ADMIN — HEPARIN SODIUM 5000 UNITS: 5000 INJECTION, SOLUTION INTRAVENOUS; SUBCUTANEOUS at 13:58

## 2018-03-23 RX ADMIN — ATORVASTATIN CALCIUM 10 MG: 10 TABLET, FILM COATED ORAL at 08:47

## 2018-03-23 RX ADMIN — TAMSULOSIN HYDROCHLORIDE 0.4 MG: 0.4 CAPSULE ORAL at 08:47

## 2018-03-23 RX ADMIN — HEPARIN SODIUM 5000 UNITS: 5000 INJECTION, SOLUTION INTRAVENOUS; SUBCUTANEOUS at 05:42

## 2018-03-23 RX ADMIN — MAGNESIUM SULFATE IN WATER 4 G: 40 INJECTION, SOLUTION INTRAVENOUS at 01:37

## 2018-03-23 NOTE — PROGRESS NOTES
[facility] Medicine Services  PROGRESS NOTE    Patient Name: Dakota Brizuela  : 1930  MRN: [id number]    Date of Admission: 3/20/2018  Length of Stay: 3  Primary Care Physician: Sam Hernandez MD    Subjective   Subjective     CC:  FU Fall    HPI:  Pt reports feeling well. Complaints that he has been urinating a lot. No straining.    Review of Systems  Gen- No fevers, chills  CV- No chest pain, palpitations  Resp- No cough, dyspnea  GI- No N/V/D, abd pain    Otherwise ROS is negative except as mentioned in the HPI.    Objective   Objective     Vital Signs:   Temp:  [97.3 °F (36.3 °C)-98.2 °F (36.8 °C)] 97.3 °F (36.3 °C)  Heart Rate:  [59-76] 73  Resp:  [16-18] 17  BP: (113-178)/(60-96) 178/96        Physical Exam:  Constitutional: No acute distress, awake, alert on RA in bed  Eyes: PERRLA, sclerae anicteric, no conjunctival injection  HENT: NCAT, mucous membranes moist  Neck: Supple, no thyromegaly, no lymphadenopathy, trachea midline  Respiratory: Clear to auscultation bilaterally, nonlabored respirations   Cardiovascular: RRR, no murmurs, rubs, or gallops, palpable pedal pulses bilaterally  Gastrointestinal: Positive bowel sounds, soft, nontender to palpation, nondistended  Musculoskeletal: No bilateral ankle edema, no clubbing or cyanosis to extremities  Psychiatric: Appropriate affect, cooperative  Neurologic: Oriented x 3, strength symmetric in all extremities, Cranial Nerves grossly intact to confrontation, speech clear  Skin: No rashes    Results Reviewed:  I have personally reviewed current lab, radiology, and data and agree.      Results from last 7 days  Lab Units 18  0814 18  1633   WBC 10*3/mm3 9.66 13.82*   HEMOGLOBIN g/dL 10.7* 12.2*   HEMATOCRIT % 33.0* 37.3*   PLATELETS 10*3/mm3 255 291       Results from last 7 days  Lab Units 18  0640 18  0529 18  2156  18  0814 18  0059 18  1633   SODIUM  mmol/L 140 142  --   --  144  --   --  142   POTASSIUM mmol/L 3.5 3.8 4.1  < > 3.4*  --   --  3.7   CHLORIDE mmol/L 110* 113*  --   --  115*  --   --  107   CO2 mmol/L 22.0 19.0*  --   --  24.0  --   --  24.0   BUN mg/dL 12 16  --   --  24*  --   --  31*   CREATININE mg/dL 0.90 1.00  --   --  1.20  --   --  1.30   GLUCOSE mg/dL 94 93  --   --  97  --   --  135*   CALCIUM mg/dL 8.4* 8.2*  --   --  8.7  --   --  9.5   ALT (SGPT) U/L  --   --   --   --   --   --   --  41*   AST (SGOT) U/L  --   --   --   --   --   --   --  88*   TROPONIN I ng/mL  --   --   --   --   --  0.086* 0.078* 0.051*   < > = values in this interval not displayed.  No results found for: BNP  No results found for: PHART    Microbiology Results Abnormal     Procedure Component Value - Date/Time    Blood Culture - Blood, [[id number]]  (Normal) Collected:  03/20/18 1640    Lab Status:  Preliminary result Specimen:  Blood from Wrist, Right Updated:  03/22/18 1716     Blood Culture No growth at 2 days    Blood Culture - Blood, [[id number]]  (Normal) Collected:  03/20/18 1635    Lab Status:  Preliminary result Specimen:  Blood from Arm, Right Updated:  03/22/18 1716     Blood Culture No growth at 2 days    Influenza A & B, RT PCR - Swab, Nasopharynx [[id number]]  (Normal) Collected:  03/20/18 1719    Lab Status:  Final result Specimen:  Swab from Nasopharynx Updated:  03/20/18 1826     Influenza A PCR Not Detected     Influenza B PCR Not Detected          Imaging Results (last 24 hours)     ** No results found for the last 24 hours. **             I have reviewed the medications.    Assessment/Plan   Assessment / Plan     Hospital Problem List     * (Principal)Traumatic rhabdomyolysis    Hypertension    GERD (gastroesophageal reflux disease)    Hyperlipidemia    PSP (progressive supranuclear palsy)    Hyperglycemia    Leukocytosis    Low TSH level    Normocytic anemia    Acute metabolic encephalopathy    Elevated troponin           Brief Hospital Course to  date:  Dakota Brizuela is a 87 y.o. male with a past medical history significant for hypertension, HLD, hypothyroidism, and PSP who presents s/p fall early this morning around 0330. Down for ~12hrs and confused.     Assessment & Plan:    - Acute metabolic encephalopathy: Improving. From rhabdo  - Traumatic rhabdo: CK, myoglobin continuing to decrease. Monitor electrolytes. DC IVF. Encourage PO intake  - KRYSTA: Resolved. Likely 2/2 rhabdo  - Urinary retention: Continue tamsulosin and finasteride  - HTN: Home atenolol, amlodipine  - Suspect A. fib: Have not been able to capture it on a 12-lead so far. Appears to be in rate-controlled A. Fib on monitor. Keep K>4.0 and Mg>2.0. Will need to discuss risks and benefits of anticoagulation with patient and daughter given his impaired balance from his PSP  - PSP: At baseline    DVT Prophylaxis:  [facility]    CODE STATUS: Conditional Code     Disposition: I expect the patient to be discharged to [facility] in 1-2 days    Tricia Fatima MD  03/23/18  10:26 AM

## 2018-03-23 NOTE — PLAN OF CARE
Problem: Fall Risk (Adult)  Goal: Identify Related Risk Factors and Signs and Symptoms  Outcome: Ongoing (interventions implemented as appropriate)    Goal: Absence of Fall  Outcome: Ongoing (interventions implemented as appropriate)      Problem: Skin Injury Risk (Adult)  Goal: Identify Related Risk Factors and Signs and Symptoms  Outcome: Ongoing (interventions implemented as appropriate)      Problem: Patient Care Overview  Goal: Plan of Care Review  Outcome: Ongoing (interventions implemented as appropriate)   03/23/18 105   Coping/Psychosocial   Plan of Care Reviewed With patient   OTHER   Outcome Summary pt stable this shift. Sitting up in chair and tolerating well. Denies any pain/discomfort when asked by this nurse. Pt urinating frequently.      Goal: Individualization and Mutuality  Outcome: Ongoing (interventions implemented as appropriate)    Goal: Discharge Needs Assessment  Outcome: Ongoing (interventions implemented as appropriate)    Goal: Interprofessional Rounds/Family Conf  Outcome: Ongoing (interventions implemented as appropriate)

## 2018-03-23 NOTE — PROGRESS NOTES
Continued Stay Note  [facility]     Patient Name: Dakota Brizuela  MRN: [id number]  Today's Date: 3/23/2018    Admit Date: 3/20/2018          Discharge Plan     Row Name 03/23/18 1102       Plan    Plan Placement    Patient/Family in Agreement with Plan yes    Plan Comments Plan is a skilled bed at [facility] tomorrow, 3/24.  Family to transport.  Nurse to call report to [phone].  CM will fax transfer summary when available to [phone].  Please place a copy of transfer summary and any scripts in the packet to be sent with patient to the facility.  Discussed with nursing.  Left voicemail for patient's daughter.  CM will continue to follow.  Antoinette Vasquez RN [phone]              Discharge Codes    No documentation.       Expected Discharge Date and Time     Expected Discharge Date Expected Discharge Time    Mar 23, 2018             Antoinette Vasquez RN

## 2018-03-23 NOTE — PLAN OF CARE
Problem: Patient Care Overview  Goal: Plan of Care Review  Outcome: Ongoing (interventions implemented as appropriate)   03/23/18 1108   Coping/Psychosocial   Plan of Care Reviewed With patient   OTHER   Outcome Summary patient with improved gait mechanics with verbal cueing, up ambulating in hallway with rolling walker for support, followed with chair for safety.   Plan of Care Review   Progress improving

## 2018-03-23 NOTE — PLAN OF CARE
Problem: Fall Risk (Adult)  Goal: Identify Related Risk Factors and Signs and Symptoms  Outcome: Ongoing (interventions implemented as appropriate)    Goal: Absence of Fall  Outcome: Ongoing (interventions implemented as appropriate)    Goal: Identify Related Risk Factors and Signs and Symptoms  Outcome: Ongoing (interventions implemented as appropriate)      Problem: Skin Injury Risk (Adult)  Goal: Identify Related Risk Factors and Signs and Symptoms  Outcome: Ongoing (interventions implemented as appropriate)      Problem: Patient Care Overview  Goal: Plan of Care Review  Outcome: Ongoing (interventions implemented as appropriate)   03/23/18 0216   Coping/Psychosocial   Plan of Care Reviewed With patient   OTHER   Outcome Summary PT appeared to rest well. VSS. Voiding without difficulty. No new complaints.    Plan of Care Review   Progress improving     Goal: Individualization and Mutuality  Outcome: Ongoing (interventions implemented as appropriate)    Goal: Discharge Needs Assessment  Outcome: Ongoing (interventions implemented as appropriate)    Goal: Interprofessional Rounds/Family Conf  Outcome: Ongoing (interventions implemented as appropriate)

## 2018-03-23 NOTE — THERAPY TREATMENT NOTE
Acute Care - Physical Therapy Treatment Note  [facility]     Patient Name: Dakota Brizuela  : 1930  MRN: [id number]  Today's Date: 3/23/2018  Onset of Illness/Injury or Date of Surgery: 18  Date of Referral to PT: 18  Referring Physician: ANDREI Montemayor    Admit Date: 3/20/2018    Visit Dx:    ICD-10-CM ICD-9-CM   1. Traumatic rhabdomyolysis, initial encounter T79.6XXA 958.6   2. Inability to walk R26.2 719.7   3. Progressive supranuclear palsy G23.1 333.0   4. Impaired mobility and ADLs Z74.09 799.89   5. Pharyngeal dysphagia R13.13 787.23   6. Impaired functional mobility, balance, gait, and endurance Z74.09 V49.89     Patient Active Problem List   Diagnosis   • Primary osteoarthritis of right knee   • Hypertension   • GERD (gastroesophageal reflux disease)   • Hyperlipidemia   • PSP (progressive supranuclear palsy)   • Traumatic rhabdomyolysis   • Hyperglycemia   • Leukocytosis   • Low TSH level   • Normocytic anemia   • Acute metabolic encephalopathy   • Elevated troponin       Therapy Treatment    Therapy Treatment / Health Promotion    Treatment Time/Intention  Discipline: physical therapy assistant (18 0950 : Lizy Valladares PTA)  Document Type: therapy note (daily note) (18 0950 : Lizy Valladares PTA)  Subjective Information: no complaints (18 0950 : Lizy Valladares PTA)  Mode of Treatment: physical therapy (18 0950 : Lizy Valladares PTA)  Plan of Care Review  Plan of Care Reviewed With: patient (18 1108 : Lizy Valladares PTA)    Vitals/Pain/Safety  Pain Scale: FACES Pre/Post-Treatment  Pain: FACES Scale, Pretreatment: 0-->no hurt (18 0950 : Lizy Valladares PTA)  Pain: FACES Scale, Post-Treatment: 0-->no hurt (18 0950 : Lizy Valladares PTA)  Positioning and Restraints  Pre-Treatment Position: in bed (18 0950 : Lizy Valladares PTA)  Post Treatment Position: chair (18 0950 : Lizy Valladares PTA)  In  Chair: reclined, call light within reach, encouraged to call for assist, exit alarm on, on mechanical lift sling, waffle cushion (03/23/18 0950 : Lizy Valladares PTA)    Mobility,ADL,Motor, Modality  Bed Mobility Assessment/Treatment  Supine-Sit Ray (Bed Mobility): verbal cues, minimum assist (75% patient effort) (03/23/18 0950 : Lizy Valladares PTA)  Bed Mobility, Safety Issues: decreased use of arms for pushing/pulling, decreased use of legs for bridging/pushing (03/23/18 0950 : Lizy Valladares PTA)  Assistive Device (Bed Mobility): draw sheet, bed rails, head of bed elevated (03/23/18 0950 : Lizy Valladares PTA)  Comment (Bed Mobility): patient needs increased time to get to EOB, MIN for scooting (03/23/18 0950 : Lizy Valladares PTA)  Sit-Stand Transfer  Sit-Stand Ray (Transfers): verbal cues, moderate assist (50% patient effort), 2 person assist (03/23/18 0950 : Lizy Valladares PTA)  Assistive Device (Sit-Stand Transfers): walker, front-wheeled (03/23/18 0950 : Lizy Valladares PTA)  Stand-Sit Transfer  Stand-Sit Ray (Transfers): verbal cues, moderate assist (50% patient effort), 2 person assist (03/23/18 0950 : Lizy Valladares PTA)  Assistive Device (Stand-Sit Transfers): walker, front-wheeled (03/23/18 0950 : Lizy Valladares PTA)  Gait/Stairs Assessment/Training  Gait/Stairs Assessment/Training: gait/ambulation assistive device (03/23/18 0950 : Lizy Valladares PTA)  Ray Level (Gait): verbal cues, minimum assist (75% patient effort), 1 person to manage equipment (03/23/18 0950 : Lizy Valladares PTA)  Assistive Device (Gait): walker, front-wheeled (03/23/18 0950 : Lizy Valladares PTA)  Distance in Feet (Gait): 300 (03/23/18 0950 : Lizy Kelsey Safie, PTA)  Pattern (Gait): step-through (03/23/18 0950 : Lizy Valladares, QUIQUE)  Deviations/Abnormal Patterns (Gait): base of support, narrow, [place] decreased, festinating/shuffling,  gait speed decreased, stride length decreased, other (see comments) (03/23/18 0950 : Lizy Valladares PTA)  Comment (Gait/Stairs): tech follow with chair, with verbal cueing patient able to improve gait mechanics, gati limited by fatigue, assist to control walker (03/23/18 0950 : Lizy Valladares PTA)     Therapeutic Exercise  Lower Extremity Range of Motion (Therapeutic Exercise): hip flexion/extension, bilateral, knee flexion/extension, bilateral, ankle dorsiflexion/plantar flexion, bilateral (03/23/18 0950 : Lizy Valladares PTA)  Exercise Type (Therapeutic Exercise): AROM (active range of motion) (03/23/18 0950 : Lizy Valladares PTA)  Position (Therapeutic Exercise): seated (03/23/18 0950 : Lizy Valladares PTA)  Sets/Reps (Therapeutic Exercise): 1/10 (03/23/18 0950 : Lizy Valladares PTA)           ROM/MMT             Sensory, Edema, Orthotics          Cognition, Communication, Swallow  Cognitive Assessment/Intervention- PT/OT  Orientation Status (Cognition): oriented x 4 (03/23/18 0950 : Lizy Valladares PTA)  Safety Deficit (Cognitive): moderate deficit (03/23/18 0950 : Lizy Valladares PTA)  Personal Safety Interventions: fall prevention program maintained, gait belt, nonskid shoes/slippers when out of bed, other (see comments) (exit alarm) (03/23/18 0950 : Lizy Valladares PTA)    Outcome Summary               PT Rehab Goals     Row Name 03/21/18 4945             Bed Mobility Goal 1 (PT)    Activity/Assistive Device (Bed Mobility Goal 1, PT) sit to supine/supine to sit  -LR      [place] Level/Cues Needed (Bed Mobility Goal 1, PT) conditional independence  -LR      Time Frame (Bed Mobility Goal 1, PT) long term goal (LTG);5 days  -LR      Progress/Outcomes (Bed Mobility Goal 1, PT) goal ongoing  -LR         Transfer Goal 1 (PT)    Activity/Assistive Device (Transfer Goal 1, PT) sit-to-stand/stand-to-sit;walker, rolling  -LR      [place] Level/Cues Needed (Transfer Goal  1, PT) conditional independence  -LR      Time Frame (Transfer Goal 1, PT) long term goal (LTG);3 days  -LR      Progress/Outcome (Transfer Goal 1, PT) goal ongoing  -LR         Gait Training Goal 1 (PT)    Activity/Assistive Device (Gait Training Goal 1, PT) gait (walking locomotion);walker, rolling  -LR      [place] Level (Gait Training Goal 1, PT) contact guard assist  -LR      Distance (Gait Goal 1, PT) 150 feet  -LR      Time Frame (Gait Training Goal 1, PT) long term goal (LTG);5 days  -LR      Progress/Outcome (Gait Training Goal 1, PT) goal ongoing  -LR        User Key  (r) = Recorded By, (t) = Taken By, (c) = Cosigned By    Initials Name Provider Type    LR Nicole Ford, PT Physical Therapist          Physical Therapy Education     Title: PT OT SLP Therapies (Active)     Topic: Physical Therapy (Active)     Point: Mobility training (Active)    Learning Progress Summary     Learner Status Readiness Method Response Comment Documented by    Patient Active Acceptance E NR  AS 03/23/18 1108     Done Acceptance E VU Pt educated on proper body mechanics with exercises, transfers and gait training. AD 03/22/18 1410     Done Acceptance E,D VU,NR Educated on correct gait mechanics and safety with mobility. LR 03/21/18 1748    Family Done Acceptance E,D VU,NR Educated on correct gait mechanics and safety with mobility. LR 03/21/18 1748          Point: Home exercise program (Active)    Learning Progress Summary     Learner Status Readiness Method Response Comment Documented by    Patient Active Acceptance E NR  AS 03/23/18 1108     Done Acceptance E VU Pt educated on proper body mechanics with exercises, transfers and gait training. AD 03/22/18 1410     Done Acceptance E,D VU,NR Educated on correct gait mechanics and safety with mobility. LR 03/21/18 1748    Family Done Acceptance E,D VU,NR Educated on correct gait mechanics and safety with mobility. LR 03/21/18 1748          Point: Body mechanics  (Active)    Learning Progress Summary     Learner Status Readiness Method Response Comment Documented by    Patient Active Acceptance E NR  AS 03/23/18 1108     Done Acceptance E VU Pt educated on proper body mechanics with exercises, transfers and gait training. AD 03/22/18 1410     Done Acceptance E,D VU,NR Educated on correct gait mechanics and safety with mobility. LR 03/21/18 1748    Family Done Acceptance E,D VU,NR Educated on correct gait mechanics and safety with mobility. LR 03/21/18 1748          Point: Precautions (Active)    Learning Progress Summary     Learner Status Readiness Method Response Comment Documented by    Patient Active Acceptance E NR  AS 03/23/18 1108     Done Acceptance E VU Pt educated on proper body mechanics with exercises, transfers and gait training. AD 03/22/18 1410     Done Acceptance E,D VU,NR Educated on correct gait mechanics and safety with mobility. LR 03/21/18 1748    Family Done Acceptance E,D VU,NR Educated on correct gait mechanics and safety with mobility. LR 03/21/18 1748                      User Key     Initials Effective Dates Name Provider Type Discipline    LR 06/19/15 -  Nicole Ford, PT Physical Therapist PT    AS 06/22/15 -  Lizy Valladares, PTA Physical Therapy Assistant PT    AD 03/07/18 -  Rashida Nagel, PT Student PT Student PT                    PT Recommendation and Plan     Plan of Care Reviewed With: patient  Progress: improving  Outcome Summary: patient with improved gait mechanics with verbal cueing, up ambulating in hallway with rolling walker for support, followed with chair for safety.          Outcome Measures     Row Name 03/23/18 0950 03/22/18 1410 03/22/18 1031       How much help from another person do you currently need...    Turning from your back to your side while in flat bed without using bedrails? 3  -AS 3  -LR (r) AD (t) LR (c)  --    Moving from lying on back to sitting on the side of a flat bed without bedrails? 3   -AS 3  -LR (r) AD (t) LR (c)  --    Moving to and from a bed to a chair (including a wheelchair)? 2  -AS 2  -LR (r) AD (t) LR (c)  --    Standing up from a chair using your arms (e.g., wheelchair, bedside chair)? 2  -AS 2  -LR (r) AD (t) LR (c)  --    Climbing 3-5 steps with a railing? 1  -AS 1  -LR (r) AD (t) LR (c)  --    To walk in hospital room? 3  -AS 3  -LR (r) AD (t) LR (c)  --    -PAC 6 Clicks Score 14  -AS 14  -LR (r) AD (t)  --       How much help from another is currently needed...    Putting on and taking off regular lower body clothing?  --  -- 3  -ST    Bathing (including washing, rinsing, and drying)  --  -- 2  -ST    Toileting (which includes using toilet bed pan or urinal)  --  -- 2  -ST    Putting on and taking off regular upper body clothing  --  -- 3  -ST    Taking care of personal grooming (such as brushing teeth)  --  -- 3  -ST    Eating meals  --  -- 2  -ST    Score  --  -- 15  -ST       Functional Assessment    Outcome Measure Options -[facility] 6 Clicks Basic Mobility (PT)  -AS [facility] 6 Clicks Basic Mobility (PT)  -LR (r) AD (t) LR (c)  --    Row Name 03/21/18 1635 03/21/18 0948          How much help from another person do you currently need...    Turning from your back to your side while in flat bed without using bedrails? 3  -LR  --     Moving from lying on back to sitting on the side of a flat bed without bedrails? 3  -LR  --     Moving to and from a bed to a chair (including a wheelchair)? 2  -LR  --     Standing up from a chair using your arms (e.g., wheelchair, bedside chair)? 2  -LR  --     Climbing 3-5 steps with a railing? 1  -LR  --     To walk in hospital room? 3  -LR  --     AM-PAC 6 Clicks Score 14  -LR  --        How much help from another is currently needed...    Putting on and taking off regular lower body clothing?  -- 1  -AN     Bathing (including washing, rinsing, and drying)  -- 1  -AN     Toileting (which includes using toilet bed pan or urinal)  -- 1  -AN     Putting on  and taking off regular upper body clothing  -- 2  -AN     Taking care of personal grooming (such as brushing teeth)  -- 2  -AN     Eating meals  -- 2  -AN     Score  -- 9  -AN        Functional Assessment    Outcome Measure Options AM-PAC 6 Clicks Basic Mobility (PT)  -LR AM-PAC 6 Clicks Daily Activity (OT)  -AN       User Key  (r) = Recorded By, (t) = Taken By, (c) = Cosigned By    Initials Name Provider Type     Humairaleda Peace, OTR Occupational Therapist    JOSE Perry, OT Occupational Therapist    LR Nicole Ford, PT Physical Therapist    AS Lizy Valladares PTA Physical Therapy Assistant    IVON Nagel, PT Student PT Student           Time Calculation:         PT Charges     Row Name 03/23/18 1110             Time Calculation    Start Time 0950  -AS      PT Received On 03/23/18  -AS      PT Goal Re-Cert Due Date 03/31/18  -AS         Time Calculation- PT    Total Timed Code Minutes- PT 25 minute(s)  -AS        User Key  (r) = Recorded By, (t) = Taken By, (c) = Cosigned By    Initials Name Provider Type    AS Lizy Valladares PTA Physical Therapy Assistant          Therapy Charges for Today     Code Description Service Date Service Provider Modifiers Qty    31766176114 HC GAIT TRAINING EA 15 MIN 3/23/2018 Lizy Valladares PTA GP 1    09113496371 HC PT THER PROC EA 15 MIN 3/23/2018 Lizy Valladares PTA GP 1    18091385750 HC PT THER SUPP EA 15 MIN 3/23/2018 Lizy Valladares PTA GP 2          PT G-Codes  Outcome Measure Options: AM-PAC 6 Clicks Basic Mobility (PT)    Lizy Valladares PTA  3/23/2018

## 2018-03-24 VITALS
HEART RATE: 60 BPM | TEMPERATURE: 97.6 F | SYSTOLIC BLOOD PRESSURE: 145 MMHG | BODY MASS INDEX: 24.93 KG/M2 | OXYGEN SATURATION: 99 % | HEIGHT: 69 IN | RESPIRATION RATE: 18 BRPM | WEIGHT: 168.3 LBS | DIASTOLIC BLOOD PRESSURE: 78 MMHG

## 2018-03-24 LAB
ALBUMIN SERPL-MCNC: 3.3 G/DL (ref 3.2–4.8)
ANION GAP SERPL CALCULATED.3IONS-SCNC: 9 MMOL/L (ref 3–11)
BUN BLD-MCNC: 11 MG/DL (ref 9–23)
BUN/CREAT SERPL: 12.2 (ref 7–25)
CALCIUM SPEC-SCNC: 8.4 MG/DL (ref 8.7–10.4)
CHLORIDE SERPL-SCNC: 107 MMOL/L (ref 99–109)
CK SERPL-CCNC: 519 U/L (ref 26–174)
CO2 SERPL-SCNC: 24 MMOL/L (ref 20–31)
CREAT BLD-MCNC: 0.9 MG/DL (ref 0.6–1.3)
DEPRECATED RDW RBC AUTO: 43.6 FL (ref 37–54)
ERYTHROCYTE [DISTWIDTH] IN BLOOD BY AUTOMATED COUNT: 13.1 % (ref 11.3–14.5)
GFR SERPL CREATININE-BSD FRML MDRD: 80 ML/MIN/1.73
GLUCOSE BLD-MCNC: 95 MG/DL (ref 70–100)
HCT VFR BLD AUTO: 32.5 % (ref 38.9–50.9)
HGB BLD-MCNC: 10.6 G/DL (ref 13.1–17.5)
MCH RBC QN AUTO: 30.1 PG (ref 27–31)
MCHC RBC AUTO-ENTMCNC: 32.6 G/DL (ref 32–36)
MCV RBC AUTO: 92.3 FL (ref 80–99)
MYOGLOBIN SERPL-MCNC: 328 NG/ML (ref 3–110)
PHOSPHATE SERPL-MCNC: 4 MG/DL (ref 2.4–5.1)
PLATELET # BLD AUTO: 265 10*3/MM3 (ref 150–450)
PMV BLD AUTO: 10 FL (ref 6–12)
POTASSIUM BLD-SCNC: 3.4 MMOL/L (ref 3.5–5.5)
RBC # BLD AUTO: 3.52 10*6/MM3 (ref 4.2–5.76)
SODIUM BLD-SCNC: 140 MMOL/L (ref 132–146)
WBC NRBC COR # BLD: 5.97 10*3/MM3 (ref 3.5–10.8)

## 2018-03-24 PROCEDURE — 25010000002 HEPARIN (PORCINE) PER 1000 UNITS: Performed by: HOSPITALIST

## 2018-03-24 PROCEDURE — 83874 ASSAY OF MYOGLOBIN: CPT | Performed by: HOSPITALIST

## 2018-03-24 PROCEDURE — 82550 ASSAY OF CK (CPK): CPT | Performed by: HOSPITALIST

## 2018-03-24 PROCEDURE — 80069 RENAL FUNCTION PANEL: CPT | Performed by: HOSPITALIST

## 2018-03-24 PROCEDURE — 97535 SELF CARE MNGMENT TRAINING: CPT

## 2018-03-24 PROCEDURE — 85027 COMPLETE CBC AUTOMATED: CPT | Performed by: HOSPITALIST

## 2018-03-24 PROCEDURE — 99239 HOSP IP/OBS DSCHRG MGMT >30: CPT | Performed by: NURSE PRACTITIONER

## 2018-03-24 PROCEDURE — G8996 SWALLOW CURRENT STATUS: HCPCS

## 2018-03-24 PROCEDURE — G8997 SWALLOW GOAL STATUS: HCPCS

## 2018-03-24 PROCEDURE — 92507 TX SP LANG VOICE COMM INDIV: CPT

## 2018-03-24 RX ORDER — FINASTERIDE 5 MG/1
5 TABLET, FILM COATED ORAL DAILY
Qty: 30 TABLET | Refills: 0 | Status: SHIPPED | OUTPATIENT
Start: 2018-03-25 | End: 2018-06-26 | Stop reason: HOSPADM

## 2018-03-24 RX ORDER — TAMSULOSIN HYDROCHLORIDE 0.4 MG/1
0.4 CAPSULE ORAL DAILY
Qty: 30 CAPSULE | Refills: 0 | Status: SHIPPED | OUTPATIENT
Start: 2018-03-25 | End: 2018-04-01 | Stop reason: HOSPADM

## 2018-03-24 RX ADMIN — POTASSIUM CHLORIDE 40 MEQ: 1.5 POWDER, FOR SOLUTION ORAL at 11:10

## 2018-03-24 RX ADMIN — AMLODIPINE BESYLATE 2.5 MG: 2.5 TABLET ORAL at 08:58

## 2018-03-24 RX ADMIN — FINASTERIDE 5 MG: 5 TABLET, FILM COATED ORAL at 08:58

## 2018-03-24 RX ADMIN — ATORVASTATIN CALCIUM 10 MG: 10 TABLET, FILM COATED ORAL at 08:58

## 2018-03-24 RX ADMIN — ATENOLOL 25 MG: 25 TABLET ORAL at 08:58

## 2018-03-24 RX ADMIN — LEVOTHYROXINE SODIUM 150 MCG: 75 TABLET ORAL at 06:32

## 2018-03-24 RX ADMIN — PANTOPRAZOLE SODIUM 40 MG: 40 TABLET, DELAYED RELEASE ORAL at 06:32

## 2018-03-24 RX ADMIN — TAMSULOSIN HYDROCHLORIDE 0.4 MG: 0.4 CAPSULE ORAL at 08:58

## 2018-03-24 RX ADMIN — HEPARIN SODIUM 5000 UNITS: 5000 INJECTION, SOLUTION INTRAVENOUS; SUBCUTANEOUS at 06:32

## 2018-03-24 NOTE — DISCHARGE SUMMARY
[facility] Medicine Services  DISCHARGE SUMMARY    Patient Name: Dakota Brizuela  : 1930  MRN: [id number]    Date of Admission: 3/20/2018  Date of Discharge:  3/24/18  Primary Care Physician: Sam Hernandez MD    Consults     No orders found from 2018 to 3/21/2018.        Hospital Course     Presenting Problem:   Traumatic rhabdomyolysis, initial encounter [T79.6XXA]    Active Hospital Problems (** Indicates Principal Problem)    Diagnosis Date Noted   • **Traumatic rhabdomyolysis [T79.6XXA] 2018   • Hypertension [I10] 2018   • GERD (gastroesophageal reflux disease) [K21.9] 2018   • Hyperlipidemia [E78.5] 2018   • PSP (progressive supranuclear palsy) [G23.1] 2018   • Hyperglycemia [R73.9] 2018   • Leukocytosis [D72.829] 2018   • Low TSH level [R94.6] 2018   • Normocytic anemia [D64.9] 2018   • Acute metabolic encephalopathy [G93.41] 2018   • Elevated troponin [R74.8] 2018      Resolved Hospital Problems    Diagnosis Date Noted Date Resolved   No resolved problems to display.          Hospital Course:  Dakota Brizuela is a 87 y.o. male  with a past medical history significant for hypertension, HLD, hypothyroidism, and PSP who presented s/p fall. Down for approximately 12hrs and confused.  Admitted hospital medicine for further evaluation.  CT head negative and UA negative.  Chest x-ray positive for bibasilar infiltrates worrisome for aspiration patient was treated with Zosyn and obtain speech evaluation.  Diet recommendations per speech eval, continue aspiration precautions with upper posture during and after eating with small bites of food and sips of liquid at facility.  Acute metabolic encephalopathy appeared to be from traumatic rhabdomyolysis.  CK, myoglobin were elevated and continued to have a decreased trend.  Patient responded well to IV hydration and AK I that is now resolved that was likely  secondary to rhabdo as well.  TSH levels low and patient's Synthroid was decreased to 150 mics daily, needs to follow-up with PCP in 4-6 weeks for TSH repeat. Mentation is improving and patient is back to baseline.  Patient did encounter urinary retention and was started on alpha blockers and has had resolution of urine retention.  There was suspect for atrial fibrillation vs NSR with PAC's due to irregular tachycardia on telemetry, but is possible could be from Parkinson's.  12-lead EKGs have concluded patient remains in normal sinus rhythm or sinus bradycardia.  No confirmed atrial fibrillation on 12-lead, and will defer anticoagulation at this time but this will need to be discussed with risk and benefits with the family given patient's impaired balance from his PSP if it is determined he needs in the future.  Patient is now back to baseline and will be transferred to [facility] today by family.  Needs follow-up with PCP in one week.              Day of Discharge     HPI:   Resting quietly in bed with eyes closed, arouses easily.  NAD.  No family present.  Patient's confusion has resolved and appears to be back to baseline.  Urinary retention has resolved and patient is voiding spontaneously.  Patient smiles, and is eager to return to [facility] today. Denies f/c, n/v/d, soa or cp     Review of Systems  Otherwise ROS is negative except as mentioned in the HPI.    Vital Signs:   Temp:  [97.5 °F (36.4 °C)-98.4 °F (36.9 °C)] 98.2 °F (36.8 °C)  Heart Rate:  [61-74] 74  Resp:  [17-18] 18  BP: (124-160)/(75-78) 154/76     Physical Exam:  Constitutional: No acute distress, drowsy/ arouses easily   Eyes: PERRLA, sclerae anicteric, no conjunctival injection  HENT: NCAT, mucous membranes moist  Neck: Supple, no thyromegaly, no lymphadenopathy, trachea midline  Respiratory: Clear to auscultation bilaterally, nonlabored respirations   Cardiovascular: RRR, no murmurs, rubs, or gallops, palpable pedal pulses  bilaterally  Gastrointestinal: Positive bowel sounds, soft, nontender, nondistended  Musculoskeletal: No bilateral ankle edema, no clubbing or cyanosis to extremities  Psychiatric: Appropriate affect, cooperative  Neurologic: Oriented x 3, strength symmetric in all extremities, Cranial Nerves grossly intact to confrontation, speech clear  Skin: No rashes     Pertinent  and/or Most Recent Results       Results from last 7 days  Lab Units 03/24/18  0822 03/23/18  0640 03/22/18  0529 03/21/18  2156 03/21/18  1644 03/21/18  0814 03/20/18  1633   WBC 10*3/mm3 5.97  --   --   --   --  9.66 13.82*   HEMOGLOBIN g/dL 10.6*  --   --   --   --  10.7* 12.2*   HEMATOCRIT % 32.5*  --   --   --   --  33.0* 37.3*   PLATELETS 10*3/mm3 265  --   --   --   --  255 291   SODIUM mmol/L 140 140 142  --   --  144 142   POTASSIUM mmol/L 3.4* 3.5 3.8 4.1 3.6 3.4* 3.7   CHLORIDE mmol/L 107 110* 113*  --   --  115* 107   CO2 mmol/L 24.0 22.0 19.0*  --   --  24.0 24.0   BUN mg/dL 11 12 16  --   --  24* 31*   CREATININE mg/dL 0.90 0.90 1.00  --   --  1.20 1.30   GLUCOSE mg/dL 95 94 93  --   --  97 135*   CALCIUM mg/dL 8.4* 8.4* 8.2*  --   --  8.7 9.5       Results from last 7 days  Lab Units 03/20/18  1633   BILIRUBIN mg/dL 0.6   ALK PHOS U/L 69   ALT (SGPT) U/L 41*   AST (SGOT) U/L 88*           Invalid input(s): TG, LDLCALC, LDLREALC    Results from last 7 days  Lab Units 03/21/18  0814 03/21/18  0059 03/20/18  2055 03/20/18  1633   TSH mIU/mL  --   --   --  0.144*   HEMOGLOBIN A1C % 5.80*  --   --   --    TROPONIN I ng/mL  --  0.086* 0.078* 0.051*     Brief Urine Lab Results  (Last result in the past 365 days)      Color   Clarity   Blood   Leuk Est   Nitrite   Protein   CREAT   Urine HCG        03/20/18 1731 Yellow Cloudy(A) Large (3+)(A) Negative Negative 30 mg/dL (1+)(A)               Microbiology Results Abnormal     Procedure Component Value - Date/Time    Blood Culture - Blood, [[id number]]  (Normal) Collected:  03/20/18 1640    Lab  Status:  Preliminary result Specimen:  Blood from Wrist, Right Updated:  03/23/18 1716     Blood Culture No growth at 3 days    Blood Culture - Blood, [[id number]]  (Normal) Collected:  03/20/18 1635    Lab Status:  Preliminary result Specimen:  Blood from Arm, Right Updated:  03/23/18 1716     Blood Culture No growth at 3 days    Influenza A & B, RT PCR - Swab, Nasopharynx [[id number]]  (Normal) Collected:  03/20/18 1719    Lab Status:  Final result Specimen:  Swab from Nasopharynx Updated:  03/20/18 1826     Influenza A PCR Not Detected     Influenza B PCR Not Detected          Imaging Results (all)     Procedure Component Value Units Date/Time    XR Pelvis 1 or 2 View [[id number]] Collected:  03/20/18 1734     Updated:  03/22/18 0841    Narrative:       EXAMINATION: XR PELVIS 1 OR 2 VW-03/20/2018:      INDICATION: Fall from standing.      COMPARISON: NONE.     FINDINGS: No displaced pelvic ring fracture with SI joints and pubic  symphysis well approximated. Femoral heads are well seated within the  acetabular cups. No discrete fracture within the proximal femurs.  Background degenerative changes of the SI joints and bilateral hip  joints.       Impression:       No acute osseous abnormality specifically, no displaced  pelvic ring fracture. Background extensive degenerative changes.     D:  03/20/2018  E:  03/21/2018     This report was finalized on 3/22/2018 8:39 AM by Dr. Cornelio Muñoz.       XR Knee 1 or 2 View Bilateral [[id number]] Collected:  03/20/18 1736     Updated:  03/22/18 0808    Narrative:       EXAMINATION: XR KNEE 1 OR 2 VW, BILATERAL-03/20/2018:      INDICATION: Fall onto knees.      COMPARISON: 09/21/2017.     FINDINGS: Bipartite patella with smooth cortical margins noted  posterolateral segment unchanged from 09/21/2017. No acute fracture or  osseous malalignment is observed with tricompartmental degenerative  disease including osteophytosis and joint space narrowing greatest in  the patellofemoral  and medial femorotibial compartments.    Small-to-moderate suprapatellar joint effusion.        Impression:       No acute osseous abnormality with extensive background  degenerative changes and joint space narrowing in the medial  femorotibial compartment along with redemonstration of bipartite  patella. Small-to-moderate suprapatellar joint effusion.     D:  03/20/2018  E:  03/21/2018     This report was finalized on 3/22/2018 8:06 AM by Dr. Cornelio Muñoz.       FL Video Swallow With Speech [[id number]] Collected:  03/21/18 1412     Updated:  03/21/18 1653    Narrative:       EXAMINATION: FL VIDEO SWALLOW W SPEECH-     INDICATION: dysphagia; T79.6XXA-Traumatic ischemia of muscle, initial  encounter; R26.2-Difficulty in walking, not elsewhere classified;  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 1  associated image was saved. The patient was evaluated in the seated  lateral position while taking a variety of consistencies of barium by  mouth under the direction of speech pathology.     COMPARISON: NONE     FINDINGS: There was penetration to the level of the vocal folds with  multiple large sized sips of thin barium from a straw. There was no  penetration and no aspiration with small single sips of thin barium from  a straw, cup, or teaspoon. There was no penetration and no aspiration  with pudding, or solid consistency barium.          Impression:       Fluoroscopy provided for a modified barium swallow. Please  see speech therapy report for full details and recommendations.         This report was finalized on 3/21/2018 4:51 PM by Dr. Tk Cleveland MD.       CT Head Without Contrast [[id number]] Collected:  03/21/18 0935     Updated:  03/21/18 1300    Narrative:       EXAMINATION: CT HEAD WO CONTRAST- 03/20/2018     INDICATION: T79.6XXA-Traumatic ischemia of muscle, initial  encounter;  R26.2-Difficulty in walking, not elsewhere classified; G23.1-Progressive  supranuclear ophthalmoplegia (Steele-Richardson-Olszewski)         TECHNIQUE: CT data set of the brain was performed without intravenous  contrast.     The radiation dose reduction device was turned on for each scan per the  ALARA (As Low as Reasonably Achievable) protocol.     COMPARISON: Compared to distant MR datasets of the brain 12/15/2015.     FINDINGS:   1. The foramen magnum is clear. The posterior fossa is unremarkable.  2. There is global atrophy in the brain both centrally and peripherally.  Cortical gray matter volume loss is noted over the convexities.  3. Otherwise, there is no evidence of intra-axial cerebral contusion or  hemorrhage. There is no extracerebral collection or midline shift. Mass  effect or edema is not identified.  4. Extended window datasets demonstrate paranasal sinuses to be clear.  Mastoids are clear. Skull base is intact. C1-C2 is well aligned. There  is no evidence of calvarial fracture.       Impression:       Negative CT data set of the brain and skull. There is no  evidence of acute intra-axial or intracranial process. There is no  cerebral contusion, edema, mass or extracerebral collection. Midline  shift is not identified.     D:  03/21/2018  E:  03/21/2018        This report was finalized on 3/21/2018 1:03 PM by Dr. Tk Cleveland MD.       XR Chest 1 View [[id number]] Collected:  03/20/18 1936     Updated:  03/20/18 2051    Narrative:       EXAM:    XR Chest, 1 View    CLINICAL HISTORY:    87 years old, male; Progressive supranuclear ophthalmoplegia   steele-richardson-olszewski; Difficulty in walking, not elsewhere classified;   Traumatic ischemia of muscle, initial encounter; Signs and symptoms; Fever    TECHNIQUE:    Frontal view of the chest.    COMPARISON:    No relevant prior studies available.    FINDINGS:    Prominent lung markings/peribronchial thickening with bibasal atelectasis  and   opacities in the lung bases bilaterally adjacent to the RIGHT cardiophrenic   angle.  Remainder the lungs are unremarkable. No pleural effusion or   pneumothorax.  Heart size is normal, cental pulmonary vascular congestion.    Unfolding of the aortic arch with a tortuous descending thoracic aorta.       Impression:         Chronic cardiopulmonary changes with findings suspicious for bibasal   aspiration pneumonia.  Recommend comparison with prior studies and followup to   complete resolution.       THIS DOCUMENT HAS BEEN ELECTRONICALLY SIGNED BY ATUL BUTLER MD                          Order Current Status    Myoglobin, Serum In process    Blood Culture - Blood, Preliminary result    Blood Culture - Blood, Preliminary result        Discharge Details      Dakota Brizuela   Home Medication Instructions CLIFFORD:[id number]    Printed on:03/24/18 9142   Medication Information                      amLODIPine (NORVASC) 2.5 MG tablet  Take 2.5 mg by mouth Daily.             atenolol (TENORMIN) 25 MG tablet  Take 25 mg by mouth Daily.             atorvastatin (LIPITOR) 10 MG tablet  Take 10 mg by mouth Every Morning.             cyanocobalamin 1000 MCG/ML injection  Inject 1,000 mcg under the skin Every 28 (Twenty-Eight) Days. On the 12th of every month.             finasteride (PROSCAR) 5 MG tablet  Take 1 tablet by mouth Daily.             levothyroxine (SYNTHROID, LEVOTHROID) 175 MCG tablet  Take 175 mcg by mouth Daily.             omeprazole (priLOSEC) 40 MG capsule  Take 40 mg by mouth Every Evening.             tamsulosin (FLOMAX) 0.4 MG capsule 24 hr capsule  Take 1 capsule by mouth Daily.                   Discharge Disposition:  Skilled Nursing Facility (DC - External)    Discharge Diet:  Diet Instructions     Diet: Regular, Soft Texture; Nectar / Syrup Thick Liquids; Chopped       Discharge Diet:   Regular  Soft Texture       Fluid Consistency:  Nectar / Syrup Thick Liquids    Soft Options:  Chopped           Discharge Activity:   Activity Instructions     Activity as Tolerated             No future appointments.    Additional Instructions for the Follow-ups that You Need to Schedule     Discharge Follow-up with PCP    As directed      Follow Up Details:  1 week               Time Spent on Discharge:  50 minutes    Electronically signed by SHANTE Perrin, 03/24/18, 11:57 AM.

## 2018-03-24 NOTE — THERAPY TREATMENT NOTE
Acute Care - Speech Language Pathology Treatment Note  [facility]   Swallowing treatment and Education Note     Patient Name: Dakota Brizuela  : 1930  MRN: [id number]  Today's Date: 3/24/2018         Admit Date: 3/20/2018    Visit Dx:      ICD-10-CM ICD-9-CM   1. Traumatic rhabdomyolysis, initial encounter T79.6XXA 958.6   2. Inability to walk R26.2 719.7   3. Progressive supranuclear palsy G23.1 333.0   4. Impaired mobility and ADLs Z74.09 799.89   5. Pharyngeal dysphagia R13.13 787.23   6. Impaired functional mobility, balance, gait, and endurance Z74.09 V49.89     Patient Active Problem List   Diagnosis   • Primary osteoarthritis of right knee   • Hypertension   • GERD (gastroesophageal reflux disease)   • Hyperlipidemia   • PSP (progressive supranuclear palsy)   • Traumatic rhabdomyolysis   • Hyperglycemia   • Leukocytosis   • Low TSH level   • Normocytic anemia   • Acute metabolic encephalopathy   • Elevated troponin        Therapy Treatment    Therapy Treatment / Health Promotion    Treatment Time/Intention  Discipline: speech language pathologist (18 1100 : Jacqueline Bautista CCC-SLP)  Document Type: therapy note (daily note) (18 1100 : Jacqueline Bautista CCC-SLP)  Subjective Information: no complaints (18 1100 : Jacqueline Bautista CCC-SLP)  Mode of Treatment: speech-language pathology (18 1100 : Jacqueline Bautista CCC-SLP)  Patient/Family Observations: Pt awake and talking.  Reports he is being discharged to [facility] today.   (18 1100 : Jacqueline Bautista CCC-SLP)  Care Plan Review: care plan/treatment goals reviewed, risks/benefits reviewed, patient/other agree to care plan, evaluation/treatment results reviewed (18 1100 : Jacqueline Bautista CCC-SLP)  Patient Effort: excellent (18 1100 : Jacqueline Bautista CCC-SLP)  Comment: Pt dislikes new adaptive cup.  Fearful of choking.  Agreeable to nectar thick liquids once introduced.  (18 1100 :  RAMEZ Iraheta)  Patient Response to Treatment: Excellent (03/24/18 1100 : Jacqueline P Lankster, CCC-SLP)    Vitals/Pain/Safety  Pain Assessment  Additional Documentation: Pain Scale: Numbers Pre/Post-Treatment (Group) (03/24/18 1100 : RAMEZ Iraheta)  Pain Scale: Numbers Pre/Post-Treatment  Pain Scale: Numbers, Pretreatment: 0/10 - no pain (03/24/18 1100 : RAMEZ Iraheta)    Cognition, Communication, Swallow  Swallow Assessment/Intervention  Additional Documentation: Clinical Impressions (03/24/18 1100 : RAMEZ Iraheta)  General Eating/Swallowing Observations  Eating/Swallowing Skills: self-fed (03/24/18 1100 : RAMEZ Iraheta)  Positioning During Eating: upright 90 degree (03/24/18 1100 : RAMEZ Iraheta)  Utensils Used: cup, straw, adapted cup (03/24/18 1100 : RAMEZ Iraheta)  Consistencies Trialed: thin liquids, nectar/syrup-thick liquids (03/24/18 1100 : RAMEZ Iraheta)  Clinical Swallow Eval  Clinical Swallow Evaluation Summary: Saw pt for treatment and education.  Pt exhibited overt pharyngeal signs with thin liquids despite compensatory strategies.  Agreeable to nectar trials.  No evidence of pharyngeal dysphagia with nectar regardless of bolus size/style of presentation.  Pt prefers nectar to thin liquids due to choking/dislike of compensatory strategies.  Instruction completed with pt re: nectar, ice/water after oral care, and need for further ST at SNF.  Instruction completed with RN.  Diet changed.  No family at bedside.   (03/24/18 1100 : RAMEZ Iraheta)  Clinical Impression  SLP Swallowing Diagnosis: suspected pharyngeal dysfunction (03/24/18 1100 : RAMEZ Iraheta)  Functional Impact: risk of aspiration/pneumonia (03/24/18 1100 : RAMEZ Iraheta)  Rehab Potential/Prognosis, Swallowing: other (see comments) (Pt is being discharged today to SNF- will need  ST at SNF) (03/24/18 1100 : Jacqueline Bautista CCC-SLP)  Criteria for Skilled Therapeutic Interventions Met: demonstrates skilled criteria (03/24/18 1100 : RAMEZ Iraheta)  Recommendations  Predicted Duration Therapy Intervention (Days): until discharge (03/24/18 1100 : RAMEZ Iraheta)  SLP Diet Recommendation: soft textures, nectar thick liquids (03/24/18 1100 : RAMEZ Iraheta)  Recommended Precautions and Strategies: upright posture during/after eating (03/24/18 1100 : RAMEZ Iraheta)  SLP Rec. for Method of Medication Administration: meds whole, meds crushed, with thick liquids (03/24/18 1100 : RAMEZ Iraheta)  Monitor for Signs of Aspiration: yes, notify SLP if any concerns, cough, gurgly voice (03/24/18 1100 : RAMEZ Iraheta)    Outcome Summary         EDUCATION  The patient has been educated in the following areas:   Dysphagia (Swallowing Impairment) Oral Care/Hydration.    SLP Recommendation and Plan        Criteria for Skilled Therapeutic Interventions Met: demonstrates skilled criteria           Predicted Duration Therapy Intervention (Days): until discharge                     SLP GOALS     Row Name 03/24/18 1130             Oral Nutrition/Hydration Goal 1 (SLP)    Oral Nutrition/Hydration Goal 1, SLP LTG: Pt will tolerate regular diet texture and thin liquids w/o s/sxs aspiration or complication w/ 100% acc w/o cues.  -ML      Time Frame (Oral Nutrition/Hydration Goal 1, SLP) by discharge  -ML      Progress/Outcomes (Oral Nutrition/Hydration Goal 1, SLP) goal no longer appropriate  -ML         Swallow Management Recall Goal (SLP)    Swallow Management Recall Goal (SLP) Pt will tolerate trials of solid items and thin liquid w/o s/sxs aspiration or complication w/o cues.  -ML      Barriers (Swallow Management Recall Goal, SLP) Pt recalls goal but overt pharyngeal signs with thin despite strategies.  -ML       Progress/Outcomes (Swallow Management Recall Goal, SLP) goal no longer appropriate  -ML         Swallow Compensatory Strategies Goal 1 (SLP)    Activity (Swallow Compensatory Strategies/Techniques Goal 1, SLP) --   Compensatory strategies are not required with nectar thick   -ML      Cuming/Accuracy (Swallow Compensatory Strategies/Techniques Goal 1, SLP) independently (over 90% accuracy)  -ML      Time Frame (Swallow Compensatory Strategies/Techniques Goal 1, SLP) short term goal (STG);by discharge  -ML      Progress/Outcomes (Swallow Compensatory Strategies/Techniques Goal 1, SLP) goal no longer appropriate  -ML        User Key  (r) = Recorded By, (t) = Taken By, (c) = Cosigned By    Initials Name Provider Type    ML RAMEZ Iraheta Speech and Language Pathologist                Time Calculation:           Time Calculation- SLP     Row Name 03/24/18 1208             Time Calculation- SLP    SLP Start Time 1130  -ML        User Key  (r) = Recorded By, (t) = Taken By, (c) = Cosigned By    Initials Name Provider Type    ML RAMEZ Iraheta Speech and Language Pathologist          Therapy Charges for Today     Code Description Service Date Service Provider Modifiers Qty    15514150462 HC ST SWALLOWING CURRENT STATUS 3/24/2018 RAMEZ Iraheta GN, CJ 1    96655238386 HC ST SWALLOWING PROJECTED 3/24/2018 RAMEZ Iraheta, CI 1    70511043311 HC ST TREATMENT SPEECH 4 3/24/2018 RAMEZ Iraheta GN 1    88587919595 HC ST SELF CARE/MGMT/TRAIN EA 15 MIN 3/24/2018 RAMEZ Iraheta GN 1              SLP G-Codes  SLP NOMS Used?: Yes  Functional Limitations: Swallowing  Swallow Current Status (): At least 20 percent but less than 40 percent impaired, limited or restricted  Swallow Goal Status (): At least 1 percent but less than 20 percent impaired, limited or restricted      RAMEZ Iraheta  3/24/2018

## 2018-03-24 NOTE — PLAN OF CARE
Problem: Fall Risk (Adult)  Goal: Identify Related Risk Factors and Signs and Symptoms  Outcome: Ongoing (interventions implemented as appropriate)      Problem: Skin Injury Risk (Adult)  Goal: Identify Related Risk Factors and Signs and Symptoms  Outcome: Ongoing (interventions implemented as appropriate)      Problem: Patient Care Overview  Goal: Plan of Care Review  Outcome: Outcome(s) achieved Date Met: 03/24/18    Goal: Individualization and Mutuality  Outcome: Ongoing (interventions implemented as appropriate)    Goal: Discharge Needs Assessment  Outcome: Ongoing (interventions implemented as appropriate)

## 2018-03-25 LAB
BACTERIA SPEC AEROBE CULT: NORMAL
BACTERIA SPEC AEROBE CULT: NORMAL

## 2018-03-30 ENCOUNTER — HOSPITAL ENCOUNTER (INPATIENT)
Facility: HOSPITAL | Age: 83
LOS: 2 days | Discharge: NURSING, REHAB OR LONG-TERM CARE | End: 2018-04-01
Attending: EMERGENCY MEDICINE | Admitting: FAMILY MEDICINE

## 2018-03-30 ENCOUNTER — APPOINTMENT (OUTPATIENT)
Dept: GENERAL RADIOLOGY | Facility: HOSPITAL | Age: 83
End: 2018-03-30

## 2018-03-30 ENCOUNTER — APPOINTMENT (OUTPATIENT)
Dept: CT IMAGING | Facility: HOSPITAL | Age: 83
End: 2018-03-30

## 2018-03-30 ENCOUNTER — ANESTHESIA EVENT (OUTPATIENT)
Dept: GASTROENTEROLOGY | Facility: HOSPITAL | Age: 83
End: 2018-03-30

## 2018-03-30 ENCOUNTER — ANESTHESIA (OUTPATIENT)
Dept: GASTROENTEROLOGY | Facility: HOSPITAL | Age: 83
End: 2018-03-30

## 2018-03-30 DIAGNOSIS — K92.0 HEMATEMESIS, PRESENCE OF NAUSEA NOT SPECIFIED: Primary | ICD-10-CM

## 2018-03-30 DIAGNOSIS — R93.3 ABNORMAL CT SCAN, GASTROINTESTINAL TRACT: ICD-10-CM

## 2018-03-30 DIAGNOSIS — G23.1 PSP (PROGRESSIVE SUPRANUCLEAR PALSY) (HCC): ICD-10-CM

## 2018-03-30 PROBLEM — E07.9 THYROID DISEASE: Status: ACTIVE | Noted: 2018-03-30

## 2018-03-30 PROBLEM — K52.9 ENTERITIS: Status: ACTIVE | Noted: 2018-03-30

## 2018-03-30 LAB
ALBUMIN SERPL-MCNC: 3.9 G/DL (ref 3.2–4.8)
ALBUMIN/GLOB SERPL: 1.5 G/DL (ref 1.5–2.5)
ALP SERPL-CCNC: 59 U/L (ref 25–100)
ALT SERPL W P-5'-P-CCNC: 52 U/L (ref 7–40)
ANION GAP SERPL CALCULATED.3IONS-SCNC: 13 MMOL/L (ref 3–11)
AST SERPL-CCNC: 28 U/L (ref 0–33)
BASOPHILS # BLD AUTO: 0.01 10*3/MM3 (ref 0–0.2)
BASOPHILS NFR BLD AUTO: 0.1 % (ref 0–1)
BILIRUB SERPL-MCNC: 0.5 MG/DL (ref 0.3–1.2)
BUN BLD-MCNC: 25 MG/DL (ref 9–23)
BUN/CREAT SERPL: 27.8 (ref 7–25)
CALCIUM SPEC-SCNC: 8.8 MG/DL (ref 8.7–10.4)
CHLORIDE SERPL-SCNC: 103 MMOL/L (ref 99–109)
CO2 SERPL-SCNC: 24 MMOL/L (ref 20–31)
CREAT BLD-MCNC: 0.9 MG/DL (ref 0.6–1.3)
D-LACTATE SERPL-SCNC: 1.7 MMOL/L (ref 0.5–2)
D-LACTATE SERPL-SCNC: 2.1 MMOL/L (ref 0.5–2)
DEPRECATED RDW RBC AUTO: 46.3 FL (ref 37–54)
DEVELOPER EXPIRATION DATE: ABNORMAL
DEVELOPER LOT NUMBER: ABNORMAL
EOSINOPHIL # BLD AUTO: 0.05 10*3/MM3 (ref 0–0.3)
EOSINOPHIL NFR BLD AUTO: 0.5 % (ref 0–3)
ERYTHROCYTE [DISTWIDTH] IN BLOOD BY AUTOMATED COUNT: 13.4 % (ref 11.3–14.5)
EXPIRATION DATE: ABNORMAL
FECAL OCCULT BLOOD SCREEN, POC: POSITIVE
GFR SERPL CREATININE-BSD FRML MDRD: 80 ML/MIN/1.73
GLOBULIN UR ELPH-MCNC: 2.6 GM/DL
GLUCOSE BLD-MCNC: 160 MG/DL (ref 70–100)
HCT VFR BLD AUTO: 36.7 % (ref 38.9–50.9)
HGB BLD-MCNC: 11.7 G/DL (ref 13.1–17.5)
HOLD SPECIMEN: NORMAL
IMM GRANULOCYTES # BLD: 0.04 10*3/MM3 (ref 0–0.03)
IMM GRANULOCYTES NFR BLD: 0.4 % (ref 0–0.6)
LIPASE SERPL-CCNC: 27 U/L (ref 6–51)
LYMPHOCYTES # BLD AUTO: 0.26 10*3/MM3 (ref 0.6–4.8)
LYMPHOCYTES NFR BLD AUTO: 2.5 % (ref 24–44)
Lab: ABNORMAL
MCH RBC QN AUTO: 30.2 PG (ref 27–31)
MCHC RBC AUTO-ENTMCNC: 31.9 G/DL (ref 32–36)
MCV RBC AUTO: 94.6 FL (ref 80–99)
MONOCYTES # BLD AUTO: 0.48 10*3/MM3 (ref 0–1)
MONOCYTES NFR BLD AUTO: 4.6 % (ref 0–12)
NEGATIVE CONTROL: NEGATIVE
NEUTROPHILS # BLD AUTO: 9.65 10*3/MM3 (ref 1.5–8.3)
NEUTROPHILS NFR BLD AUTO: 91.9 % (ref 41–71)
PLATELET # BLD AUTO: 330 10*3/MM3 (ref 150–450)
PMV BLD AUTO: 10.4 FL (ref 6–12)
POSITIVE CONTROL: POSITIVE
POTASSIUM BLD-SCNC: 3.9 MMOL/L (ref 3.5–5.5)
PROT SERPL-MCNC: 6.5 G/DL (ref 5.7–8.2)
RBC # BLD AUTO: 3.88 10*6/MM3 (ref 4.2–5.76)
SODIUM BLD-SCNC: 140 MMOL/L (ref 132–146)
WBC NRBC COR # BLD: 10.49 10*3/MM3 (ref 3.5–10.8)

## 2018-03-30 PROCEDURE — 74177 CT ABD & PELVIS W/CONTRAST: CPT

## 2018-03-30 PROCEDURE — 25010000002 PIPERACILLIN SOD-TAZOBACTAM PER 1 G: Performed by: FAMILY MEDICINE

## 2018-03-30 PROCEDURE — 25010000002 PROMETHAZINE PER 50 MG: Performed by: EMERGENCY MEDICINE

## 2018-03-30 PROCEDURE — 25010000002 PROPOFOL 1000 MG/ML EMULSION: Performed by: NURSE ANESTHETIST, CERTIFIED REGISTERED

## 2018-03-30 PROCEDURE — 0DJ08ZZ INSPECTION OF UPPER INTESTINAL TRACT, VIA NATURAL OR ARTIFICIAL OPENING ENDOSCOPIC: ICD-10-PCS | Performed by: INTERNAL MEDICINE

## 2018-03-30 PROCEDURE — 99222 1ST HOSP IP/OBS MODERATE 55: CPT | Performed by: INTERNAL MEDICINE

## 2018-03-30 PROCEDURE — 25010000002 IOPAMIDOL 61 % SOLUTION: Performed by: EMERGENCY MEDICINE

## 2018-03-30 PROCEDURE — 85025 COMPLETE CBC W/AUTO DIFF WBC: CPT | Performed by: EMERGENCY MEDICINE

## 2018-03-30 PROCEDURE — 71045 X-RAY EXAM CHEST 1 VIEW: CPT

## 2018-03-30 PROCEDURE — 99223 1ST HOSP IP/OBS HIGH 75: CPT | Performed by: FAMILY MEDICINE

## 2018-03-30 PROCEDURE — 99285 EMERGENCY DEPT VISIT HI MDM: CPT

## 2018-03-30 PROCEDURE — 80053 COMPREHEN METABOLIC PANEL: CPT | Performed by: EMERGENCY MEDICINE

## 2018-03-30 PROCEDURE — 82270 OCCULT BLOOD FECES: CPT | Performed by: EMERGENCY MEDICINE

## 2018-03-30 PROCEDURE — 36415 COLL VENOUS BLD VENIPUNCTURE: CPT

## 2018-03-30 PROCEDURE — 83605 ASSAY OF LACTIC ACID: CPT | Performed by: EMERGENCY MEDICINE

## 2018-03-30 PROCEDURE — 83690 ASSAY OF LIPASE: CPT | Performed by: EMERGENCY MEDICINE

## 2018-03-30 PROCEDURE — A9270 NON-COVERED ITEM OR SERVICE: HCPCS | Performed by: FAMILY MEDICINE

## 2018-03-30 PROCEDURE — 63710000001 ACETAMINOPHEN 325 MG TABLET: Performed by: FAMILY MEDICINE

## 2018-03-30 RX ORDER — PANTOPRAZOLE SODIUM 40 MG/10ML
40 INJECTION, POWDER, LYOPHILIZED, FOR SOLUTION INTRAVENOUS
Status: DISCONTINUED | OUTPATIENT
Start: 2018-03-30 | End: 2018-03-31

## 2018-03-30 RX ORDER — TAMSULOSIN HYDROCHLORIDE 0.4 MG/1
0.4 CAPSULE ORAL DAILY
Status: DISCONTINUED | OUTPATIENT
Start: 2018-03-30 | End: 2018-04-01 | Stop reason: HOSPADM

## 2018-03-30 RX ORDER — SUCRALFATE 1 G/1
1 TABLET ORAL
Status: DISCONTINUED | OUTPATIENT
Start: 2018-03-30 | End: 2018-03-31

## 2018-03-30 RX ORDER — PROMETHAZINE HYDROCHLORIDE 25 MG/ML
6.25 INJECTION, SOLUTION INTRAMUSCULAR; INTRAVENOUS ONCE
Status: COMPLETED | OUTPATIENT
Start: 2018-03-30 | End: 2018-03-30

## 2018-03-30 RX ORDER — ACETAMINOPHEN 325 MG/1
650 TABLET ORAL EVERY 6 HOURS PRN
Status: DISCONTINUED | OUTPATIENT
Start: 2018-03-30 | End: 2018-04-01 | Stop reason: HOSPADM

## 2018-03-30 RX ORDER — AMLODIPINE BESYLATE 2.5 MG/1
2.5 TABLET ORAL DAILY
Status: DISCONTINUED | OUTPATIENT
Start: 2018-03-30 | End: 2018-04-01 | Stop reason: HOSPADM

## 2018-03-30 RX ORDER — ONDANSETRON 2 MG/ML
4 INJECTION INTRAMUSCULAR; INTRAVENOUS EVERY 6 HOURS PRN
Status: DISCONTINUED | OUTPATIENT
Start: 2018-03-30 | End: 2018-04-01 | Stop reason: HOSPADM

## 2018-03-30 RX ORDER — PROMETHAZINE HYDROCHLORIDE 25 MG/1
25 SUPPOSITORY RECTAL ONCE AS NEEDED
Status: DISCONTINUED | OUTPATIENT
Start: 2018-03-30 | End: 2018-03-30 | Stop reason: HOSPADM

## 2018-03-30 RX ORDER — LABETALOL HYDROCHLORIDE 5 MG/ML
5 INJECTION, SOLUTION INTRAVENOUS
Status: DISCONTINUED | OUTPATIENT
Start: 2018-03-30 | End: 2018-03-30 | Stop reason: HOSPADM

## 2018-03-30 RX ORDER — ONDANSETRON 2 MG/ML
4 INJECTION INTRAMUSCULAR; INTRAVENOUS ONCE AS NEEDED
Status: DISCONTINUED | OUTPATIENT
Start: 2018-03-30 | End: 2018-03-30 | Stop reason: HOSPADM

## 2018-03-30 RX ORDER — SODIUM CHLORIDE 9 MG/ML
100 INJECTION, SOLUTION INTRAVENOUS CONTINUOUS
Status: ACTIVE | OUTPATIENT
Start: 2018-03-30 | End: 2018-03-31

## 2018-03-30 RX ORDER — PROMETHAZINE HYDROCHLORIDE 25 MG/1
25 TABLET ORAL ONCE AS NEEDED
Status: DISCONTINUED | OUTPATIENT
Start: 2018-03-30 | End: 2018-03-30 | Stop reason: HOSPADM

## 2018-03-30 RX ORDER — IPRATROPIUM BROMIDE AND ALBUTEROL SULFATE 2.5; .5 MG/3ML; MG/3ML
3 SOLUTION RESPIRATORY (INHALATION) ONCE AS NEEDED
Status: DISCONTINUED | OUTPATIENT
Start: 2018-03-30 | End: 2018-03-30 | Stop reason: HOSPADM

## 2018-03-30 RX ORDER — HYDRALAZINE HYDROCHLORIDE 20 MG/ML
5 INJECTION INTRAMUSCULAR; INTRAVENOUS
Status: DISCONTINUED | OUTPATIENT
Start: 2018-03-30 | End: 2018-03-30 | Stop reason: HOSPADM

## 2018-03-30 RX ORDER — ATENOLOL 25 MG/1
25 TABLET ORAL DAILY
Status: DISCONTINUED | OUTPATIENT
Start: 2018-03-30 | End: 2018-04-01 | Stop reason: HOSPADM

## 2018-03-30 RX ORDER — PROMETHAZINE HYDROCHLORIDE 25 MG/ML
6.25 INJECTION, SOLUTION INTRAMUSCULAR; INTRAVENOUS ONCE AS NEEDED
Status: DISCONTINUED | OUTPATIENT
Start: 2018-03-30 | End: 2018-03-30 | Stop reason: HOSPADM

## 2018-03-30 RX ORDER — FINASTERIDE 5 MG/1
5 TABLET, FILM COATED ORAL DAILY
Status: DISCONTINUED | OUTPATIENT
Start: 2018-03-30 | End: 2018-04-01 | Stop reason: HOSPADM

## 2018-03-30 RX ORDER — ATORVASTATIN CALCIUM 10 MG/1
10 TABLET, FILM COATED ORAL EVERY MORNING
Status: DISCONTINUED | OUTPATIENT
Start: 2018-03-31 | End: 2018-04-01 | Stop reason: HOSPADM

## 2018-03-30 RX ORDER — LEVOTHYROXINE SODIUM 175 UG/1
175 TABLET ORAL
Status: DISCONTINUED | OUTPATIENT
Start: 2018-03-31 | End: 2018-04-01 | Stop reason: HOSPADM

## 2018-03-30 RX ORDER — SODIUM CHLORIDE 0.9 % (FLUSH) 0.9 %
1-10 SYRINGE (ML) INJECTION AS NEEDED
Status: DISCONTINUED | OUTPATIENT
Start: 2018-03-30 | End: 2018-04-01 | Stop reason: HOSPADM

## 2018-03-30 RX ADMIN — SODIUM CHLORIDE: 9 INJECTION, SOLUTION INTRAVENOUS at 16:36

## 2018-03-30 RX ADMIN — ATENOLOL 25 MG: 25 TABLET ORAL at 18:56

## 2018-03-30 RX ADMIN — SODIUM CHLORIDE 500 ML: 9 INJECTION, SOLUTION INTRAVENOUS at 06:27

## 2018-03-30 RX ADMIN — SUCRALFATE 1 G: 1 TABLET ORAL at 20:41

## 2018-03-30 RX ADMIN — IOPAMIDOL 80 ML: 612 INJECTION, SOLUTION INTRAVENOUS at 06:50

## 2018-03-30 RX ADMIN — PROPOFOL 150 MCG/KG/MIN: 10 INJECTION, EMULSION INTRAVENOUS at 16:41

## 2018-03-30 RX ADMIN — TAZOBACTAM SODIUM AND PIPERACILLIN SODIUM 3.38 G: 375; 3 INJECTION, SOLUTION INTRAVENOUS at 20:40

## 2018-03-30 RX ADMIN — SODIUM CHLORIDE 100 ML/HR: 9 INJECTION, SOLUTION INTRAVENOUS at 19:01

## 2018-03-30 RX ADMIN — ACETAMINOPHEN 650 MG: 325 TABLET ORAL at 15:13

## 2018-03-30 RX ADMIN — TAMSULOSIN HYDROCHLORIDE 0.4 MG: 0.4 CAPSULE ORAL at 18:56

## 2018-03-30 RX ADMIN — AMLODIPINE BESYLATE 2.5 MG: 2.5 TABLET ORAL at 18:56

## 2018-03-30 RX ADMIN — PROMETHAZINE HYDROCHLORIDE 6.25 MG: 25 INJECTION INTRAMUSCULAR; INTRAVENOUS at 06:29

## 2018-03-30 RX ADMIN — FINASTERIDE 5 MG: 5 TABLET, FILM COATED ORAL at 18:56

## 2018-03-31 PROBLEM — D62 ACUTE BLOOD LOSS ANEMIA: Status: ACTIVE | Noted: 2018-03-31

## 2018-03-31 PROBLEM — K22.11 ULCER OF ESOPHAGUS WITH BLEEDING: Status: ACTIVE | Noted: 2018-03-31

## 2018-03-31 LAB
ANION GAP SERPL CALCULATED.3IONS-SCNC: 5 MMOL/L (ref 3–11)
BILIRUB UR QL STRIP: NEGATIVE
BUN BLD-MCNC: 18 MG/DL (ref 9–23)
BUN/CREAT SERPL: 18 (ref 7–25)
CALCIUM SPEC-SCNC: 7.7 MG/DL (ref 8.7–10.4)
CHLORIDE SERPL-SCNC: 111 MMOL/L (ref 99–109)
CLARITY UR: CLEAR
CO2 SERPL-SCNC: 23 MMOL/L (ref 20–31)
COLOR UR: YELLOW
CREAT BLD-MCNC: 1 MG/DL (ref 0.6–1.3)
DEPRECATED RDW RBC AUTO: 48.9 FL (ref 37–54)
ERYTHROCYTE [DISTWIDTH] IN BLOOD BY AUTOMATED COUNT: 13.7 % (ref 11.3–14.5)
GFR SERPL CREATININE-BSD FRML MDRD: 71 ML/MIN/1.73
GLUCOSE BLD-MCNC: 92 MG/DL (ref 70–100)
GLUCOSE UR STRIP-MCNC: NEGATIVE MG/DL
HCT VFR BLD AUTO: 32 % (ref 38.9–50.9)
HGB BLD-MCNC: 9.9 G/DL (ref 13.1–17.5)
HGB UR QL STRIP.AUTO: NEGATIVE
KETONES UR QL STRIP: NEGATIVE
LEUKOCYTE ESTERASE UR QL STRIP.AUTO: NEGATIVE
MCH RBC QN AUTO: 30.3 PG (ref 27–31)
MCHC RBC AUTO-ENTMCNC: 30.9 G/DL (ref 32–36)
MCV RBC AUTO: 97.9 FL (ref 80–99)
NITRITE UR QL STRIP: NEGATIVE
PH UR STRIP.AUTO: 7 [PH] (ref 5–8)
PLATELET # BLD AUTO: 262 10*3/MM3 (ref 150–450)
PMV BLD AUTO: 9.4 FL (ref 6–12)
POTASSIUM BLD-SCNC: 3.6 MMOL/L (ref 3.5–5.5)
PROT UR QL STRIP: NEGATIVE
RBC # BLD AUTO: 3.27 10*6/MM3 (ref 4.2–5.76)
SODIUM BLD-SCNC: 139 MMOL/L (ref 132–146)
SP GR UR STRIP: 1.03 (ref 1–1.03)
UROBILINOGEN UR QL STRIP: NORMAL
WBC NRBC COR # BLD: 6.87 10*3/MM3 (ref 3.5–10.8)

## 2018-03-31 PROCEDURE — 99233 SBSQ HOSP IP/OBS HIGH 50: CPT | Performed by: FAMILY MEDICINE

## 2018-03-31 PROCEDURE — 85027 COMPLETE CBC AUTOMATED: CPT | Performed by: FAMILY MEDICINE

## 2018-03-31 PROCEDURE — 99232 SBSQ HOSP IP/OBS MODERATE 35: CPT | Performed by: INTERNAL MEDICINE

## 2018-03-31 PROCEDURE — 25010000002 PIPERACILLIN SOD-TAZOBACTAM PER 1 G: Performed by: FAMILY MEDICINE

## 2018-03-31 PROCEDURE — 80048 BASIC METABOLIC PNL TOTAL CA: CPT | Performed by: FAMILY MEDICINE

## 2018-03-31 PROCEDURE — 81003 URINALYSIS AUTO W/O SCOPE: CPT | Performed by: FAMILY MEDICINE

## 2018-03-31 RX ORDER — SUCRALFATE 1 G/1
1 TABLET ORAL
Status: COMPLETED | OUTPATIENT
Start: 2018-03-31 | End: 2018-04-01

## 2018-03-31 RX ORDER — PANTOPRAZOLE SODIUM 40 MG/1
40 TABLET, DELAYED RELEASE ORAL
Status: DISCONTINUED | OUTPATIENT
Start: 2018-03-31 | End: 2018-04-01 | Stop reason: HOSPADM

## 2018-03-31 RX ORDER — AMOXICILLIN AND CLAVULANATE POTASSIUM 875; 125 MG/1; MG/1
1 TABLET, FILM COATED ORAL EVERY 12 HOURS SCHEDULED
Status: DISCONTINUED | OUTPATIENT
Start: 2018-03-31 | End: 2018-04-01 | Stop reason: HOSPADM

## 2018-03-31 RX ORDER — SACCHAROMYCES BOULARDII 250 MG
250 CAPSULE ORAL 2 TIMES DAILY
Status: DISCONTINUED | OUTPATIENT
Start: 2018-03-31 | End: 2018-04-01 | Stop reason: HOSPADM

## 2018-03-31 RX ORDER — SUCRALFATE ORAL 1 G/10ML
1 SUSPENSION ORAL
Status: DISCONTINUED | OUTPATIENT
Start: 2018-03-31 | End: 2018-03-31 | Stop reason: ALTCHOICE

## 2018-03-31 RX ORDER — SUCRALFATE 1 G/1
1 TABLET ORAL
Status: DISCONTINUED | OUTPATIENT
Start: 2018-03-31 | End: 2018-03-31

## 2018-03-31 RX ADMIN — TAMSULOSIN HYDROCHLORIDE 0.4 MG: 0.4 CAPSULE ORAL at 08:09

## 2018-03-31 RX ADMIN — SUCRALFATE 1 G: 1 TABLET ORAL at 13:30

## 2018-03-31 RX ADMIN — FINASTERIDE 5 MG: 5 TABLET, FILM COATED ORAL at 08:09

## 2018-03-31 RX ADMIN — LEVOTHYROXINE SODIUM 175 MCG: 175 TABLET ORAL at 07:01

## 2018-03-31 RX ADMIN — ATENOLOL 25 MG: 25 TABLET ORAL at 08:09

## 2018-03-31 RX ADMIN — TAZOBACTAM SODIUM AND PIPERACILLIN SODIUM 3.38 G: 375; 3 INJECTION, SOLUTION INTRAVENOUS at 00:46

## 2018-03-31 RX ADMIN — SUCRALFATE 1 G: 1 TABLET ORAL at 08:09

## 2018-03-31 RX ADMIN — ATORVASTATIN CALCIUM 10 MG: 10 TABLET, FILM COATED ORAL at 08:09

## 2018-03-31 RX ADMIN — SUCRALFATE 1 G: 1 TABLET ORAL at 21:38

## 2018-03-31 RX ADMIN — Medication 250 MG: at 13:29

## 2018-03-31 RX ADMIN — PANTOPRAZOLE SODIUM 40 MG: 40 TABLET, DELAYED RELEASE ORAL at 18:10

## 2018-03-31 RX ADMIN — TAZOBACTAM SODIUM AND PIPERACILLIN SODIUM 3.38 G: 375; 3 INJECTION, SOLUTION INTRAVENOUS at 08:21

## 2018-03-31 RX ADMIN — AMOXICILLIN AND CLAVULANATE POTASSIUM 1 TABLET: 875; 125 TABLET, FILM COATED ORAL at 21:38

## 2018-03-31 RX ADMIN — AMLODIPINE BESYLATE 2.5 MG: 2.5 TABLET ORAL at 08:09

## 2018-03-31 RX ADMIN — PANTOPRAZOLE SODIUM 40 MG: 40 INJECTION, POWDER, FOR SOLUTION INTRAVENOUS at 08:09

## 2018-03-31 RX ADMIN — SUCRALFATE 1 G: 1 TABLET ORAL at 18:10

## 2018-03-31 RX ADMIN — Medication 250 MG: at 21:38

## 2018-04-01 VITALS
HEIGHT: 67 IN | RESPIRATION RATE: 14 BRPM | BODY MASS INDEX: 25.11 KG/M2 | DIASTOLIC BLOOD PRESSURE: 88 MMHG | HEART RATE: 67 BPM | WEIGHT: 160 LBS | OXYGEN SATURATION: 97 % | SYSTOLIC BLOOD PRESSURE: 146 MMHG | TEMPERATURE: 98.3 F

## 2018-04-01 LAB
HCT VFR BLD AUTO: 34.5 % (ref 38.9–50.9)
HGB BLD-MCNC: 11.1 G/DL (ref 13.1–17.5)

## 2018-04-01 PROCEDURE — 85018 HEMOGLOBIN: CPT | Performed by: FAMILY MEDICINE

## 2018-04-01 PROCEDURE — 99239 HOSP IP/OBS DSCHRG MGMT >30: CPT | Performed by: PHYSICIAN ASSISTANT

## 2018-04-01 PROCEDURE — 85014 HEMATOCRIT: CPT | Performed by: FAMILY MEDICINE

## 2018-04-01 RX ORDER — PANTOPRAZOLE SODIUM 40 MG/1
40 TABLET, DELAYED RELEASE ORAL
Start: 2018-04-01 | End: 2018-06-26 | Stop reason: HOSPADM

## 2018-04-01 RX ORDER — SUCRALFATE 1 G/1
1 TABLET ORAL
Qty: 1 TABLET | Refills: 0 | Status: CANCELLED
Start: 2018-04-01

## 2018-04-01 RX ORDER — SUCRALFATE 1 G/1
1 TABLET ORAL 4 TIMES DAILY
Start: 2018-04-01 | End: 2019-04-23

## 2018-04-01 RX ORDER — TAMSULOSIN HYDROCHLORIDE 0.4 MG/1
0.4 CAPSULE ORAL DAILY
Qty: 30 CAPSULE
Start: 2018-04-01

## 2018-04-01 RX ORDER — ACETAMINOPHEN 325 MG/1
650 TABLET ORAL EVERY 6 HOURS PRN
Start: 2018-04-01

## 2018-04-01 RX ORDER — SACCHAROMYCES BOULARDII 250 MG
250 CAPSULE ORAL 2 TIMES DAILY
Qty: 18 CAPSULE | Refills: 0
Start: 2018-04-01 | End: 2018-04-10

## 2018-04-01 RX ORDER — AMOXICILLIN AND CLAVULANATE POTASSIUM 875; 125 MG/1; MG/1
1 TABLET, FILM COATED ORAL EVERY 12 HOURS SCHEDULED
Qty: 11 TABLET | Refills: 0
Start: 2018-04-01 | End: 2018-04-07

## 2018-04-01 RX ADMIN — PANTOPRAZOLE SODIUM 40 MG: 40 TABLET, DELAYED RELEASE ORAL at 09:00

## 2018-04-01 RX ADMIN — AMLODIPINE BESYLATE 2.5 MG: 2.5 TABLET ORAL at 09:00

## 2018-04-01 RX ADMIN — ATENOLOL 25 MG: 25 TABLET ORAL at 09:00

## 2018-04-01 RX ADMIN — SUCRALFATE 1 G: 1 TABLET ORAL at 09:00

## 2018-04-01 RX ADMIN — TAMSULOSIN HYDROCHLORIDE 0.4 MG: 0.4 CAPSULE ORAL at 09:00

## 2018-04-01 RX ADMIN — Medication 250 MG: at 09:00

## 2018-04-01 RX ADMIN — AMOXICILLIN AND CLAVULANATE POTASSIUM 1 TABLET: 875; 125 TABLET, FILM COATED ORAL at 09:00

## 2018-04-01 RX ADMIN — FINASTERIDE 5 MG: 5 TABLET, FILM COATED ORAL at 09:00

## 2018-04-01 RX ADMIN — ATORVASTATIN CALCIUM 10 MG: 10 TABLET, FILM COATED ORAL at 09:00

## 2018-04-01 NOTE — PLAN OF CARE
Problem: Skin Injury Risk (Adult)  Goal: Identify Related Risk Factors and Signs and Symptoms  Outcome: Ongoing (interventions implemented as appropriate)   04/01/18 0534   Skin Injury Risk (Adult)   Related Risk Factors (Skin Injury Risk) advanced age

## 2018-04-01 NOTE — DISCHARGE PLACEMENT REQUEST
"Rosendo Ariza (87 y.o. Male)     Date of Birth Social Security Number Address Home Phone MRN    07/11/1930  [id number]  [facility] [address] [phone] [id number]    [place] Marital Status          None        Admission Date Admission Type Admitting Provider Attending Provider Department, Room/Bed    3/30/18 Emergency Lyndsay Morrissey MD Hall, Holly, MD [facility] 6A, N617/1    Discharge Date Discharge Disposition Discharge Destination         Rehab Facility or Unit (DC - External)              Attending Provider:  Lyndsay Morrissey MD    Allergies:  No Known Allergies    Isolation:  None   Infection:  None   Code Status:  Conditional    Ht:  170.2 cm (67\")   Wt:  72.6 kg (160 lb)    Admission Cmt:  None   Principal Problem:  Hematemesis [K92.0]                 Active Insurance as of 3/30/2018     Primary Coverage     Payor Plan Insurance Group Employer/Plan Group    MEDICARE MEDICARE A & B      Payor Plan Address Payor Plan Phone Number Effective From Effective To    [address] [phone] 7/1/1995     [place] [address]       Subscriber Name Subscriber Birth Date Member ID       ROSENDO ARIZA 7/11/1930 [id number]           Secondary Coverage     Payor Plan Insurance Group Employer/Plan Group    AARP MED SUPP AAR HEALTH CARE OPTIONS      Payor Plan Address Payor Plan Phone Number Effective From Effective To    [facility] [phone] 1/1/2017     [address]       [place] [address]       Subscriber Name Subscriber Birth Date Member ID       ROSENDO ARIZA 7/11/1930 [id number]                 Emergency Contacts      (Rel.) Home Phone Work Phone Mobile Phone    Nicci Ariza (Daughter) -- -- [phone]        4-1-18 Patient transferring to [facility] today.  Ambulance scheduled for transport.  Clinical faxed and report called.        "

## 2018-04-01 NOTE — DISCHARGE SUMMARY
"    [facility]  DISCHARGE SUMMARY    Patient Name: Dakota Brizuela  : 1930  MRN: [id number]    Date of Admission: 3/30/2018  Date of Discharge:  18  Primary Care Physician: Sam Hernandez MD    Consults     Date and Time Order Name Status Description    3/30/2018 1607 Inpatient Gastroenterology Consult Completed         Hospital Course     Presenting Problem:   Hematemesis, presence of nausea not specified [K92.0]    Active Hospital Problems (** Indicates Principal Problem)    Diagnosis Date Noted   • **Hematemesis [K92.0] 2018   • Acute blood loss anemia due to UGIB [D62] 2018   • Ulcer of esophagus with bleeding [K22.11] 2018   • Thyroid disease [E07.9] 2018   • Enteritis [K52.9] 2018   • Hypertension [I10] 2018   • GERD (gastroesophageal reflux disease) [K21.9] 2018   • Hyperlipidemia [E78.5] 2018   • 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      Hospital Course:  Mr. Dakota Brizuela is an 86yo male with PMH significant for HTN, hyperlipidemia, hypothyroidism and PSP (progressive supranuclear palsy). He was recently admitted to [facility] 3/20-3/24/18 with rhabdomyolysis after a fall. He discharged to [facility] for skilled rehabilitation. He returned to [facility] ED on 3/30/18 for evaluation of coffee ground emesis noted at SNF. The patient did complain of epigastric pain but was unable to characterize the pain or note duration. Per daughter, the patient \"takes ibuprofen like candy\" chronically prior to his recent hospital stay.     In ED, patient had another episode of coffee ground emesis and a brown stool that was occult positive. CT abdomen/pelvis revealed small bowel inflammation and sigmoid colon inflammation. He developed fever of 101.1 after arrival to the floor.  Hospital medicine admitted the " patient and GI was consulted.  He was started on twice daily PPI, empiric Zosyn for enteritis/colitis/fever.  Urinalysis and chest x-ray were unremarkable.    Patient underwent EGD on 3/30/18 by Dr. Brunner.  EGD revealed a superficial ulcer in the lower esophagus with oozing of blood and LA grade B reflux esophagitis.  GI recommended twice-daily PPI and Carafate.  GI suspected that the ulcer was medication related due to its location in the distal esophagus.  IV Zosyn has been transitioned to PO Augmentin and he will complete a total 7 days of therapy.     Mr. Brizuela needs to be seated upright when taking medications and for 30 minutes after medication administration.  He needs a repeat EGD in 8 weeks to document healing.    Hemoglobin has been stable during this hospitalization and Mr. Brizuela will return to [facility] to resume skilled rehabilitation in stable condition on 4/1/18.    Procedure(s):  03/30 1630 UPPER GI ENDOSCOPY     Day of Discharge     HPI:   Sitting up in the chair, getting his meds. Able to answer yes/no questions but most other attempts at speech are garbled. He denied chest pain, dyspnea, nausea/vomiting, difficulties urinating or constipation. Wants to leave today.     Review of Systems  Gen- No fevers, chills  CV- No chest pain, palpitations  Resp- No cough, dyspnea  GI- No N/V/D, abd pain    Otherwise ROS is negative except as mentioned in the HPI.    Vital Signs:   Temp:  [97.6 °F (36.4 °C)-98.5 °F (36.9 °C)] 98.3 °F (36.8 °C)  Heart Rate:  [57-83] 67  Resp:  [12-18] 14  BP: (124-150)/(66-88) 146/88     Physical Exam:  Constitutional: No acute distress, awake, alert and up in chair.   HENT: NCAT, mucous membranes moist  Respiratory: Clear to auscultation bilaterally, respiratory effort normal   Cardiovascular: RRR, no murmurs, rubs, or gallops, palpable pedal pulses bilaterally  Gastrointestinal: Positive bowel sounds, soft, nontender, nondistended  Musculoskeletal: No bilateral ankle  edema  Psychiatric: Appropriate affect, cooperative  Neurologic: Strength symmetric in all extremities, no focal deficits. Answers yes/no questions, speech otherwise garbled.   Skin: No rashes    Pertinent  and/or Most Recent Results       Results from last 7 days  Lab Units 04/01/18  0610 03/31/18  0723 03/30/18  0620   WBC 10*3/mm3  --  6.87 10.49   HEMOGLOBIN g/dL 11.1* 9.9* 11.7*   HEMATOCRIT % 34.5* 32.0* 36.7*   PLATELETS 10*3/mm3  --  262 330   SODIUM mmol/L  --  139 140   POTASSIUM mmol/L  --  3.6 3.9   CHLORIDE mmol/L  --  111* 103   CO2 mmol/L  --  23.0 24.0   BUN mg/dL  --  18 25*   CREATININE mg/dL  --  1.00 0.90   GLUCOSE mg/dL  --  92 160*   CALCIUM mg/dL  --  7.7* 8.8       Results from last 7 days  Lab Units 03/30/18  0620   BILIRUBIN mg/dL 0.5   ALK PHOS U/L 59   ALT (SGPT) U/L 52*   AST (SGOT) U/L 28         Brief Urine Lab Results  (Last result in the past 365 days)      Color   Clarity   Blood   Leuk Est   Nitrite   Protein   CREAT   Urine HCG        03/31/18 0405 Yellow Clear Negative Negative Negative Negative             Microbiology Results Abnormal     None        Imaging Results (all)     Procedure Component Value Units Date/Time    XR Chest 1 View [[id number]] Collected:  03/31/18 1100     Updated:  03/31/18 1547    Narrative:          EXAMINATION: XR CHEST, SINGLE VIEW - 03/30/2018     INDICATION: Cough, fever, recent hospitalization; K92.0-Hematemesis;  R93.3-Abnormal findings on diagnostic imaging of other parts of  digestive tract; 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     COMPARISON: None.     FINDINGS: There is mild cardiomegaly and central vascular congestion.  There is mild volume reduction at the bases.           Impression:       Compared to previous studies of 10 days ago, there is volume  reduction at the lung bases with slight increased mid and lower lung  vascular congestion, however, the findings and changes are subtle.     There is  no consolidative disease or free fluid.     DICTATED:     03/31/2018  EDITED/ls :     03/31/2018      This report was finalized on 3/31/2018 3:45 PM by Dr. Tk Cleveland MD.       CT Abdomen Pelvis With Contrast [[id number]] Collected:  03/30/18 0612     Updated:  03/30/18 0742    Narrative:       EXAM:    CT Abdomen and Pelvis With Intravenous Contrast    CLINICAL HISTORY:    87 years old, male; Signs and symptoms; Nausea and vomiting and other:   Diarrhea; Additional info: Nausea, vomiting, diarrhea    TECHNIQUE:    Axial computed tomography images of the abdomen and pelvis with intravenous   contrast.  All CT scans at this facility use one or more dose reduction   techniques, viz.: automated exposure control; ma/kV adjustment per patient size   (including targeted exams where dose is matched to indication; i.e. head); or   iterative reconstruction technique.    Coronal reformatted images were created and reviewed.    CONTRAST:    80 mL of isouve 300 administered intravenously.    COMPARISON:    CT - AC ABD PELVIS W CONTRAST 2016-05-19 09:26    FINDINGS:    Mild patchy atelectasis at bilateral lung bases.    The liver, gallbladder, spleen, pancreas, adrenal glands, and right kidney are   unremarkable. There is a 3.1 cm cyst in the left kidney upper pole.    Status post resection of the proximal colon. Mild wall thickening of many small   bowel segments in the right side of the abdomen, consistent with inflammatory   change. Possible etiologies include infection and inflammatory bowel disease.   Moderate thickening and hyperenhancement of the wall of the distal sigmoid   colon and rectum. Possible etiologies include infection and inflammatory bowel   disease.    No free intraperitoneal air or fluid identified.    The bladder is unremarkable.    The abdominal aorta is nonaneurysmal.    Mild degenerative changes of the lower thoracic spine and the lumbar spine.      Impression:       1. Mild wall thickening of many  small bowel segments in the right side of the   abdomen, consistent with inflammatory change. Possible etiologies include   infection and inflammatory bowel disease.  2. Moderate thickening and hyperenhancement of the wall of the distal sigmoid   colon and rectum. Possible etiologies include infection and inflammatory bowel   disease.    THIS DOCUMENT HAS BEEN ELECTRONICALLY SIGNED BY CHARMAINE HODGES MD        Discharge Details      Dakota Brizuela   Home Medication Instructions CLIFFORD:[id number]    Printed on:04/01/18 5303   Medication Information                      acetaminophen (TYLENOL) 325 MG tablet  Take 2 tablets by mouth Every 6 (Six) Hours As Needed for Mild Pain .             amLODIPine (NORVASC) 2.5 MG tablet  Take 2.5 mg by mouth Daily.             amoxicillin-clavulanate (AUGMENTIN) 875-125 MG per tablet  Take 1 tablet by mouth Every 12 (Twelve) Hours for 11 doses.             atenolol (TENORMIN) 25 MG tablet  Take 25 mg by mouth Daily.             atorvastatin (LIPITOR) 10 MG tablet  Take 10 mg by mouth Every Morning.             cyanocobalamin 1000 MCG/ML injection  Inject 1,000 mcg under the skin Every 28 (Twenty-Eight) Days. On the 12th of every month.             finasteride (PROSCAR) 5 MG tablet  Take 1 tablet by mouth Daily.             levothyroxine (SYNTHROID, LEVOTHROID) 175 MCG tablet  Take 175 mcg by mouth Daily.             Multiple Vitamin (MULTI-VITAMIN PO)  Take  by mouth Daily.             pantoprazole (PROTONIX) 40 MG EC tablet  Take 1 tablet by mouth 2 (Two) Times a Day Before Meals.             saccharomyces boulardii (FLORASTOR) 250 MG capsule  Take 1 capsule by mouth 2 (Two) Times a Day for 18 doses.             sucralfate (CARAFATE) 1 g tablet  Take 1 tablet by mouth 4 (Four) Times a Day.             tamsulosin (FLOMAX) 0.4 MG capsule 24 hr capsule  Take 1 capsule by mouth Daily.               Discharge Disposition:  Rehab Facility or Unit (DC - External)    Discharge Diet:  Diet  Instructions     Diet: Regular, Soft Texture; Nectar / Syrup Thick Liquids; Whole       Discharge Diet:   Regular  Soft Texture       Fluid Consistency:  Nectar / Syrup Thick Liquids    Soft Options:  Whole        Discharge Activity:   Activity Instructions     Activity as Tolerated           No future appointments.    Additional Instructions for the Follow-ups that You Need to Schedule     Discharge Follow-up with PCP    As directed      Follow Up Details:  PCP follow up one week after d/c from rehab         Discharge Follow-up with Specified Provider: Follow up with [facility] in 4-6 weeks. Needs repeat EGD done in 8 weeks    As directed      To:  Follow up with [facility] in 4-6 weeks. Needs repeat EGD done in 8 weeks             Time Spent on Discharge:  40 minutes    Electronically signed by Latosha Rodrigues PA-C, 04/01/18, 9:45 AM.

## 2018-06-26 ENCOUNTER — OFFICE VISIT (OUTPATIENT)
Dept: GASTROENTEROLOGY | Facility: CLINIC | Age: 83
End: 2018-06-26

## 2018-06-26 VITALS
DIASTOLIC BLOOD PRESSURE: 82 MMHG | OXYGEN SATURATION: 99 % | WEIGHT: 165 LBS | BODY MASS INDEX: 25.9 KG/M2 | TEMPERATURE: 97.5 F | HEIGHT: 67 IN | RESPIRATION RATE: 16 BRPM | HEART RATE: 68 BPM | SYSTOLIC BLOOD PRESSURE: 134 MMHG

## 2018-06-26 DIAGNOSIS — D64.89 ANEMIA DUE TO OTHER CAUSE, NOT CLASSIFIED: Primary | ICD-10-CM

## 2018-06-26 PROCEDURE — 99213 OFFICE O/P EST LOW 20 MIN: CPT | Performed by: INTERNAL MEDICINE

## 2018-06-26 RX ORDER — OMEPRAZOLE 40 MG/1
40 CAPSULE, DELAYED RELEASE ORAL 2 TIMES DAILY
COMMUNITY

## 2018-06-26 NOTE — PROGRESS NOTES
PCP: Sam Hernandez MD    Chief Complaint   Patient presents with   • Follow-up        History of Present Illness:   Dakota Brizuela is a 87 y.o. male who presents to the GI clinic for follow up of hematemesis due to esophageal ulceration. Has a history of chronic neurologic condition (progressive supranuclear palsy /parkinson's type disease) and was taking a significant amount of ibuprofen for knee pain 3/2018. Presented with hematemesis from his skilled nursing facility. My partner, dr. Brunner, performed endoscopy and found a benign appearing ulcer in his esophagus.  He was on ppi prior to egd, 1 x at night. Patient is a difficult historian. Him and his daughter (who does not reside with him) deny any sign of GIB loss. 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     Past Medical History:   Diagnosis Date   • BPH (benign prostatic hyperplasia)    • GERD (gastroesophageal reflux disease)    • Hyperlipidemia    • Hypertension    • Progressive supranuclear ophthalmoplegia    • Rhabdomyolysis    • Thyroid disease        Past Surgical History:   Procedure Laterality Date   • APPENDECTOMY     • ENDOSCOPY N/A 3/30/2018    Procedure: ESOPHAGOGASTRODUODENOSCOPY;  Surgeon: Mark I Brunner, MD;  Location: [facility] ENDOSCOPY;  Service: Gastroenterology   • PROSTATE SURGERY           Current Outpatient Prescriptions:   •  amLODIPine (NORVASC) 2.5 MG tablet, Take 2.5 mg by mouth Daily., Disp: , Rfl:   •  atenolol (TENORMIN) 25 MG tablet, Take 25 mg by mouth Daily., Disp: , Rfl:   •  atorvastatin (LIPITOR) 10 MG tablet, Take 10 mg by mouth Every Morning., Disp: , Rfl:   •  levothyroxine (SYNTHROID, LEVOTHROID) 175 MCG tablet, Take 175 mcg by mouth Daily., Disp: , Rfl:   •  Multiple Vitamin (MULTI-VITAMIN PO), Take  by mouth Daily., Disp: , Rfl:   •  omeprazole (priLOSEC) 40 MG capsule, Take 40 mg by mouth 2 (Two) Times a Day., Disp: , Rfl:   •  sucralfate (CARAFATE) 1 g tablet, Take 1 tablet by mouth 4  (Four) Times a Day., Disp: , Rfl:   •  tamsulosin (FLOMAX) 0.4 MG capsule 24 hr capsule, Take 1 capsule by mouth Daily., Disp: 30 capsule, Rfl:   •  acetaminophen (TYLENOL) 325 MG tablet, Take 2 tablets by mouth Every 6 (Six) Hours As Needed for Mild Pain ., Disp: , Rfl:     Allergies   Allergen Reactions   • Aleve [Naproxen Sodium] Unknown (See Comments)     Unknown       Family History   Problem Relation Age of Onset   • No Known Problems Mother    • No Known Problems Father    • Heart attack Brother        Social History     Social History   • Marital status:      Spouse name: N/A   • Number of children: N/A   • Years of education: N/A     Occupational History   • Not on file.     Social History Main Topics   • Smoking status: Never Smoker   • Smokeless tobacco: Never Used   • Alcohol use Yes      Comment: 1/2 glass red wine    • Drug use: No   • Sexual activity: Defer     Other Topics Concern   • Not on file     Social History Narrative   • No narrative on file       Review of Systems   All other systems reviewed and are negative.    All other systems reviewed and are negative.    Vitals:    06/26/18 1210   BP: 134/82   Pulse: 68   Resp: 16   Temp: 97.5 °F (36.4 °C)   SpO2: 99%       Physical Exam  In wheelchair  Ambulates with difficulty  Bradykinesia and inappropriate laughter  General Appearance:  Vitals as above. no acute distress  Head/face:  Normocephalic, atraumatic  Eyes:   EOMI, no conjunctivitis or icterus   Nose/Sinuses:  Nares patent bilaterally without discharge or lesions  Mouth/Throat:  Posterior pharynx is pink without drainage or exudates;  dentition is in good condition and repair  Neck:  trachea is midline, no thyromegaly  Lungs:  Clear to auscultation bilaterally  Heart:  Regular rate and rhythm without murmur, gallop or rub  Abdomen:  Soft, non-tender to palpation, no obvious masses,   Neurologic:  Alert;   Vascular: extremities without edema  Skin: no rash or  cyanosis.      Assessment/Plan  1.) History of esophageal ulcer and la grade b esophagitis  2.) Chronic neurologic progressive condition  3.) Debility    Discussed risk/benefit of egd. At this time will avoid egd due to family and my preference to avoid the low risk of aspiration pneumonia.  Will continue ppi bid due to ulcer on once a day (with ibuprofen and poor oral hygiene). Wash all pills down and avoid nsaids.  Stop carafate    F/u prn or in 6 months.    Laci Pereira MD  6/26/2018

## 2018-08-07 ENCOUNTER — HOSPITAL ENCOUNTER (OUTPATIENT)
Dept: PHYSICAL THERAPY | Facility: HOSPITAL | Age: 83
Setting detail: THERAPIES SERIES
Discharge: HOME | End: 2018-08-07

## 2018-08-07 ENCOUNTER — TRANSCRIBE ORDERS (OUTPATIENT)
Dept: PHYSICAL THERAPY | Facility: HOSPITAL | Age: 83
End: 2018-08-07

## 2018-08-07 DIAGNOSIS — Z74.09 IMPAIRED FUNCTIONAL MOBILITY, BALANCE, GAIT, AND ENDURANCE: ICD-10-CM

## 2018-08-07 DIAGNOSIS — G23.1 PSP (PROGRESSIVE SUPRANUCLEAR PALSY) (HCC): Primary | ICD-10-CM

## 2018-08-07 PROCEDURE — 97162 PT EVAL MOD COMPLEX 30 MIN: CPT | Performed by: PHYSICAL THERAPIST

## 2018-08-08 PROCEDURE — G8979 MOBILITY GOAL STATUS: HCPCS | Performed by: PHYSICAL THERAPIST

## 2018-08-08 PROCEDURE — G8978 MOBILITY CURRENT STATUS: HCPCS | Performed by: PHYSICAL THERAPIST

## 2018-08-08 NOTE — THERAPY EVALUATION
.Outpatient Physical Therapy Neuro Initial Evaluation   [place]     Patient Name: Dakota Brizuela  : 1930  MRN: [id number]  Today's Date: 2018      Visit Date: 2018    Patient Active Problem List   Diagnosis   • Primary osteoarthritis of right knee   • Hypertension   • GERD (gastroesophageal reflux disease)   • Hyperlipidemia   • PSP (progressive supranuclear palsy) (CMS/HCC)   • Traumatic rhabdomyolysis (CMS/HCC)   • Hyperglycemia   • Leukocytosis   • Low TSH level   • Normocytic anemia   • Acute metabolic encephalopathy   • Elevated troponin   • Hematemesis   • Thyroid disease   • Enteritis   • Acute blood loss anemia due to UGIB   • Ulcer of esophagus with bleeding        Past Medical History:   Diagnosis Date   • BPH (benign prostatic hyperplasia)    • GERD (gastroesophageal reflux disease)    • Hyperlipidemia    • Hypertension    • Progressive supranuclear ophthalmoplegia (CMS/HCC)    • Rhabdomyolysis    • Thyroid disease         Past Surgical History:   Procedure Laterality Date   • APPENDECTOMY     • ENDOSCOPY N/A 3/30/2018    Procedure: ESOPHAGOGASTRODUODENOSCOPY;  Surgeon: Mark I Brunner, MD;  Location: [facility];  Service: Gastroenterology   • PROSTATE SURGERY           Visit Dx:     ICD-10-CM ICD-9-CM   1. PSP (progressive supranuclear palsy) (CMS/HCC) G23.1 333.0   2. Impaired functional mobility, balance, gait, and endurance Z74.09 V49.89             Patient History     Row Name 18 0950             History    Chief Complaint Balance Problems;Difficulty Walking;Difficulty with daily activities;Falls/history of falls;Fatigue/poor endurance;Muscle weakness  -      Date Current Problem(s) Began 01/01/15  -      Brief Description of Current Complaint Initiated with vision issues, specifically double vision. Saw multiple opthamologists. Referred to Dr. Colin, diagnosed with PSP. Pt's daughter states that he is on no medicine for anything related to the PSP, although  he has severe PBA. Pt lives with daughter and son in law. Daughter is available 24/7. She states that pt is can dress himself independently, however they have a caretaker to come and give him a shower 2-3x/wk due to falling in/out of the shower so much. His daughter relates he wakes up 10 x per night due to confusion. During the day he uses his rollator all of the time, however stepinitiation is a big problem. He does have visual issues which have been a causitive factor for his falls. Patient's daughter wishes to improve his gait, transfers and overall QOL while in therapy.   -KL      Patient/Caregiver Goals Improve mobility  -KL      Current Tobacco Use no  -KL         Pain     Pain Location Knee;Shoulder  -KL      Pain at Present 6  -KL      Pain at Best 3  -KL      Pain at Worst 8  -KL         Fall Risk Assessment    Any falls in the past year: Yes  -KL      Number of falls reported in the last 12 months >15   -KL      Factors that contributed to the fall: Lost balance  -      Does patient have a fear of falling Yes (comment)  -         Services    Prior Rehab/Home Health Experiences Yes  -KL         Daily Activities    Primary Language English  -      Recommended Referrals Speech Therapy;Occupational Therapy  -      Pt Participated in POC and Goals Yes  -KL         Safety    Are you being hurt, hit, or frightened by anyone at home or in your life? No  -KL      Are you being neglected by a caregiver No  -KL        User Key  (r) = Recorded By, (t) = Taken By, (c) = Cosigned By    Initials Name Provider Type     Liz Urbina, PT Physical Therapist                    PT Neuro     Row Name 08/07/18 9707             Home Living    Living Arrangements house  -      Home Equipment Rollator;Grab bars   shower chair   -         Vision-Basic Assessment    Current Vision Wears glasses all the time  -      Patient Visual Report --   decreased peripheral vision; visuospatial deficits  -          Cognition    Overall Cognitive Status Impaired  -KL         Sensation    Sensation WNL? WFL  -KL         Coordination    Coordination WNL? --   slowed LE; cannot perform UE   -KL         General ROM    GENERAL ROM COMMENTS AROM is WFL for LE   -KL         MMT (Manual Muscle Testing)    Additional Documentation General Assessment (Manual Muscle Testing) (Group)   could only perform in sitting  -KL         General Assessment (Manual Muscle Testing)    Comment, General Manual Muscle Testing (MMT) Assessment B hip flexion 4/5; B knee extension and flexion 4+/5; B dorsiflexion 5/5; seated hip abd/add B 4-/5  -KL         Bed Mobility Assessment/Treatment    Comment (Bed Mobility) Could not perform in clinic. Pt's daughter relates he does this relatively independently at home   -KL         Transfers    Comment (Transfers) Requires the use of UE's   -KL         Gait/Stairs Assessment/Training    Comment (Gait/Stairs) Wide USHA with severe festination with little forward progression   -KL        User Key  (r) = Recorded By, (t) = Taken By, (c) = Cosigned By    Initials Name Provider Type    Liz Hinton, PT Physical Therapist                  Therapy Education  Education Details: PT POC   Given: Symptoms/condition management  Program: New  How Provided: Verbal  Provided to: Patient, Caregiver  Level of Understanding: Verbalized          PT OP Goals     Row Name 08/07/18 0950          PT Short Term Goals    STG Date to Achieve 09/19/18  -KL     STG 1 Patient and caregiver will report compliance with HEP.   -KL     STG 1 Progress New  -KL     STG 2 Patient to improve LOWERY balance score to >/= 21/56 to decrease client's risk of falls.  -KL     STG 2 Progress New  -KL     STG 3 Patient to perform TUG within 1.5 minutes without LOB for improved functional mobility.  -KL     STG 3 Progress New  -        Long Term Goals    LTG Date to Achieve 10/31/18  -KL     LTG 1 Patient and caregiver will be I with HEP.   -KL     LTG  1 Progress New  -     LTG 2 Patient to improve LOWERY balance score to >/= 23/56 to decrease client's risk of falls.  -     LTG 2 Progress New  -     LTG 3 Patient to perform TUG within 1 minute without LOB for improved functional mobility.  -     LTG 3 Progress New  -        Time Calculation    PT Goal Re-Cert Due Date 11/06/18  -       User Key  (r) = Recorded By, (t) = Taken By, (c) = Cosigned By    Initials Name Provider Type    Liz Hinton, PT Physical Therapist                PT Assessment/Plan     Row Name 08/08/18 0928          PT Assessment    Functional Limitations Decreased safety during functional activities;Impaired gait;Limitation in home management;Limitations in community activities;Performance in self-care ADL;Performance in leisure activities  -     Impairments Balance;Coordination;Gait;Endurance;Poor body mechanics;Posture;Pain;Muscle strength  -     Assessment Comments Patient is 88 YOM that presents with symptoms related to PSP. Patient demonstrates severe festination gait with poor balance, thus placing him in a high fall risk category. Patient is also limited on most ADL. Patient will require skilled PT intervention, as well as OT and ST services, to address functional deficits in order to improve QOL and function.   -     Please refer to paper survey for additional self-reported information Yes  -KL     Rehab Potential Good  -KL     Patient/caregiver participated in establishment of treatment plan and goals Yes  -KL     Patient would benefit from skilled therapy intervention Yes  -KL        PT Plan    PT Frequency 1x/week  -KL     Predicted Duration of Therapy Intervention (Therapy Eval) 12 wks   -KL     Planned CPT's? PT EVAL MOD COMPLELITY: 96732;PT THER PROC EA 15 MIN: 45864;PT MANUAL THERAPY EA 15 MIN: 69860;PT NEUROMUSC RE-EDUCATION EA 15 MIN: 59325;PT GAIT TRAINING EA 15 MIN: 76605;PT HOT OR COLD PACK TREAT MCARE  -     PT Plan Comments Patient will be seen  1x/wk x 12 wks with treatment to include strengthening, stretching, manual therapy, neuromuscular re-education, balance, gait and endurance training.   -GEREMIAS       User Key  (r) = Recorded By, (t) = Taken By, (c) = Cosigned By    Initials Name Provider Type    Liz Hinton, PT Physical Therapist                             Outcome Measure Options: Rojas Balance, Other Outcome Measure  Rojas Balance Scale  Sitting to Standing: able to stand independently using hands  Standing Unsupported: able to stand 2 minutes with supervision  Sitting with Back Unsupported but Feet Supported on Floor or on Stool: able to sit safely and securely for 2 minutes  Standing to Sitting: controls descent by using hands  Transfers: needs one person to assist  Standing Unsupported with Eyes Closed: unable to keep eyes closed 3 seconds but stays safely  Standing Unsupported with Feet Together: needs help to attain position but able to stand 15 seconds feet together  Reaching Forward with Outstretched Arm While Standing: reaches forward but needs supervision   Object From the Floor From a Standing Position: unable to try/needs assist to keep from losing balance or falling  Turning to Look Behind Over Left and Right Shoulders While Standing: turns sideways only but maintains balance  Turn 360 Degrees: needs assistance while turning  Place Alternate Foot on Step or Stool While Standing Unsupported: needs assistance to keep from falling/unable to try  Standing Unsupported with One Foot in Front: loses balance while stepping or standing  Standing on One Leg: unable to try of needs assist to prevent fall  Rojas Total Score: 19       Time Calculation:   Start Time: 0950   Therapy Suggested Charges     Code   Minutes Charges    None           Therapy Charges for Today     Code Description Service Date Service Provider Modifiers Qty    33096269464  PT EVAL MOD COMPLEXITY 4 8/7/2018 Liz Urbina, PT GP 1    [id number]  PT  MOBILITY CURRENT 8/8/2018 Liz Urbina, PT GP, CM 1    [id number] HC PT MOBILITY PROJECTED 8/8/2018 Liz Urbina, PT GP, CL 1          PT G-Codes  PT Professional Judgement Used?: Yes  Outcome Measure Options: Rojas Balance, Other Outcome Measure  Functional Limitation: Mobility: Walking and moving around  Mobility: Walking and Moving Around Current Status (): At least 80 percent but less than 100 percent impaired, limited or restricted  Mobility: Walking and Moving Around Goal Status (): At least 60 percent but less than 80 percent impaired, limited or restricted         Liz Urbina, PT  8/8/2018

## 2018-08-14 ENCOUNTER — HOSPITAL ENCOUNTER (OUTPATIENT)
Dept: SPEECH THERAPY | Facility: HOSPITAL | Age: 83
Setting detail: THERAPIES SERIES
Discharge: HOME | End: 2018-08-14

## 2018-08-14 ENCOUNTER — APPOINTMENT (OUTPATIENT)
Dept: PHYSICAL THERAPY | Facility: HOSPITAL | Age: 83
End: 2018-08-14

## 2018-08-14 ENCOUNTER — TRANSCRIBE ORDERS (OUTPATIENT)
Dept: PHYSICAL THERAPY | Facility: HOSPITAL | Age: 83
End: 2018-08-14

## 2018-08-14 ENCOUNTER — HOSPITAL ENCOUNTER (OUTPATIENT)
Dept: PHYSICAL THERAPY | Facility: HOSPITAL | Age: 83
Setting detail: THERAPIES SERIES
Discharge: HOME | End: 2018-08-14

## 2018-08-14 DIAGNOSIS — R41.89 COGNITIVE DEFICITS: Primary | ICD-10-CM

## 2018-08-14 DIAGNOSIS — Z74.09 IMPAIRED FUNCTIONAL MOBILITY, BALANCE, GAIT, AND ENDURANCE: ICD-10-CM

## 2018-08-14 DIAGNOSIS — R41.841 COGNITIVE COMMUNICATION DEFICIT: ICD-10-CM

## 2018-08-14 DIAGNOSIS — G23.1 PSP (PROGRESSIVE SUPRANUCLEAR PALSY) (HCC): Primary | ICD-10-CM

## 2018-08-14 PROCEDURE — 92523 SPEECH SOUND LANG COMPREHEN: CPT | Performed by: SPEECH-LANGUAGE PATHOLOGIST

## 2018-08-14 PROCEDURE — G9169 MEMORY GOAL STATUS: HCPCS | Performed by: SPEECH-LANGUAGE PATHOLOGIST

## 2018-08-14 PROCEDURE — 97112 NEUROMUSCULAR REEDUCATION: CPT | Performed by: PHYSICAL THERAPIST

## 2018-08-14 PROCEDURE — G9168 MEMORY CURRENT STATUS: HCPCS | Performed by: SPEECH-LANGUAGE PATHOLOGIST

## 2018-08-14 NOTE — THERAPY EVALUATION
Outpatient Speech Language Pathology   Adult Speech Language Cognitive Initial Evaluation   [place]     Patient Name: Dakota Brizuela  : 1930  MRN: [id number]  Today's Date: 2018        Visit Date: 2018   Patient Active Problem List   Diagnosis   • Primary osteoarthritis of right knee   • Hypertension   • GERD (gastroesophageal reflux disease)   • Hyperlipidemia   • PSP (progressive supranuclear palsy) (CMS/HCC)   • Traumatic rhabdomyolysis (CMS/HCC)   • Hyperglycemia   • Leukocytosis   • Low TSH level   • Normocytic anemia   • Acute metabolic encephalopathy   • Elevated troponin   • Hematemesis   • Thyroid disease   • Enteritis   • Acute blood loss anemia due to UGIB   • Ulcer of esophagus with bleeding        Past Medical History:   Diagnosis Date   • BPH (benign prostatic hyperplasia)    • GERD (gastroesophageal reflux disease)    • Hyperlipidemia    • Hypertension    • Progressive supranuclear ophthalmoplegia (CMS/HCC)    • Rhabdomyolysis    • Thyroid disease         Past Surgical History:   Procedure Laterality Date   • APPENDECTOMY     • ENDOSCOPY N/A 3/30/2018    Procedure: ESOPHAGOGASTRODUODENOSCOPY;  Surgeon: Mark I Brunner, MD;  Location: [facility] ENDOSCOPY;  Service: Gastroenterology   • PROSTATE SURGERY           Visit Dx:    ICD-10-CM ICD-9-CM   1. Cognitive deficits R41.89 294.9   2. Cognitive communication deficit R41.841 799.52                 SLP SLC Evaluation - 18 1500        Communication Assessment/Intervention    Document Type evaluation  -    Subjective Information no complaints  -    Patient Observations alert;cooperative   uncontrollable affect (laughing/crying)  -    Patient/Family Observations pt's daughter present for eval. reports patient has diffiuclty managing daily tasks, is very emotionally labile which affects his communication. He has difficulty with safety awareness and recall.  -    Patient Effort excellent  -    Symptoms Noted During/After  Treatment none  -GUDELIA       General Information    Patient Profile Reviewed yes  -GUDELIA    Pertinent History Of Current Problem pt has had previous speech therapy for cognition and swallowing at a rehab center and continued with cognitive therapy with home health once discharged. Pt continues to require PT, OT and ST to improve independence and safety within his environment.  -GUDELIA    Precautions/Limitations, Vision vision impairment, bilaterally   pt had vision issues which affected portion of MOCA  -GUDELIA    Precautions/Limitations, Hearing WFL;for purposes of eval  -GUDELIA    Prior Level of Function-Communication WFL  -GUDELIA    Plans/Goals Discussed with patient;family;agreed upon  -GUDELIA    Barriers to Rehab --   emotional lability  -GUDELIA    Patient's Goals for Discharge functional cognition;functional communication;take care of myself at home  -GUDELIA    Family Goals for Discharge functional cognition;functional communication;patient able to provide self-care with only supervision  -GUDELIA    Standardized Assessment Used [facility]  -GUDELIA       Pain Assessment    Additional Documentation Pain Scale: Numbers Pre/Post-Treatment (Group)  -GDUELIA       Pain Scale: Numbers Pre/Post-Treatment    Pain Scale: Numbers, Pretreatment 0/10 - no pain  -GUDELIA    Pain Scale: Numbers, Post-Treatment 0/10 - no pain  -GUDELIA    Pre/Post Treatment Pain Comment pt did not report any issue with pain  -GUDELIA       Oral Motor Structure and Function    Oral Motor Structure and Function WFL;mild impairment  -GUDELIA    Dentition Assessment natural, present and adequate  -GUDELIA    Mucosal Quality moist, healthy;dry  -GUDELIA       Oral Musculature and Cranial Nerve Assessment    Oral Motor General Assessment generalized oral motor weakness  -GUDELIA    Velar Impairment, Detail, Cranial Nerves X, XI (Vagus and Accessory) CN10/11: Motor Impairment;reduced velar strength  -GUDELIA       Motor Speech Assessment/Intervention    Motor Speech Function mild impairment;WFL  -GUDELIA    Characteristics Consistent with  Dysarthria slow rate;decreased articulation;decreased breath support;hypernasality;slurred speech  -GUDELIA    Phase Completion WFL;mild impairment  -GUDELIA    Conversational Speech (Communication) WFL;mild impairment  -GUDELIA    Motor Speech, Comment pt with adequate intelligibility when lability wasn't overcoming him. ie, laughing or crying.  -GUDELIA       Standardized Tests    Cognitive/Memory Tests MOCA: [place] Cognitive Assessment  -       MOCA: The Get Cognitive Assessment    MOCA Total Score 13/30  -    MOCA Total Score Indicative Of: Mild Cognitive Impairment   results skewed, pt laughing and unable to focus  -GUDELIA    MOCA Comments pt exhibits decreased orientation, decreased short term memory and recall. results were affected by his visual deficits on portion of test as well as his lability which was distracting friom his concentration during portions of testing.   -       SLP Clinical Impressions    SLP Diagnosis mild-mod cognitive deficit characterized by decreased orientation, decreased recall and functional problem solving.   -GUDELIA    Rehab Potential/Prognosis good  -GUDELIA    SLC Criteria for Skilled Therapy Interventions Met yes  -GUDELIA    Functional Impact functional impact in social situations;functional impact in ADLs;needs 24 hour supervision;difficulty communicating in an emergency;difficulty in expressing complex messages;restrictions in personal and social life;decreased ability to respond to situations safely;Poor Judgement;difficulty completing home management task  -GUDELIA       Recommendations    Therapy Frequency (SLP SLC) 1 day per week  -GUDELIA    Predicted Duration Therapy Intervention (Days) --   12weeks  -GUDELIA      User Key  (r) = Recorded By, (t) = Taken By, (c) = Cosigned By    Initials Name Provider Type    Lyric Shah MS CCC-SLP Speech and Language Pathologist                               OP SLP Education     Row Name 08/14/18 4774       Education    Barriers to Learning Visual deficit  -GUDELIA     Action Taken to Address Barriers large print, verbal supplemental cues  -GUDELIA    Education Provided Described results of evaluation;Family/caregivers expressed understanding of evaluation;Patient expressed understanding of evaluation;Patient requires further education on strategies, risks;Family/caregivers require further education on strategies, risks  -GUDELIA    Assessed Learning needs;Learning motivation;Learning preferences;Learning readiness  -GUDELIA    Learning Motivation Strong  -GUDELIA    Learning Method Explanation;Demonstration;Written materials  -GUDELIA    Teaching Response Verbalized understanding;Demonstrated understanding;Reinforcement needed  -GUDELIA    Education Comments ongoing education of strategies and exercises will continue over the course of therapy for carryover of skills to home environment.  -GUDELIA      User Key  (r) = Recorded By, (t) = Taken By, (c) = Cosigned By    Initials Name Effective Dates    GUDELIA Lyric Mills, MS CCC-SLP 08/03/18 -                 SLP OP Goals     Row Name 08/14/18 1500          Goal Type Needed    Goal Type Needed Dysarthria;Memory;Other Adult Goals  -GUDELIA        Subjective Comments    Subjective Comments pt was cooperative and able to participate; he was very participatory and tried to answer questions asked.   -GUDELIA        Subjective Pain    Able to rate subjective pain? yes  -GUDELIA     Pre-Treatment Pain Level 0  -GUEDLIA     Post-Treatment Pain Level 0  -GUDELIA        Dysarthria Goals     Dysarthria LTG's Patient will be able to engage in speech at the conversational level with maximum articulatory accuracy so that speech is understood by familiar /unfamiliar listeners  -GUDELIA     Patient will be able to engage in speech at the conversational level with maximum articulatory accuracy so that speech is understood by familiar /unfamiliar listeners 90%:  -GUDELIA     Status: Patient will be able to engage in speech at the conversational level with maximum articulatory accuracy so that speech is understood by  "familiar /unfamiliar listeners New  -GUDELIA      Dysarthria STG's Patient will improve respiratory support and the use of respiration for speech through use of diaphragmatic breathing and prolonging phonation of a vowel sound;Patient will apply compensatory strategies to improve intelligibility of speech during in spontaneous speech;Patient will increase strength and power  of articulators so they can move more quickly and accurately to improve speech intelligibility by  -GUDELIA     Patient will apply compensatory strategies to improve intelligibility of speech during in spontaneous speech 80%:  -GUDELIA     Status: Patient will apply compensatory strategies to improve intelligibility of speech during in spontaneous speech New  -GUDELIA     \"Patient will increase strength and power  of articulators so they can move more quickly and accurately to improve speech intelligibility by completing lip exercises\" 80%:  -GUDELIA     Status: Patient will increase strength and power  of articulators so they can move more quickly and accurately to improve speech intelligibility by completing lip exercises New  -GUDELIA        Memory Goals    Memory LTG's Patient will be able to remember  information needed to participate in activities of daily living;Patient and family will implement compensatory strategies to maximize patient’s Memory function so patient can continue to participate in daily activities  -GUDELIA     Patient and family will implement compensatory strategies to maximize patient’s Memory function so patient can continue to participate in daily activities 90%:  -GUDELIA     Status: Patient and family will implement compensatory strategies to maximize patient’s Memory function so patient can continue to participate in daily activities New  -GUDELIA     Patient will be able to remember  information needed to participate in activities of daily living 80%  -GUDELIA     Status: Patient will be able to remember  information needed to participate in activities of daily " living New  -GUDELIA     Memory STG's Patient will demonstrate improved ability to recall information by immediately recalling a series of words;Patient’s memory skills will be enhanced as reported by patient by using external memory aides;Patient will demonstrate improved ability to recall information by stating activities patient has completed that day  -GUDELIA     Patient will demonstrate improved ability to recall information by immediately recalling a series of words 90%:;with cues;with intermittent cues  -GUDELIA     Status: Patient will demonstrate improved ability to recall information by immediately recalling a series of words New  -GUDELIA     Patient’s memory skills will be enhanced as reported by patient by using external memory aides 100%:  -GUDELIA     Status: Patient’s memory skills will be enhanced as reported by patient by using external memory aides New  -GUDELIA     Patient will demonstrate improved ability to recall information by stating activities patient has completed that day 90%:;with cues;with intermittent cues  -GUDELIA     Status: Patient will demonstrate improved ability to recall information by stating activities patient has completed that day New  -GUDELIA        Other Goals    Other Adult Goal- 1 Continue/complete testing to determine accurate level of deficits and establish/revise goals if needed.  -GUDELIA     Status: Other Adult Goal- 1 New  -GUDELIA        SLP Time Calculation    SLP Goal Re-Cert Due Date 11/11/18  -GUDELIA       User Key  (r) = Recorded By, (t) = Taken By, (c) = Cosigned By    Initials Name Provider Type    Lyric Shah MS CCC-SLP Speech and Language Pathologist                OP SLP Assessment/Plan - 08/14/18 1530        SLP Assessment    Functional Problems Speech Language- Adult/Cognition  -GUDELIA    Impact on Function: Adult Speech Language/Cognition Difficulty communicating wants, needs, and ideas;Difficulty communicating in a medical emergency;Decreased recall of personal information and medical  history;Requires supervision;Poor judgment  -GUDELIA    Clinical Impression: Speech Language-Adult/Congnition Cognitive Communication WFL;Moderate:;Mild-Moderate:  -GUDELIA    Functional Problems Comment pt is unable to perform functional activities safely due to his cognitive decline as well as physical deficits.  -GUDELIA    Clinical Impression Comments pt presents with mild dysarthria and moderate cognitive deficits including decreased orientation, recall, decreased short term and immediate memory which impact his safety awareness and independence.  -GUDELIA    Please refer to paper survey for additional self-reported information Yes  -GUDELIA    Please refer to items scanned into chart for additional diagnostic informaiton and handouts as provided by clinician Yes  -GUDELIA    SLP Diagnosis cognitive communication deficit;  -GUDELIA    Prognosis Good (comment)  -GUDELIA    Patient/caregiver participated in establishment of treatment plan and goals Yes  -GUDELIA    Patient would benefit from skilled therapy intervention Yes  -GUDELIA       SLP Plan    Frequency 1x weekly  -GUDELIA    Duration 12 weeks  -GUDELIA    Planned CPT's? SLP SPEECH & LANGUAGE EVAL: 80375;SLP INDIVIDUAL SPEECH THERAPY: 64126  -GUDELIA    Expected Duration Therapy Session - minutes 45-60 minutes  -GUDELIA    Plan Comments established plan of care with goals and objectives as stated. continue testing to complete cognitive assessment revising goals if needed.  -GUDELIA      User Key  (r) = Recorded By, (t) = Taken By, (c) = Cosigned By    Initials Name Provider Type    Lyric Shah, MS CCC-SLP Speech and Language Pathologist             SLP Outcome Measures (last 72 hours)      SLP Outcome Measures     Row Name 08/14/18 1500             SLP Outcome Measures    Outcome Measure Used? Adult NOMS  -GUDELIA      Date of Onset/Exacerbation of Primary Medical Diagnosis 3 to < 6 months  -GUDELIA      SLP Diagnoses Cognitive-Communication Disorder  -GUDELIA      Current Treatment Setting Outpatient Rehab  -GUDELIA      Is English the  Primary Language of this Patient? Yes  -GUDELIA         Adult FCM Scores    FCM Chosen Memory  -GUDELIA      Memory FCM Score 4  -GUDELIA      Memory Primary Service Delivery Model FCM Individual  -GUDELIA        User Key  (r) = Recorded By, (t) = Taken By, (c) = Cosigned By    Initials Name Effective Dates    Lyric Shah, MS CCC-SLP 08/03/18 -              Time Calculation:   SLP Start Time: 1500    Therapy Charges for Today     Code Description Service Date Service Provider Modifiers Qty    58256586902 HC ST MEMORY CURRENT 8/14/2018 Lyric Mills MS CCC-SLP GN, CK 1    98605465971 HC ST MEMORY PROJECTED 8/14/2018 Lyric Mills MS CCC-SLP GN, CI 1    62113413989 HC ST EVAL SPEECH AND PROD W LANG  5 8/14/2018 Lyric Mills MS CCC-SLP GN 1          SLP G-Codes  SLP NOMS Used?: Yes  Functional Limitations: Memory  Memory Current Status (): At least 40 percent but less than 60 percent impaired, limited or restricted  Memory Goal Status (): At least 1 percent but less than 20 percent impaired, limited or restricted        Lyric Mills MS CCC-SLP  8/14/2018

## 2018-08-15 ENCOUNTER — APPOINTMENT (OUTPATIENT)
Dept: PHYSICAL THERAPY | Facility: HOSPITAL | Age: 83
End: 2018-08-15

## 2018-08-15 NOTE — THERAPY TREATMENT NOTE
Outpatient Physical Therapy Neuro Treatment Note  [facility]     Patient Name: Dakota Brizuela  : 1930  MRN: [id number]  Today's Date: 8/15/2018      Visit Date: 2018    Visit Dx:    ICD-10-CM ICD-9-CM   1. PSP (progressive supranuclear palsy) (CMS/HCC) G23.1 333.0   2. Impaired functional mobility, balance, gait, and endurance Z74.09 V49.89       Patient Active Problem List   Diagnosis   • Primary osteoarthritis of right knee   • Hypertension   • GERD (gastroesophageal reflux disease)   • Hyperlipidemia   • PSP (progressive supranuclear palsy) (CMS/HCC)   • Traumatic rhabdomyolysis (CMS/HCC)   • Hyperglycemia   • Leukocytosis   • Low TSH level   • Normocytic anemia   • Acute metabolic encephalopathy   • Elevated troponin   • Hematemesis   • Thyroid disease   • Enteritis   • Acute blood loss anemia due to UGIB   • Ulcer of esophagus with bleeding                 PT Neuro     Row Name 18 1345             Subjective Comments    Subjective Comments Patient relates he is tired and had a bad night.   -KL         Subjective Pain    Pre-Treatment Pain Level 5  -KL      Post-Treatment Pain Level 5  -KL      Subjective Pain Comment B knees   -KL         Gait/Stairs Assessment/Training    Comment (Gait/Stairs) In // bars: step initiation training began with forward stepping over HFR x 20 reps plus on each LE; lateral stepping with the same set up, as well as backward stepping - pt required 2 min rest break between each exercises due to increased fatigue. Pt required CGA with minimal tactile and verbal cueing; forward/backward stepping to target on floor x 20 reps each leg   -        User Key  (r) = Recorded By, (t) = Taken By, (c) = Cosigned By    Initials Name Provider Type    Liz Hinton, PT Physical Therapist                        PT Assessment/Plan     Row Name 08/15/18 0899          PT Assessment    Assessment Comments Patient with improved step initiation towards the end of  treatment session post practice and high repetitions. Patient and caregiver instructed on HEP for stepping at home over belt.   -KL        PT Plan    PT Plan Comments Continue per POC.   -KL       User Key  (r) = Recorded By, (t) = Taken By, (c) = Cosigned By    Initials Name Provider Type    Liz Hinton, SHANEKA Physical Therapist                     Exercises     Row Name 08/15/18 0841 08/14/18 1345          Subjective Comments    Subjective Comments  -- Patient relates he is tired and had a bad night.   -KL        Subjective Pain    Pre-Treatment Pain Level  -- 5  -KL     Post-Treatment Pain Level  -- 5  -KL     Subjective Pain Comment  -- B knees   -KL        Total Minutes    59150 -  PT Neuromuscular Reeducation Minutes 55  -KL  --       User Key  (r) = Recorded By, (t) = Taken By, (c) = Cosigned By    Initials Name Provider Type    Liz Hinton, PT Physical Therapist                            Therapy Education  Given: HEP  Program: New  How Provided: Verbal, Demonstration  Provided to: Patient, Caregiver  Level of Understanding: Verbalized, Demonstrated              Time Calculation:   Start Time: 1345  Total Timed Code Minutes- PT: 55 minute(s)   Therapy Suggested Charges     Code   Minutes Charges    29706 (CPT®) Hc Pt Neuromusc Re Education Ea 15 Min 55 4    33446 (CPT®) Hc Pt Ther Proc Ea 15 Min      56797 (CPT®) Hc Gait Training Ea 15 Min      55546 (CPT®) Hc Pt Therapeutic Act Ea 15 Min      10149 (CPT®) Hc Pt Manual Therapy Ea 15 Min      01375 (CPT®) Hc Pt Ther Massage- Per 15 Min      60472 (CPT®) Hc Pt Iontophoresis Ea 15 Min      37977 (CPT®) Hc Pt Elec Stim Ea-Per 15 Min      84716 (CPT®) Hc Pt Ultrasound Ea 15 Min      95717 (CPT®) Hc Pt Self Care/Mgmt/Train Ea 15 Min      55196 (CPT®) Hc Pt Prosthetic (S) Train Initial Encounter, Each 15 Min      12691 (CPT®) Hc Orthotic(S) Mgmt/Train Initial Encounter, Each 15min      93699 (CPT®) Hc Pt Aquatic Therapy Ea 15 Min      43171  (CPT®) Hc Pt Orthotic(S)/Prosthetic(S) Encounter, Each 15 Min      Total  55 4        Therapy Charges for Today     Code Description Service Date Service Provider Modifiers Qty    52866361179 HC PT NEUROMUSC RE EDUCATION EA 15 MIN 8/14/2018 Liz Urbina, PT GP 4                    Liz Urbina, PT  8/15/2018

## 2018-08-20 ENCOUNTER — APPOINTMENT (OUTPATIENT)
Dept: OCCUPATIONAL THERAPY | Facility: HOSPITAL | Age: 83
End: 2018-08-20

## 2018-08-21 ENCOUNTER — HOSPITAL ENCOUNTER (OUTPATIENT)
Dept: SPEECH THERAPY | Facility: HOSPITAL | Age: 83
Setting detail: THERAPIES SERIES
Discharge: HOME | End: 2018-08-21

## 2018-08-21 ENCOUNTER — HOSPITAL ENCOUNTER (OUTPATIENT)
Dept: PHYSICAL THERAPY | Facility: HOSPITAL | Age: 83
Setting detail: THERAPIES SERIES
Discharge: HOME | End: 2018-08-21

## 2018-08-21 DIAGNOSIS — R41.841 COGNITIVE COMMUNICATION DEFICIT: ICD-10-CM

## 2018-08-21 DIAGNOSIS — Z74.09 IMPAIRED FUNCTIONAL MOBILITY, BALANCE, GAIT, AND ENDURANCE: ICD-10-CM

## 2018-08-21 DIAGNOSIS — G23.1 PSP (PROGRESSIVE SUPRANUCLEAR PALSY) (HCC): Primary | ICD-10-CM

## 2018-08-21 DIAGNOSIS — R41.89 COGNITIVE DEFICITS: Primary | ICD-10-CM

## 2018-08-21 PROCEDURE — 97112 NEUROMUSCULAR REEDUCATION: CPT | Performed by: PHYSICAL THERAPIST

## 2018-08-21 PROCEDURE — 97110 THERAPEUTIC EXERCISES: CPT | Performed by: PHYSICAL THERAPIST

## 2018-08-21 PROCEDURE — 92507 TX SP LANG VOICE COMM INDIV: CPT | Performed by: SPEECH-LANGUAGE PATHOLOGIST

## 2018-08-21 NOTE — THERAPY TREATMENT NOTE
Outpatient Speech Language Pathology   Adult Speech Language Cognitive Treatment Note   [facility]     Patient Name: Dakota Brizuela  : 1930  MRN: [id number]  Today's Date: 2018         Visit Date: 2018   Patient Active Problem List   Diagnosis   • Primary osteoarthritis of right knee   • Hypertension   • GERD (gastroesophageal reflux disease)   • Hyperlipidemia   • PSP (progressive supranuclear palsy) (CMS/HCC)   • Traumatic rhabdomyolysis (CMS/HCC)   • Hyperglycemia   • Leukocytosis   • Low TSH level   • Normocytic anemia   • Acute metabolic encephalopathy   • Elevated troponin   • Hematemesis   • Thyroid disease   • Enteritis   • Acute blood loss anemia due to UGIB   • Ulcer of esophagus with bleeding          Visit Dx:    ICD-10-CM ICD-9-CM   1. Cognitive deficits R41.89 294.9   2. Cognitive communication deficit R41.841 799.52                               SLP OP Goals     Row Name 18 1345          Goal Type Needed    Goal Type Needed Other Adult Goals;Memory;Dysarthria  -GUDELIA        Subjective Comments    Subjective Comments pt cooperative for therpay goals   -GUDELIA        Subjective Pain    Able to rate subjective pain? yes  -GUDELIA     Pre-Treatment Pain Level 4  -GUDELIA     Post-Treatment Pain Level 4  -GUDELIA     Subjective Pain Comment pt states his knee is hurting today  -GUDELIA        Dysarthria Goals    Patient will be able to engage in speech at the conversational level with maximum articulatory accuracy so that speech is understood by familiar /unfamiliar listeners 90%:  -GUDELIA     Status: Patient will be able to engage in speech at the conversational level with maximum articulatory accuracy so that speech is understood by familiar /unfamiliar listeners Progressing as expected  -GUDELIA     Comments: Patient will be able to engage in speech at the conversational level with maximum articulatory accuracy so that speech is understood by familiar /unfamiliar listeners initiated goal, gave patient strategies  for overarticulation and increasing breath support for increased intelligibility.  -GUDELIA     Patient will apply compensatory strategies to improve intelligibility of speech during in spontaneous speech 80%:  -GUDELIA     Status: Patient will apply compensatory strategies to improve intelligibility of speech during in spontaneous speech Progressing as expected  -GUDELIA     Comments: Patient will apply compensatory strategies to improve intelligibility of speech during in spontaneous speech gave strategies; patient demonstrated good use in structured tasks  -GUDELIA        Memory Goals    Memory LTG's Patient will be able to remember  information needed to participate in activities of daily living;Patient and family will implement compensatory strategies to maximize patient’s Memory function so patient can continue to participate in daily activities  -GUDELIA     Patient and family will implement compensatory strategies to maximize patient’s Memory function so patient can continue to participate in daily activities 90%:  -GUDELIA     Status: Patient and family will implement compensatory strategies to maximize patient’s Memory function so patient can continue to participate in daily activities Progressing as expected  -GUDELIA     Patient will be able to remember  information needed to participate in activities of daily living 80%  -GUDELIA     Status: Patient will be able to remember  information needed to participate in activities of daily living Progressing as expected  -GUDELIA     Comments: Patient will be able to remember  information needed to participate in activities of daily living initiated some strategies for recall and shared with pt daughter  -GUDELIA     Patient will demonstrate improved ability to recall information by immediately recalling a series of words 90%:;with cues;with intermittent cues  -GUDELIA     Status: Patient will demonstrate improved ability to recall information by immediately recalling a series of words Progressing as expected  -GUDELIA      Comments: Patient will demonstrate improved ability to recall information by immediately recalling a series of words 3 words immediate recall 5/7; 4 words 2/4;   -GUDELIA     Patient’s memory skills will be enhanced as reported by patient by using external memory aides 100%:  -GUDELIA     Status: Patient’s memory skills will be enhanced as reported by patient by using external memory aides New  -GUDELIA     Comments: Patient’s memory skills will be enhanced as reported by patient by using external memory aides 8/21/2018: not addressed today   -GUDELIA     Patient will demonstrate improved ability to recall information by stating activities patient has completed that day 80%:  -GUDELIA     Status: Patient will demonstrate improved ability to recall information by stating activities patient has completed that day Progressing as expected  -GUDELIA     Comments: Patient will demonstrate improved ability to recall information by stating activities patient has completed that day 8/21/2018: recalling 3 events of the day with 90% min cues.  -GUDELIA        Other Goals    Status: Other Adult Goal- 1 Achieved  -GUDELIA     Comments: Other Adult Goal- 1 8/21/2018: completed testing. addressed results and deficits with patient.   -GUDELIA     Other Adult Goal- 2 Pt will answer questions after reading a 36-50 word paragraph with 80% accuracy  -GUDELIA     Status: Other Adult Goal- 2 New  -GUDELIA        SLP Time Calculation    SLP Goal Re-Cert Due Date 11/11/18  -GUDELIA       User Key  (r) = Recorded By, (t) = Taken By, (c) = Cosigned By    Initials Name Provider Type    Lyric Shah MS CCC-SLP Speech and Language Pathologist                OP SLP Education     Row Name 08/21/18 9753       Education    Barriers to Learning Other (comment0  -GUDELIA    Learning Motivation Strong  -GUDELIA    Learning Method Explanation;Demonstration;Teach back;Written materials  -GUDELIA    Teaching Response Verbalized understanding;Demonstrated understanding;Reinforcement needed  -GUDELIA    Education Comments  gave folder with handouts today for breathing technique and strategies for increased intelligibility.  -GUDELIA      User Key  (r) = Recorded By, (t) = Taken By, (c) = Cosigned By    Initials Name Effective Dates    Lyric Shah MS CCC-SLP 08/03/18 -                 OP SLP Assessment/Plan - 08/21/18 1445        SLP Assessment    Functional Problems Speech Language- Adult/Cognition  -GUDELIA    Impact on Function: Adult Speech Language/Cognition Difficulty communicating wants, needs, and ideas;Restrictions in personal and social life;Difficulty participating in avocational activities;Difficulty communicating in a medical emergency;Difficulty sequencing thoughts to express complex messages  -GUDELIA    Clinical Impression: Speech Language-Adult/Congnition Mild-Moderate:;Cognitive Communication Impairment  -GUDELIA    Functional Problems Comment pt exhibits uncontrolled laughing which interferes with his ability to communicate at times.   -GUDELIA    Prognosis Good (comment)  -GUDELIA    Patient/caregiver participated in establishment of treatment plan and goals Yes  -GUDELIA    Patient would benefit from skilled therapy intervention Yes  -GUDELIA       SLP Plan    Planned CPT's? SLP INDIVIDUAL SPEECH THERAPY: 62827  -GUDELIA    Expected Duration Therapy Session - minutes 45-60 minutes  -GUDELIA    Plan Comments continue plan of care with the addition of added goal today   -GUDELIA      User Key  (r) = Recorded By, (t) = Taken By, (c) = Cosigned By    Initials Name Provider Type    Lyric Shah MS CCC-SLP Speech and Language Pathologist                 Time Calculation:   SLP Start Time: 1445  SLP Stop Time: 1545  SLP Time Calculation (min): 60 min    Therapy Charges for Today     Code Description Service Date Service Provider Modifiers Qty    77569337814  ST TREATMENT SPEECH 4 8/21/2018 Lyric Mills MS CCC-SLP GN 1                   Lyric Mills MS CCC-SLP  8/21/2018

## 2018-08-22 NOTE — THERAPY TREATMENT NOTE
"    Outpatient Physical Therapy Neuro Treatment Note  [facility]     Patient Name: Dakota Brizuela  : 1930  MRN: [id number]  Today's Date: 2018      Visit Date: 2018    Visit Dx:    ICD-10-CM ICD-9-CM   1. PSP (progressive supranuclear palsy) (CMS/HCC) G23.1 333.0   2. Impaired functional mobility, balance, gait, and endurance Z74.09 V49.89       Patient Active Problem List   Diagnosis   • Primary osteoarthritis of right knee   • Hypertension   • GERD (gastroesophageal reflux disease)   • Hyperlipidemia   • PSP (progressive supranuclear palsy) (CMS/HCC)   • Traumatic rhabdomyolysis (CMS/HCC)   • Hyperglycemia   • Leukocytosis   • Low TSH level   • Normocytic anemia   • Acute metabolic encephalopathy   • Elevated troponin   • Hematemesis   • Thyroid disease   • Enteritis   • Acute blood loss anemia due to UGIB   • Ulcer of esophagus with bleeding                 PT Neuro     Row Name 18 1345             Subjective Comments    Subjective Comments Patient states that his daughter did not understand his HEP.   -KL         Subjective Pain    Pre-Treatment Pain Level 3  -KL      Post-Treatment Pain Level 5  -KL      Subjective Pain Comment R knee   -KL         Transfers    Comment (Transfers) sit to stands from mat table with instruction to place feet together and push from surface he is sitting on.   -KL         Gait/Stairs Assessment/Training    Comment (Gait/Stairs) > 10 min spent ambulating in clinic to move from door to equipment due to slowed and shuffling pattern. He was cued and instructed to attempt to attain a narrower base of support   -KL         Balance Skills Training    Training Strategies (Balance) In // bars: review of HEP with addition of \"boxing\" patient in to attain a narrow USHA - forward and lateral stepping over belt; daughter given written instruction to try and purchase a laser to attach to walker in order to provide a visual cue for stepping.   -KL        User Key  (r) = " Recorded By, (t) = Taken By, (c) = Cosigned By    Initials Name Provider Type    Liz Hinton PT Physical Therapist                        PT Assessment/Plan     Row Name 08/22/18 0847          PT Assessment    Assessment Comments Patient with improved stepping in clinic when B feet are in a more narrow USHA. Pt's daughter was asked to obtain a laser for gait training to attach to rollator. He will continue to be progressed as indicated.   -KL        PT Plan    PT Plan Comments Try laser attached to rollator or music to assist with stepping strategy.   -KL       User Key  (r) = Recorded By, (t) = Taken By, (c) = Cosigned By    Initials Name Provider Type    Liz Hinton PT Physical Therapist                     Exercises     Row Name 08/22/18 0850 08/21/18 1345          Subjective Comments    Subjective Comments  -- Patient states that his daughter did not understand his HEP.   -KL        Subjective Pain    Pre-Treatment Pain Level  -- 3  -KL     Post-Treatment Pain Level  -- 5  -KL     Subjective Pain Comment  -- R knee   -KL        Total Minutes    10899 - PT Therapeutic Exercise Minutes 20  -KL  --     28730 -  PT Neuromuscular Reeducation Minutes 35  -KL  --        Exercise 1    Exercise Name 1  -- Nu-Step L4  -KL     Time 1  -- 5 min   -KL       User Key  (r) = Recorded By, (t) = Taken By, (c) = Cosigned By    Initials Name Provider Type    Liz Hitnon PT Physical Therapist                            Therapy Education  Given: HEP  Program: Reinforced  How Provided: Verbal, Demonstration, Written  Provided to: Caregiver, Patient  Level of Understanding: Verbalized, Demonstrated              Time Calculation:   Start Time: 1345  Total Timed Code Minutes- PT: 55 minute(s)   Therapy Suggested Charges     Code   Minutes Charges    11489 (CPT®) Hc Pt Neuromusc Re Education Ea 15 Min 35 3    50370 (CPT®) Hc Pt Ther Proc Ea 15 Min 20 1    62344 (CPT®) Hc Gait Training Ea 15 Min       10045 (CPT®) Hc Pt Therapeutic Act Ea 15 Min      57738 (CPT®) Hc Pt Manual Therapy Ea 15 Min      90752 (CPT®) Hc Pt Ther Massage- Per 15 Min      74683 (CPT®) Hc Pt Iontophoresis Ea 15 Min      65997 (CPT®) Hc Pt Elec Stim Ea-Per 15 Min      58623 (CPT®) Hc Pt Ultrasound Ea 15 Min      62455 (CPT®) Hc Pt Self Care/Mgmt/Train Ea 15 Min      11426 (CPT®) Hc Pt Prosthetic (S) Train Initial Encounter, Each 15 Min      38928 (CPT®) Hc Orthotic(S) Mgmt/Train Initial Encounter, Each 15min      61504 (CPT®) Hc Pt Aquatic Therapy Ea 15 Min      07937 (CPT®) Hc Pt Orthotic(S)/Prosthetic(S) Encounter, Each 15 Min      Total  55 4        Therapy Charges for Today     Code Description Service Date Service Provider Modifiers Qty    37818831131 HC PT NEUROMUSC RE EDUCATION EA 15 MIN 8/21/2018 Liz Urbina, PT GP 3    30735917278 HC PT THER PROC EA 15 MIN 8/21/2018 Liz Urbina, PT GP 1                    Liz Urbina, PT  8/22/2018

## 2018-08-23 ENCOUNTER — APPOINTMENT (OUTPATIENT)
Dept: OCCUPATIONAL THERAPY | Facility: HOSPITAL | Age: 83
End: 2018-08-23

## 2018-08-28 ENCOUNTER — HOSPITAL ENCOUNTER (OUTPATIENT)
Dept: SPEECH THERAPY | Facility: HOSPITAL | Age: 83
Setting detail: THERAPIES SERIES
Discharge: HOME | End: 2018-08-28

## 2018-08-28 DIAGNOSIS — R41.841 COGNITIVE COMMUNICATION DEFICIT: ICD-10-CM

## 2018-08-28 DIAGNOSIS — R41.89 COGNITIVE DEFICITS: Primary | ICD-10-CM

## 2018-08-28 PROCEDURE — 92507 TX SP LANG VOICE COMM INDIV: CPT | Performed by: SPEECH-LANGUAGE PATHOLOGIST

## 2018-08-28 NOTE — THERAPY TREATMENT NOTE
Outpatient Speech Language Pathology   Adult Speech Language Cognitive Treatment Note   Carlos     Patient Name: Dakota Brizuela  : 1930  MRN: [id number]  Today's Date: 2018         Visit Date: 2018   Patient Active Problem List   Diagnosis   • Primary osteoarthritis of right knee   • Hypertension   • GERD (gastroesophageal reflux disease)   • Hyperlipidemia   • PSP (progressive supranuclear palsy) (CMS/HCC)   • Traumatic rhabdomyolysis (CMS/HCC)   • Hyperglycemia   • Leukocytosis   • Low TSH level   • Normocytic anemia   • Acute metabolic encephalopathy   • Elevated troponin   • Hematemesis   • Thyroid disease   • Enteritis   • Acute blood loss anemia due to UGIB   • Ulcer of esophagus with bleeding          Visit Dx:    ICD-10-CM ICD-9-CM   1. Cognitive deficits R41.89 294.9   2. Cognitive communication deficit R41.841 799.52                               SLP OP Goals     Row Name 18 1500          Goal Type Needed    Goal Type Needed Other Adult Goals  -GUDELIA        Subjective Comments    Subjective Comments pt cooperative and reports practicing his home work activity.  -GUDELIA        Subjective Pain    Able to rate subjective pain? yes  -GUDELIA     Pre-Treatment Pain Level 0  -GUDELIA     Post-Treatment Pain Level 0  -GUDELIA        Dysarthria Goals    Patient will be able to engage in speech at the conversational level with maximum articulatory accuracy so that speech is understood by familiar /unfamiliar listeners 90%:  -GUDELIA     Status: Patient will be able to engage in speech at the conversational level with maximum articulatory accuracy so that speech is understood by familiar /unfamiliar listeners Progressing as expected  -GUDELIA     Comments: Patient will be able to engage in speech at the conversational level with maximum articulatory accuracy so that speech is understood by familiar /unfamiliar listeners initiated goal, gave patient strategies for overarticulation and increasing breath support for  "increased intelligibility.  -GUDELIA     Patient will apply compensatory strategies to improve intelligibility of speech during in spontaneous speech 80%:  -GUDELIA     Status: Patient will apply compensatory strategies to improve intelligibility of speech during in spontaneous speech Progressing as expected  -GUDELIA     Comments: Patient will apply compensatory strategies to improve intelligibility of speech during in spontaneous speech gave strategies; patient demonstrated good use in structured tasks  -GUDELIA     \"Patient will increase strength and power  of articulators so they can move more quickly and accurately to improve speech intelligibility by completing lip exercises\" 80%:  -GUDELIA     Status: Patient will increase strength and power  of articulators so they can move more quickly and accurately to improve speech intelligibility by completing lip exercises Progressing as expected  -GUDELIA     Comments: Patient will increase strength and power  of articulators so they can move more quickly and accurately to improve speech intelligibility by completing lip exercises 8/28/2018: multisyllabic word drills using intelligibility   -        Memory Goals    Memory LTG's Patient will be able to remember  information needed to participate in activities of daily living;Patient and family will implement compensatory strategies to maximize patient’s Memory function so patient can continue to participate in daily activities  -GUDELIA     Patient and family will implement compensatory strategies to maximize patient’s Memory function so patient can continue to participate in daily activities 90%:  -GUDELIA     Status: Patient and family will implement compensatory strategies to maximize patient’s Memory function so patient can continue to participate in daily activities Progressing as expected  -GUDELIA     Comments: Patient and family will implement compensatory strategies to maximize patient’s Memory function so patient can continue to participate in daily " activities 8/28/2018:   -GUDELIA     Patient will be able to remember  information needed to participate in activities of daily living 80%  -GUDELIA     Status: Patient will be able to remember  information needed to participate in activities of daily living Progressing as expected  -GUDELIA     Comments: Patient will be able to remember  information needed to participate in activities of daily living initiated some strategies for recall and shared with pt daughter  -GUDELIA     Memory STG's Patient will demonstrate improved ability to recall information by immediately recalling a series of words;Patient’s memory skills will be enhanced as reported by patient by using external memory aides;Patient will demonstrate improved ability to recall information by stating activities patient has completed that day  -GUDELIA     Patient will demonstrate improved ability to recall information by immediately recalling a series of words 90%:;with cues;with intermittent cues  -GUDELIA     Status: Patient will demonstrate improved ability to recall information by immediately recalling a series of words Progressing as expected  -GUDELIA     Comments: Patient will demonstrate improved ability to recall information by immediately recalling a series of words 8/28/2018: immediate recall of 3 words 100% unrelated words; 4 words related-5/6; unrelated difficult, 8/21/2018:3 words immediate recall 5/7; 4 words 2/4;   -GUDELIA     Patient’s memory skills will be enhanced as reported by patient by using external memory aides 100%:  -GUDELIA     Status: Patient’s memory skills will be enhanced as reported by patient by using external memory aides New  -GUDELIA     Comments: Patient’s memory skills will be enhanced as reported by patient by using external memory aides 8/28/2018: external memory aids introduced including rehearsal and visual cues. improving memory of words by 25%.8/21/2018: not addressed today   -GUDELIA     Patient will demonstrate improved ability to recall information by stating  activities patient has completed that day 80%:  -UGDELIA     Status: Patient will demonstrate improved ability to recall information by stating activities patient has completed that day Progressing as expected  -GUDELIA     Comments: Patient will demonstrate improved ability to recall information by stating activities patient has completed that day 8/28/2018: recalling 3 events of the day with  cues required 2/3 and 3/3 with more prompts needed. 8/21/2018: recalling 3 events of the day with 90% min cues.  -GUDELIA        Other Goals    Other Adult Goal- 1 Continue/complete testing to determine accurate level of deficits and establish/revise goals if needed.  -GUDELIA     Status: Other Adult Goal- 1 Achieved  -GUDELIA     Comments: Other Adult Goal- 1 8/21/2018: completed testing. addressed results and deficits with patient.   -GUDELIA     Other Adult Goal- 2 Pt will answer questions after reading a 36-50 word paragraph with 80% accuracy  -GUDELIA     Status: Other Adult Goal- 2 Progressing as expected  -GUDELIA     Comments: Other Adult Goal- 2 8/28/2018: 3/5 correct; 4/5;min cues needed  -GUDELIA     Other Adult Goal- 3 Pt will complete temporal and multifactoral problem solving tasks with cues as needed to perform correctly 80% of the time.   -GUDELIA     Status: Other Adult Goal- 3 New  -GUDELIA     Comments: Other Adult Goal- 3 8/28/2018: introduced new goal today.  -GUDELIA        SLP Time Calculation    SLP Goal Re-Cert Due Date 11/11/18  -GUDELIA       User Key  (r) = Recorded By, (t) = Taken By, (c) = Cosigned By    Initials Name Provider Type    Lyric Shah MS CCC-SLP Speech and Language Pathologist                OP SLP Education     Row Name 08/28/18 0157       Education    Barriers to Learning Other (comment0  -GUDELIA    Action Taken to Address Barriers uncontrolled laughter interferes with pt speaking.   -GUDELIA    Education Provided Patient requires further education on strategies, risks  -GUDELIA    Assessed Learning needs;Learning motivation;Learning  preferences;Learning readiness  -GUDELIA    Learning Motivation Moderate;Strong  -GUDELIA    Learning Method Explanation;Demonstration;Written materials  -GUDELIA    Teaching Response Verbalized understanding;Reinforcement needed  -GUDELIA    Education Comments ongoing  -GUDELIA      User Key  (r) = Recorded By, (t) = Taken By, (c) = Cosigned By    Initials Name Effective Dates    Lyric Shah MS CCC-SLP 08/03/18 -                 OP SLP Assessment/Plan - 08/28/18 1534        SLP Assessment    Functional Problems Speech Language- Adult/Cognition  -GUDELIA    Impact on Function: Adult Speech Language/Cognition Difficulty communicating wants, needs, and ideas;Difficulty sequencing thoughts to express complex messages;Restrictions in personal and social life;Difficulty participating in avocational activities;Difficulty communicating in a medical emergency  -GUDELIA    Clinical Impression: Speech Language-Adult/Congnition Mild-Moderate:;Cognitive Communication Impairment  -GUDELIA    Please refer to paper survey for additional self-reported information Yes  -GUDELIA    Please refer to items scanned into chart for additional diagnostic informaiton and handouts as provided by clinician Yes  -GUDELIA    Prognosis Good (comment)  -GUDELIA    Patient/caregiver participated in establishment of treatment plan and goals Yes  -GUDELIA    Patient would benefit from skilled therapy intervention Yes  -GUDELIA       SLP Plan    Planned CPT's? SLP INDIVIDUAL SPEECH THERAPY: 76937  -GUDELIA    Expected Duration Therapy Session - minutes 45-60 minutes  -GUDELIA    Plan Comments continue plan of care as stated.  -GUDELIA      User Key  (r) = Recorded By, (t) = Taken By, (c) = Cosigned By    Initials Name Provider Type    Lyric Shah MS CCC-SLP Speech and Language Pathologist                 Time Calculation:   SLP Start Time: 1410  Total Timed Code Minutes- SLP: 50 minute(s)    Therapy Charges for Today     Code Description Service Date Service Provider Modifiers Qty    51546224130 [facility] TREATMENT  SPEECH 4 8/28/2018 Lyric Mills, MS CCC-SLP GN 1                   Lyric Mills, MS CCC-SLP  8/28/2018

## 2018-08-29 ENCOUNTER — APPOINTMENT (OUTPATIENT)
Dept: PHYSICAL THERAPY | Facility: HOSPITAL | Age: 83
End: 2018-08-29

## 2018-08-29 ENCOUNTER — TRANSCRIBE ORDERS (OUTPATIENT)
Dept: PHYSICAL THERAPY | Facility: HOSPITAL | Age: 83
End: 2018-08-29

## 2018-08-29 DIAGNOSIS — G23.1 PSP (PROGRESSIVE SUPRANUCLEAR PALSY) (HCC): Primary | ICD-10-CM

## 2018-08-30 ENCOUNTER — HOSPITAL ENCOUNTER (OUTPATIENT)
Dept: OCCUPATIONAL THERAPY | Facility: HOSPITAL | Age: 83
Setting detail: THERAPIES SERIES
Discharge: HOME | End: 2018-08-30

## 2018-08-30 DIAGNOSIS — R27.8 DECREASED COORDINATION: ICD-10-CM

## 2018-08-30 DIAGNOSIS — Z78.9 DECREASED INDEPENDENCE WITH ACTIVITIES OF DAILY LIVING: Primary | ICD-10-CM

## 2018-08-30 DIAGNOSIS — Z74.09 DECREASED FUNCTIONAL MOBILITY AND ENDURANCE: ICD-10-CM

## 2018-08-30 PROCEDURE — G8987 SELF CARE CURRENT STATUS: HCPCS | Performed by: OCCUPATIONAL THERAPIST

## 2018-08-30 PROCEDURE — 97167 OT EVAL HIGH COMPLEX 60 MIN: CPT | Performed by: OCCUPATIONAL THERAPIST

## 2018-08-30 PROCEDURE — G8988 SELF CARE GOAL STATUS: HCPCS | Performed by: OCCUPATIONAL THERAPIST

## 2018-09-05 ENCOUNTER — HOSPITAL ENCOUNTER (OUTPATIENT)
Dept: SPEECH THERAPY | Facility: HOSPITAL | Age: 83
Setting detail: THERAPIES SERIES
Discharge: HOME | End: 2018-09-05

## 2018-09-05 ENCOUNTER — HOSPITAL ENCOUNTER (OUTPATIENT)
Dept: PHYSICAL THERAPY | Facility: HOSPITAL | Age: 83
Setting detail: THERAPIES SERIES
Discharge: HOME | End: 2018-09-05

## 2018-09-05 DIAGNOSIS — G23.1 PSP (PROGRESSIVE SUPRANUCLEAR PALSY) (HCC): Primary | ICD-10-CM

## 2018-09-05 DIAGNOSIS — R41.841 COGNITIVE COMMUNICATION DEFICIT: ICD-10-CM

## 2018-09-05 DIAGNOSIS — R41.89 COGNITIVE DEFICITS: Primary | ICD-10-CM

## 2018-09-05 DIAGNOSIS — Z74.09 IMPAIRED FUNCTIONAL MOBILITY, BALANCE, GAIT, AND ENDURANCE: ICD-10-CM

## 2018-09-05 PROCEDURE — 92507 TX SP LANG VOICE COMM INDIV: CPT | Performed by: SPEECH-LANGUAGE PATHOLOGIST

## 2018-09-05 PROCEDURE — 97112 NEUROMUSCULAR REEDUCATION: CPT | Performed by: PHYSICAL THERAPIST

## 2018-09-05 PROCEDURE — 97110 THERAPEUTIC EXERCISES: CPT | Performed by: PHYSICAL THERAPIST

## 2018-09-05 NOTE — THERAPY TREATMENT NOTE
Outpatient Speech Language Pathology   Adult Speech Language Cognitive Treatment Note   [facility]     Patient Name: Dakota Brizuela  : 1930  MRN: [id number]  Today's Date: 2018         Visit Date: 2018   Patient Active Problem List   Diagnosis   • Primary osteoarthritis of right knee   • Hypertension   • GERD (gastroesophageal reflux disease)   • Hyperlipidemia   • PSP (progressive supranuclear palsy) (CMS/HCC)   • Traumatic rhabdomyolysis (CMS/HCC)   • Hyperglycemia   • Leukocytosis   • Low TSH level   • Normocytic anemia   • Acute metabolic encephalopathy   • Elevated troponin   • Hematemesis   • Thyroid disease   • Enteritis   • Acute blood loss anemia due to UGIB   • Ulcer of esophagus with bleeding          Visit Dx:    ICD-10-CM ICD-9-CM   1. Cognitive deficits R41.89 294.9   2. Cognitive communication deficit R41.841 799.52                               SLP OP Goals     Row Name 18 1400          Goal Type Needed    Goal Type Needed Other Adult Goals  -GUDELIA        Subjective Comments    Subjective Comments pt is more calm today with less emotional stability overall.   -GUDELIA        Subjective Pain    Able to rate subjective pain? yes  -GUDELIA     Pre-Treatment Pain Level 0  -GUDELIA     Post-Treatment Pain Level 0  -GUDELIA        Dysarthria Goals    Patient will be able to engage in speech at the conversational level with maximum articulatory accuracy so that speech is understood by familiar /unfamiliar listeners 90%:  -GUDELIA     Status: Patient will be able to engage in speech at the conversational level with maximum articulatory accuracy so that speech is understood by familiar /unfamiliar listeners Progressing as expected  -GUDELIA     Comments: Patient will be able to engage in speech at the conversational level with maximum articulatory accuracy so that speech is understood by familiar /unfamiliar listeners initiated goal, gave patient strategies for overarticulation and increasing breath support for  "increased intelligibility.  -GUDELIA     Patient will apply compensatory strategies to improve intelligibility of speech during in spontaneous speech 80%:  -GUDELIA     Status: Patient will apply compensatory strategies to improve intelligibility of speech during in spontaneous speech Progressing as expected  -GUDELIA     Comments: Patient will apply compensatory strategies to improve intelligibility of speech during in spontaneous speech 9/5/2018: reviewed strategies for increased intelligibility. gave strategies; patient demonstrated good use in structured tasks  -GUDELIA     \"Patient will increase strength and power  of articulators so they can move more quickly and accurately to improve speech intelligibility by completing lip exercises\" 80%:  -GUDELIA     Status: Patient will increase strength and power  of articulators so they can move more quickly and accurately to improve speech intelligibility by completing lip exercises Progressing as expected  -GUDELIA     Comments: Patient will increase strength and power  of articulators so they can move more quickly and accurately to improve speech intelligibility by completing lip exercises 9/5/2018: 8/10, pt has diffiuclty with coordinating breath support and speech. 8/28/2018: multisyllabic word drills using intelligibility   -        Memory Goals    Memory LTG's Patient will be able to remember  information needed to participate in activities of daily living;Patient and family will implement compensatory strategies to maximize patient’s Memory function so patient can continue to participate in daily activities  -GUDELIA     Patient and family will implement compensatory strategies to maximize patient’s Memory function so patient can continue to participate in daily activities 90%:  -GUDELIA     Status: Patient and family will implement compensatory strategies to maximize patient’s Memory function so patient can continue to participate in daily activities Progressing as expected  -GUDELIA     Comments: " Patient and family will implement compensatory strategies to maximize patient’s Memory function so patient can continue to participate in daily activities 9/5/2018: pt is not asking for things at home. discussed increasing independence with adls by participating using strategies, keeping a calendar to know when appointments are.   -GUDELIA     Patient will be able to remember  information needed to participate in activities of daily living 80%  -GUDELIA     Status: Patient will be able to remember  information needed to participate in activities of daily living Progressing as expected  -GUDELIA     Comments: Patient will be able to remember  information needed to participate in activities of daily living 9/5/2018: discussed strategies for recall with patient including reciting, performing only one task at a time and stating the tasks; pt has diffiuclty with motor planning  activities especially when interupted with someone talking. limit distractions when pt is performing adls. 8/28/2018: initiated some strategies for recall and shared with pt daughter  -GUDELIA     Memory STG's Patient will demonstrate improved ability to recall information by immediately recalling a series of words;Patient’s memory skills will be enhanced as reported by patient by using external memory aides;Patient will demonstrate improved ability to recall information by stating activities patient has completed that day  -GUDELIA     Patient will demonstrate improved ability to recall information by immediately recalling a series of words 90%:;with cues;with intermittent cues  -GUDELIA     Status: Patient will demonstrate improved ability to recall information by immediately recalling a series of words Progressing as expected  -GUDELIA     Comments: Patient will demonstrate improved ability to recall information by immediately recalling a series of words 9/5/2018: 3/3after 3 minutes; 2/3; 3/3 with cues.  /3 after 5 minutes.  8/28/2018: immediate recall of 3 words 100% unrelated  words; 4 words related-5/6; unrelated difficult, 8/21/2018:3 words immediate recall 5/7; 4 words 2/4;   -GUDELIA     Patient’s memory skills will be enhanced as reported by patient by using external memory aides 100%:  -GUDELIA     Status: Patient’s memory skills will be enhanced as reported by patient by using external memory aides Progressing as expected  -GUDELIA     Comments: Patient’s memory skills will be enhanced as reported by patient by using external memory aides 8/28/2018: external memory aids introduced including rehearsal and visual cues. improving memory of words by 25%.8/21/2018: not addressed today   -GUDELIA     Patient will demonstrate improved ability to recall information by stating activities patient has completed that day 80%:  -GUDELIA     Status: Patient will demonstrate improved ability to recall information by stating activities patient has completed that day Progressing as expected  -GUDELIA     Comments: Patient will demonstrate improved ability to recall information by stating activities patient has completed that day 8/28/2018: recalling 3 events of the day with  cues required 2/3 and 3/3 with more prompts needed. 8/21/2018: recalling 3 events of the day with 90% min cues.  -GUDELIA        Other Goals    Other Adult Goal- 1 Continue/complete testing to determine accurate level of deficits and establish/revise goals if needed.  -GUDELIA     Status: Other Adult Goal- 1 Achieved  -GUDELIA     Comments: Other Adult Goal- 1 8/21/2018: completed testing. addressed results and deficits with patient.   -GUDELIA     Other Adult Goal- 2 Pt will answer questions after reading a 36-50 word paragraph with 80% accuracy  -GUDELIA     Status: Other Adult Goal- 2 Progressing as expected  -GUDELIA     Comments: Other Adult Goal- 2 8/28/2018: 3/5 correct; 4/5;min cues needed  -GUDELIA     Other Adult Goal- 3 Pt will complete temporal and multifactoral problem solving tasks with cues as needed to perform correctly 80% of the time.   -GUDELIA     Status: Other Adult Goal- 3  Progressing as expected  -GUDELIA     Comments: Other Adult Goal- 3 9/5/2018: requires mod max cues for 2/4. 8/28/2018: introduced new goal today.  -GUDELIA        SLP Time Calculation    SLP Goal Re-Cert Due Date 11/11/18  -GUDELIA       User Key  (r) = Recorded By, (t) = Taken By, (c) = Cosigned By    Initials Name Provider Type    Lyric Shah MS CCC-SLP Speech and Language Pathologist                OP SLP Education     Row Name 09/05/18 1400       Education    Barriers to Learning Other (comment0  -GUDELIA    Action Taken to Address Barriers simple commands, less distractions, cues   -GUDELIA    Assessed Learning needs;Learning motivation;Learning preferences;Learning readiness  -GUDELIA    Learning Motivation Strong  -GUDELIA    Learning Method Explanation;Demonstration;Written materials  -GUDELIA    Education Comments ongoing for patient and family  -GUDELIA      User Key  (r) = Recorded By, (t) = Taken By, (c) = Cosigned By    Initials Name Effective Dates    Lyric Shah MS CCC-SLP 08/03/18 -                 OP SLP Assessment/Plan - 09/05/18 1400        SLP Assessment    Functional Problems Speech Language- Adult/Cognition  -GUDELIA    Impact on Function: Adult Speech Language/Cognition Difficulty communicating wants, needs, and ideas;Difficulty sequencing thoughts to express complex messages;Difficulty participating in avocational activities;Restrictions in personal and social life  -GUDELIA    Clinical Impression: Speech Language-Adult/Congnition Moderate:;Cognitive Communication Impairment  -GUDELIA    Please refer to paper survey for additional self-reported information Yes  -GUDELIA    Please refer to items scanned into chart for additional diagnostic informaiton and handouts as provided by clinician Yes  -GUDELIA    Prognosis Good (comment)  -GUDELIA    Patient/caregiver participated in establishment of treatment plan and goals Yes  -GUDELIA    Patient would benefit from skilled therapy intervention Yes  -GUDELIA       SLP Plan    Expected Duration Therapy Session -  minutes 45-60 minutes  -GUDELIA    Plan Comments continue with plan of care as stated.   -GUDELIA      User Key  (r) = Recorded By, (t) = Taken By, (c) = Cosigned By    Initials Name Provider Type    Lyric Shah, MS CCC-SLP Speech and Language Pathologist                 Time Calculation:   SLP Start Time: 1400    Therapy Charges for Today     Code Description Service Date Service Provider Modifiers Qty    12468423728 HC ST TREATMENT SPEECH 4 9/5/2018 Lyric Mills MS CCC-SLP GN 1                   Lyric Mills MS CCC-SLP  9/5/2018

## 2018-09-06 ENCOUNTER — HOSPITAL ENCOUNTER (OUTPATIENT)
Dept: OCCUPATIONAL THERAPY | Facility: HOSPITAL | Age: 83
Setting detail: THERAPIES SERIES
Discharge: HOME | End: 2018-09-06

## 2018-09-06 DIAGNOSIS — R27.8 DECREASED COORDINATION: ICD-10-CM

## 2018-09-06 DIAGNOSIS — Z78.9 DECREASED INDEPENDENCE WITH ACTIVITIES OF DAILY LIVING: Primary | ICD-10-CM

## 2018-09-06 DIAGNOSIS — Z74.09 DECREASED FUNCTIONAL MOBILITY AND ENDURANCE: ICD-10-CM

## 2018-09-06 PROCEDURE — G8979 MOBILITY GOAL STATUS: HCPCS | Performed by: PHYSICAL THERAPIST

## 2018-09-06 PROCEDURE — G8978 MOBILITY CURRENT STATUS: HCPCS | Performed by: PHYSICAL THERAPIST

## 2018-09-06 PROCEDURE — 97530 THERAPEUTIC ACTIVITIES: CPT | Performed by: OCCUPATIONAL THERAPIST

## 2018-09-06 PROCEDURE — 97110 THERAPEUTIC EXERCISES: CPT | Performed by: OCCUPATIONAL THERAPIST

## 2018-09-06 NOTE — THERAPY PROGRESS REPORT/RE-CERT
.Outpatient Physical Therapy Neuro Re-Assessment   [facility]     Patient Name: Dakota Brizuela  : 1930  MRN: [id number]  Today's Date: 2018      Visit Date: 2018    Patient Active Problem List   Diagnosis   • Primary osteoarthritis of right knee   • Hypertension   • GERD (gastroesophageal reflux disease)   • Hyperlipidemia   • PSP (progressive supranuclear palsy) (CMS/HCC)   • Traumatic rhabdomyolysis (CMS/HCC)   • Hyperglycemia   • Leukocytosis   • Low TSH level   • Normocytic anemia   • Acute metabolic encephalopathy   • Elevated troponin   • Hematemesis   • Thyroid disease   • Enteritis   • Acute blood loss anemia due to UGIB   • Ulcer of esophagus with bleeding        Past Medical History:   Diagnosis Date   • BPH (benign prostatic hyperplasia)    • GERD (gastroesophageal reflux disease)    • Hyperlipidemia    • Hypertension    • Progressive supranuclear ophthalmoplegia (CMS/HCC)    • Rhabdomyolysis    • Thyroid disease         Past Surgical History:   Procedure Laterality Date   • APPENDECTOMY     • ENDOSCOPY N/A 3/30/2018    Procedure: ESOPHAGOGASTRODUODENOSCOPY;  Surgeon: Mark I Brunner, MD;  Location: [facility];  Service: Gastroenterology   • PROSTATE SURGERY           Visit Dx:     ICD-10-CM ICD-9-CM   1. PSP (progressive supranuclear palsy) (CMS/HCC) G23.1 333.0   2. Impaired functional mobility, balance, gait, and endurance Z74.09 V49.89                     PT Neuro     Row Name 18 1515             Subjective Comments    Subjective Comments Patient's daughter reports he continues to get caught on floor transitions.   -KL         Subjective Pain    Pre-Treatment Pain Level 0  -KL      Post-Treatment Pain Level 0  -KL         Gait/Stairs Assessment/Training    Comment (Gait/Stairs) attempted walking with music and utilizing counting techniques x 5 min   -KL         Balance Skills Training    Training Strategies (Balance) Re-assessment performed - see for details  -KL          Balance-Based Torso-Weighting Assessment Performed?    Balance-Based Torso-Weighting Assessment (Trigger) Yes  -KL         Balance-Based Torso-Weighting Assessment    Identify Abnormal Balance/Motor Control shuffling gait with increased USHA   -KL      Balance Examination Position Sitting   and standing for perturbations   -KL      Ease of Obtaining Testing Position Severe Difficulty, needs assistance  -KL         Reactive/Anticipatory Control Impairments (Upper Body)    Control Impairment- Anterior (Upper Body) 1: Minimal  -KL      Control Impairment- Posterior (Upper Body) 1: Minimal  -KL      Control Impairment- Lateral Right (Upper Body) 1: Minimal  -KL      Control Impairment- Lateral Left (Upper Body) 1: Minimal  -KL      Control Impairment- Rotation Right (Upper Body) --   could not perform  -KL      Control Impairment- Rotation Left (Upper Body) --   could not perform  -KL         Reactive/Anticipatory Control Impairments (Lower Body)    Control Impairment- Anterior (Lower Body) 1: Minimal  -KL      Control Impairment- Posterior (Lower Body) 1: Minimal  -KL      Control Impairment- Lateral Right (Lower Body) 1: Minimal  -KL      Control Impairment- Lateral Left (Lower Body) 1: Minimal  -KL      Control Impairment- Rotation Right (Lower Body) --   unable to perform  -KL      Control Impairment- Rotation Left (Lower Body) --   unable to perform  -KL         Body-Based Torso-Weighting Assessment Final Comments    Body-Based Torso-Weighting Assessment Comments Pt unable to achieve testing position without holding. LSO was donned to assess any change with gait initiation. One weight placed on the R lateral region.   -KL        User Key  (r) = Recorded By, (t) = Taken By, (c) = Cosigned By    Initials Name Provider Type    Liz Hinton, PT Physical Therapist                             PT OP Goals     Row Name 09/05/18 2527          PT Short Term Goals    STG Date to Achieve 09/19/18  -KL     STG 1  Patient and caregiver will report compliance with HEP.   -     STG 1 Progress Met  -     STG 2 Patient to improve LOWERY balance score to >/= 21/56 to decrease client's risk of falls.  -     STG 2 Progress Ongoing  -     STG 3 Patient to perform TUG within 1.5 minutes without LOB for improved functional mobility.  -     STG 3 Progress Ongoing  -        Long Term Goals    LTG Date to Achieve 10/31/18  -KL     LTG 1 Patient and caregiver will be I with HEP.   -KL     LTG 1 Progress Ongoing  -KL     LTG 2 Patient to improve LOWERY balance score to >/= 23/56 to decrease client's risk of falls.  -KL     LTG 2 Progress Ongoing  -KL     LTG 3 Patient to perform TUG within 1 minute without LOB for improved functional mobility.  -     LTG 3 Progress Ongoing  -       User Key  (r) = Recorded By, (t) = Taken By, (c) = Cosigned By    Initials Name Provider Type    Liz Hinton, PT Physical Therapist                PT Assessment/Plan     Row Name 09/06/18 1138          PT Assessment    Assessment Comments Patient demonstrates little change since beginning skilled PT. He will be evaluated by a neurologist next week, whom can prescribe medication for PBA, which will improve QOL greatly. He will continue to be seen for skilled PT intervention to address deficits, such as gait initiation, antalgic gait and balance deficits.   -        PT Plan    PT Frequency 1x/week  -     Predicted Duration of Therapy Intervention (Therapy Eval) 8 wks   -     PT Plan Comments Patient will continue to be seen 1x/wk x 8 wks with current plan of care. Daughter was asked to obtain a laser for his walker.   -       User Key  (r) = Recorded By, (t) = Taken By, (c) = Cosigned By    Initials Name Provider Type    Liz Hinton, PT Physical Therapist                   Exercises     Row Name 09/06/18 1141 09/05/18 1515          Subjective Comments    Subjective Comments  -- Patient's daughter reports he continues to get  caught on floor transitions.   -KL        Subjective Pain    Pre-Treatment Pain Level  -- 0  -KL     Post-Treatment Pain Level  -- 0  -KL        Total Minutes    04490 - PT Therapeutic Exercise Minutes 6  -KL  --     03037 -  PT Neuromuscular Reeducation Minutes 34  -KL  --        Exercise 1    Exercise Name 1  -- Nu-Step L4  -KL     Time 1  -- 5 min  -KL     Additional Comments  -- pt stops several times and feels the nu-step is bothering his knee  -KL       User Key  (r) = Recorded By, (t) = Taken By, (c) = Cosigned By    Initials Name Provider Type    Liz Hinton, PT Physical Therapist                      Outcome Measure Options: Rojas Balance, Timed Up and Go (TUG), 10 Meter Walk       Time Calculation:   Start Time: 1515  Total Timed Code Minutes- PT: 40 minute(s)   Therapy Suggested Charges     Code   Minutes Charges    71471 (CPT®) Hc Pt Neuromusc Re Education Ea 15 Min 34 2    83884 (CPT®) Hc Pt Ther Proc Ea 15 Min 6 1    17926 (CPT®) Hc Gait Training Ea 15 Min      37056 (CPT®) Hc Pt Therapeutic Act Ea 15 Min      04562 (CPT®) Hc Pt Manual Therapy Ea 15 Min      05572 (CPT®) Hc Pt Ther Massage- Per 15 Min      87884 (CPT®) Hc Pt Iontophoresis Ea 15 Min      81977 (CPT®) Hc Pt Elec Stim Ea-Per 15 Min      99066 (CPT®) Hc Pt Ultrasound Ea 15 Min      24463 (CPT®) Hc Pt Self Care/Mgmt/Train Ea 15 Min      55132 (CPT®) Hc Pt Prosthetic (S) Train Initial Encounter, Each 15 Min      22094 (CPT®) Hc Orthotic(S) Mgmt/Train Initial Encounter, Each 15min      03475 (CPT®) Hc Pt Aquatic Therapy Ea 15 Min      18028 (CPT®) Hc Pt Orthotic(S)/Prosthetic(S) Encounter, Each 15 Min      Total  40 3        Therapy Charges for Today     Code Description Service Date Service Provider Modifiers Qty    46838281925 HC PT NEUROMUSC RE EDUCATION EA 15 MIN 9/5/2018 Liz Urbina, PT GP 2    56053773528 HC PT THER PROC EA 15 MIN 9/5/2018 Liz Urbina, PT GP 1    02143597590 HC PT MOBILITY CURRENT 9/6/2018  Liz Urbina, PT GP, CM 1    71182076057 HC PT MOBILITY PROJECTED 9/6/2018 Liz Urbina, PT GP, CL 1          PT G-Codes  Outcome Measure Options: Rojas Balance, Timed Up and Go (TUG), 10 Meter Walk  Functional Limitation: Mobility: Walking and moving around  Mobility: Walking and Moving Around Current Status (): At least 80 percent but less than 100 percent impaired, limited or restricted  Mobility: Walking and Moving Around Goal Status (): At least 60 percent but less than 80 percent impaired, limited or restricted         Liz Urbina, PT  9/6/2018

## 2018-09-07 NOTE — THERAPY TREATMENT NOTE
Outpatient Occupational Therapy Neuro Treatment Note   [place]     Patient Name: Dakota Brizuela  : 1930  MRN: [id number]  Today's Date: 2018       Visit Date: 2018    Patient Active Problem List   Diagnosis   • Primary osteoarthritis of right knee   • Hypertension   • GERD (gastroesophageal reflux disease)   • Hyperlipidemia   • PSP (progressive supranuclear palsy) (CMS/HCC)   • Traumatic rhabdomyolysis (CMS/HCC)   • Hyperglycemia   • Leukocytosis   • Low TSH level   • Normocytic anemia   • Acute metabolic encephalopathy   • Elevated troponin   • Hematemesis   • Thyroid disease   • Enteritis   • Acute blood loss anemia due to UGIB   • Ulcer of esophagus with bleeding        Past Medical History:   Diagnosis Date   • BPH (benign prostatic hyperplasia)    • GERD (gastroesophageal reflux disease)    • Hyperlipidemia    • Hypertension    • Progressive supranuclear ophthalmoplegia (CMS/HCC)    • Rhabdomyolysis    • Thyroid disease         Past Surgical History:   Procedure Laterality Date   • APPENDECTOMY     • ENDOSCOPY N/A 3/30/2018    Procedure: ESOPHAGOGASTRODUODENOSCOPY;  Surgeon: Mark I Brunner, MD;  Location: [facility];  Service: Gastroenterology   • PROSTATE SURGERY           Visit Dx:    ICD-10-CM ICD-9-CM   1. Decreased independence with activities of daily living Z65.8 V62.89   2. Decreased functional mobility and endurance Z74.09 780.99   3. Decreased coordination R27.9 781.3                         OT Assessment/Plan     Row Name 18 1400 18 1138       OT Assessment    Assessment Comments Pt demo difficulty with functional mobility which is worsened by multiple VC and distractions. Pt requires VC for upright posture and bilateral coordination tasks.   -EM  --       OT Plan    Predicted Duration of Therapy Intervention (Therapy Eval)  -- 8 wks   -KL    OT Plan Comments Continue per POC  -EM  --      User Key  (r) = Recorded By, (t) = Taken By, (c) = Cosigned By     Initials Name Provider Type    Liz Hinton, PT Physical Therapist    Virginia Marcus, OTR Occupational Therapist              Therapy Education  Given: HEP, Posture/body mechanics  Program: New  How Provided: Verbal, Demonstration  Provided to: Patient, Caregiver  Level of Understanding: Verbalized, Demonstrated              OT Exercises     Row Name 09/06/18 1400             Precautions    Existing Precautions/Restrictions fall  -EM         Subjective Comments    Subjective Comments Pt reports it took 20  mins to walk into the building  -EM         Subjective Pain    Able to rate subjective pain? yes  -EM      Pre-Treatment Pain Level 0  -EM      Post-Treatment Pain Level 0  -EM         Exercise 1    Exercise Name 1 Energy and strength in UB   -EM      Equipment 1 UE Ergometer  -EM      Time (Minutes) 1 2 mins each way at level 3  -EM         Exercise 2    Exercise Name 2 Pt completed FMC and cognitive activity of playing cards with increased time and mod VC to follow directions.   -EM         Exercise 3    Exercise Name 3 Pt was min A for sit-stand transitions and functional mobility throughout gym, on/off elevator and through automatic doors and the parking lot.  He demo festinating gate and freezing episodes with max VC while using rollator.   -EM        User Key  (r) = Recorded By, (t) = Taken By, (c) = Cosigned By    Initials Name Provider Type    Virginia Marcus, OTR Occupational Therapist                        Time Calculation:   OT Start Time: 1400     Therapy Suggested Charges     Code   Minutes Charges    49699 (CPT®) Hc Ot Neuromusc Re Education Ea 15 Min      77680 (CPT®) Hc Ot Ther Proc Ea 15 Min 15 1    04451 (CPT®) Hc Ot Therapeutic Act Ea 15 Min 30 2    53632 (CPT®) Hc Ot Manual Therapy Ea 15 Min      54729 (CPT®) Hc Ot Iontophoresis Ea 15 Min      02860 (CPT®) Hc Ot Elec Stim Ea-Per 15 Min      88522 (CPT®) Hc Ot Ultrasound Ea 15 Min      38517 (CPT®) Hc Ot Self Care/Mgmt/Train Ea 15  Min      Total  45 3          Therapy Charges for Today     Code Description Service Date Service Provider Modifiers Qty    57299920257  OT THER PROC EA 15 MIN 9/6/2018 Virginia Guadalupe OTR GO 1    [id number]  OT THERAPEUTIC ACT EA 15 MIN 9/6/2018 Virginia Guadalupe OTR GO 2                    ANN-MARIE Alcantar  9/7/2018

## 2018-09-11 ENCOUNTER — OFFICE VISIT (OUTPATIENT)
Dept: NEUROLOGY | Facility: CLINIC | Age: 83
End: 2018-09-11

## 2018-09-11 VITALS
RESPIRATION RATE: 16 BRPM | HEIGHT: 69 IN | DIASTOLIC BLOOD PRESSURE: 64 MMHG | SYSTOLIC BLOOD PRESSURE: 120 MMHG | OXYGEN SATURATION: 97 % | WEIGHT: 165 LBS | BODY MASS INDEX: 24.44 KG/M2 | HEART RATE: 76 BPM

## 2018-09-11 DIAGNOSIS — G23.1 PSP (PROGRESSIVE SUPRANUCLEAR PALSY) (HCC): Primary | ICD-10-CM

## 2018-09-11 DIAGNOSIS — F48.2 PBA (PSEUDOBULBAR AFFECT): ICD-10-CM

## 2018-09-11 DIAGNOSIS — F02.818 DEMENTIA ASSOCIATED WITH OTHER UNDERLYING DISEASE WITH BEHAVIORAL DISTURBANCE (HCC): ICD-10-CM

## 2018-09-11 PROBLEM — G93.41 ACUTE METABOLIC ENCEPHALOPATHY: Status: RESOLVED | Noted: 2018-03-20 | Resolved: 2018-09-11

## 2018-09-11 PROCEDURE — 99205 OFFICE O/P NEW HI 60 MIN: CPT | Performed by: PSYCHIATRY & NEUROLOGY

## 2018-09-11 NOTE — ASSESSMENT & PLAN NOTE
Psychological condition is worsening.  Medication changes per orders.  Psychological condition  will be reassessed at the next regular appointment     Will add Aricept/Exelon .

## 2018-09-11 NOTE — PROGRESS NOTES
Subjective   Patient ID: Dakota Brizuela is a 88 y.o. male     Chief Complaint   Patient presents with   • PSP        History of Present Illness    88 y.o. male referred by Dr Hernandez for Parkinsonism/PSP.  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      Presented with frequent falls and trouble speaking  Stopped driving a year.  Playing bridge up to a year ago.  Started using a walker 2017.      ST memory started declining a year ago.      Living at home with Dtr.  Dtr started making all meals 18 months.  Cannot use coffee pot, or microwave.  Able to dress himself.  Assistant comes in to do bathing twice a week.  Can brush his teeth.      Steps are short shuffling, festinating gait and freezing.      Occasional anger outbursts.      Current sx of emotional lability of laughing and crying.      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  [facility] R hemicolectomy partial small bowel resection    Stopped playing golf 2014 and started using walking stick.  Treated for OA of knees Ortho    3/20/18 fell and remained on floor for 12 hours.  Noted to have increasing confusion.  Traumatic rhabdo.  CXR bilat infitrates tx with Zosyn.  Speech eval recs aspiration precautions.  Discharged to [facility].     HCT, my review of films, moderate atrophy     Admitted 3/30/18 - 4/1/18 for hematemesis.    Past Medical History:   Diagnosis Date   • Arthritis    • BPH (benign prostatic hyperplasia)    • Cancer (CMS/HCC)    • GERD (gastroesophageal reflux disease)    • Hyperlipidemia    • Hypertension    • Progressive supranuclear ophthalmoplegia (CMS/HCC)    • Rhabdomyolysis    • Thyroid disease      Family History   Problem Relation Age of Onset   • No Known Problems Mother    • No Known Problems Father    • Heart attack Brother      Social History     Social History   • Marital status:      Social History Main Topics   • Smoking status: Never Smoker   • Smokeless  "tobacco: Never Used   • Alcohol use Yes      Comment: 1/2 glass red wine    • Drug use: No   • Sexual activity: Defer     Other Topics Concern   • Not on file       Review of Systems   Constitutional: Positive for fatigue. Negative for activity change and unexpected weight change.   HENT: Positive for trouble swallowing. Negative for facial swelling, hearing loss, tinnitus and voice change.    Eyes: Positive for visual disturbance. Negative for photophobia and pain.   Respiratory: Negative for apnea, cough and choking.    Cardiovascular: Negative for chest pain.   Gastrointestinal: Negative for constipation, nausea and vomiting.   Endocrine: Negative for cold intolerance.   Genitourinary: Negative for difficulty urinating, frequency and urgency.   Musculoskeletal: Positive for gait problem. Negative for arthralgias, back pain, myalgias, neck pain and neck stiffness.   Skin: Negative for rash.   Allergic/Immunologic: Negative for immunocompromised state.   Neurological: Positive for weakness. Negative for dizziness, tremors, seizures, syncope, facial asymmetry, speech difficulty, light-headedness, numbness and headaches.   Hematological: Negative for adenopathy.   Psychiatric/Behavioral: Positive for confusion and decreased concentration. Negative for hallucinations and sleep disturbance. The patient is not nervous/anxious.        Objective     Vitals:    09/11/18 0829   BP: 120/64   BP Location: Left arm   Patient Position: Sitting   Cuff Size: Adult   Pulse: 76   Resp: 16   SpO2: 97%   Weight: 74.8 kg (165 lb)   Height: 175.3 cm (69\")       Neurologic Exam     Mental Status   Oriented to person.   Disoriented to place. Disoriented to street and number. Oriented to country.   Oriented to time. Disoriented to date. Oriented to year, month, day and season.   Registration: recalls 3 of 3 objects. Recall at 5 minutes: recalls 3 of 3 objects. Follows 1 step commands.   Attention: decreased. Concentration: decreased. "   Speech: slurred   Level of consciousness: alert  Knowledge: poor and inconsistent with education. Unable to perform simple calculations.   Able to name object. Unable to read. Unable to repeat. Unable to write. Abnormal comprehension.     Cranial Nerves     CN II   Visual fields full to confrontation.   Visual acuity: normal  Right visual field deficit: none  Left visual field deficit: none     CN III, IV, VI   Pupils are equal, round, and reactive to light.  Extraocular motions are normal.   Right pupil: Shape: regular. Reactivity: brisk. Consensual response: intact.   Left pupil: Shape: regular. Reactivity: brisk. Consensual response: intact.   Nystagmus: none   Diplopia: none  Ophthalmoparesis: none  Upgaze: abnormal  Downgaze: abnormal  Conjugate gaze: present  Vestibulo-ocular reflex: absent    CN V   Facial sensation intact.   Right corneal reflex: normal  Left corneal reflex: normal    CN VII   Right facial weakness: central  Left facial weakness: central    CN VIII   Hearing: intact    CN IX, X   Palate: symmetric  Right gag reflex: normal  Left gag reflex: normal    CN XI   Right sternocleidomastoid strength: normal  Left sternocleidomastoid strength: normal    CN XII   Tongue: not atrophic  Fasciculations: absent  Tongue deviation: none    Motor Exam   Muscle bulk: normal  Overall muscle tone: increased  Right arm tone: increased  Left arm tone: increased  Right leg tone: increased  Left leg tone: increased    Strength   Strength 5/5 throughout.   Right neck flexion: 4/5  Left neck flexion: 4/5  Right neck extension: 4/5  Left neck extension: 4/5  Right deltoid: 4/5  Left deltoid: 4/5  Right biceps: 4/5  Left biceps: 4/5  Right triceps: 4/5  Left triceps: 4/5  Right wrist flexion: 4/5  Left wrist flexion: 4/5  Right wrist extension: 4/5  Left wrist extension: 4/5  Right interossei: 4/5  Left interossei: 4/5  Right abdominals: 4/5  Left abdominals: 4/5  Right iliopsoas: 4/5  Left iliopsoas: 4/5  Right  quadriceps: 4/5  Left quadriceps: 4/5  Right hamstrin/5  Left hamstrin/5  Right glutei: 4/5  Left glutei: 4/5  Right anterior tibial: 4/5  Left anterior tibial: 4/5  Right posterior tibial: 4/5  Left posterior tibial: 4/5  Right peroneal: 4/5  Left peroneal: 4/5  Right gastroc: 4/5  Left gastroc: 4/5    Sensory Exam   Light touch normal.   Vibration normal.   Proprioception normal.   Pinprick normal.     Gait, Coordination, and Reflexes     Gait  Gait: shuffling and wide-based    Coordination   Romberg: positive  Finger to nose coordination: abnormal  Heel to shin coordination: abnormal  Tandem walking coordination: abnormal    Tremor   Resting tremor: absent  Intention tremor: absent  Action tremor: absent    Reflexes   Reflexes 2+ except as noted.       Physical Exam   Constitutional: Vital signs are normal. He appears well-developed and well-nourished.   HENT:   Head: Normocephalic and atraumatic.   Eyes: Pupils are equal, round, and reactive to light. EOM and lids are normal.   Fundoscopic exam:       The right eye shows no exudate, no hemorrhage and no papilledema. The right eye shows venous pulsations.        The left eye shows no exudate, no hemorrhage and no papilledema. The left eye shows venous pulsations.   Neck: Normal range of motion and phonation normal. Normal carotid pulses present. Carotid bruit is not present. No thyroid mass and no thyromegaly present.   Cardiovascular: Normal rate, regular rhythm and normal heart sounds.    Pulmonary/Chest: Effort normal.   Neurological: He has normal strength. He has an abnormal Finger-Nose-Finger Test, an abnormal Heel to Costa Test, an abnormal Romberg Test and an abnormal Tandem Gait Test.   Skin: Skin is warm and dry.   Psychiatric: He has a normal mood and affect. His speech is slurred.   Nursing note and vitals reviewed.      Admission on 2018, Discharged on 2018   Component Date Value Ref Range Status   • Glucose 2018 160* 70 -  100 mg/dL Final   • BUN 03/30/2018 25* 9 - 23 mg/dL Final   • Creatinine 03/30/2018 0.90  0.60 - 1.30 mg/dL Final   • Sodium 03/30/2018 140  132 - 146 mmol/L Final   • Potassium 03/30/2018 3.9  3.5 - 5.5 mmol/L Final   • Chloride 03/30/2018 103  99 - 109 mmol/L Final   • CO2 03/30/2018 24.0  20.0 - 31.0 mmol/L Final   • Calcium 03/30/2018 8.8  8.7 - 10.4 mg/dL Final   • Total Protein 03/30/2018 6.5  5.7 - 8.2 g/dL Final   • Albumin 03/30/2018 3.90  3.20 - 4.80 g/dL Final   • ALT (SGPT) 03/30/2018 52* 7 - 40 U/L Final   • AST (SGOT) 03/30/2018 28  0 - 33 U/L Final   • Alkaline Phosphatase 03/30/2018 59  25 - 100 U/L Final   • Total Bilirubin 03/30/2018 0.5  0.3 - 1.2 mg/dL Final   • eGFR Non African Amer 03/30/2018 80  >60 mL/min/1.73 Final   • Globulin 03/30/2018 2.6  gm/dL Final   • A/G Ratio 03/30/2018 1.5  1.5 - 2.5 g/dL Final   • BUN/Creatinine Ratio 03/30/2018 27.8* 7.0 - 25.0 Final   • Anion Gap 03/30/2018 13.0* 3.0 - 11.0 mmol/L Final   • Lipase 03/30/2018 27  6 - 51 U/L Final   • Fecal Occult Blood 03/30/2018 Positive* Negative Final   • Lot Number 03/30/2018 [id number]   Final   • Expiration Date 03/30/2018 5-20   Final   • DEVELOPER LOT NUMBER 03/30/2018 [id number]   Final   • DEVELOPER EXPIRATION DATE 03/30/2018 2020-2   Final   • Positive Control 03/30/2018 Positive  Positive Final   • Negative Control 03/30/2018 Negative  Negative Final   • Lactate 03/30/2018 2.1* 0.5 - 2.0 mmol/L Final    Falsely depressed results may occur on samples drawn from patients receiving N-Acetylcysteine (NAC) or Metamizole.   • WBC 03/30/2018 10.49  3.50 - 10.80 10*3/mm3 Final   • RBC 03/30/2018 3.88* 4.20 - 5.76 10*6/mm3 Final   • Hemoglobin 03/30/2018 11.7* 13.1 - 17.5 g/dL Final   • Hematocrit 03/30/2018 36.7* 38.9 - 50.9 % Final   • MCV 03/30/2018 94.6  80.0 - 99.0 fL Final   • MCH 03/30/2018 30.2  27.0 - 31.0 pg Final   • MCHC 03/30/2018 31.9* 32.0 - 36.0 g/dL Final   • RDW 03/30/2018 13.4  11.3 - 14.5 % Final   • RDW-SD  03/30/2018 46.3  37.0 - 54.0 fl Final   • MPV 03/30/2018 10.4  6.0 - 12.0 fL Final   • Platelets 03/30/2018 330  150 - 450 10*3/mm3 Final   • Neutrophil % 03/30/2018 91.9* 41.0 - 71.0 % Final   • Lymphocyte % 03/30/2018 2.5* 24.0 - 44.0 % Final   • Monocyte % 03/30/2018 4.6  0.0 - 12.0 % Final   • Eosinophil % 03/30/2018 0.5  0.0 - 3.0 % Final   • Basophil % 03/30/2018 0.1  0.0 - 1.0 % Final   • Immature Grans % 03/30/2018 0.4  0.0 - 0.6 % Final   • Neutrophils, Absolute 03/30/2018 9.65* 1.50 - 8.30 10*3/mm3 Final   • Lymphocytes, Absolute 03/30/2018 0.26* 0.60 - 4.80 10*3/mm3 Final   • Monocytes, Absolute 03/30/2018 0.48  0.00 - 1.00 10*3/mm3 Final   • Eosinophils, Absolute 03/30/2018 0.05  0.00 - 0.30 10*3/mm3 Final   • Basophils, Absolute 03/30/2018 0.01  0.00 - 0.20 10*3/mm3 Final   • Immature Grans, Absolute 03/30/2018 0.04* 0.00 - 0.03 10*3/mm3 Final   • Extra Tube 03/30/2018 Hold for add-ons.   Final    Auto resulted.   • Lactate 03/30/2018 1.7  0.5 - 2.0 mmol/L Final    Falsely depressed results may occur on samples drawn from patients receiving N-Acetylcysteine (NAC) or Metamizole.   • Color, UA 03/31/2018 Yellow  Yellow, Straw Final   • Appearance, UA 03/31/2018 Clear  Clear Final   • pH, UA 03/31/2018 7.0  5.0 - 8.0 Final   • Specific Gravity, UA 03/31/2018 1.026  1.001 - 1.030 Final   • Glucose, UA 03/31/2018 Negative  Negative Final   • Ketones, UA 03/31/2018 Negative  Negative Final   • Bilirubin, UA 03/31/2018 Negative  Negative Final   • Blood, UA 03/31/2018 Negative  Negative Final   • Protein, UA 03/31/2018 Negative  Negative Final   • Leuk Esterase, UA 03/31/2018 Negative  Negative Final   • Nitrite, UA 03/31/2018 Negative  Negative Final   • Urobilinogen, UA 03/31/2018 1.0 E.U./dL  0.2 - 1.0 E.U./dL Final   • WBC 03/31/2018 6.87  3.50 - 10.80 10*3/mm3 Final   • RBC 03/31/2018 3.27* 4.20 - 5.76 10*6/mm3 Final   • Hemoglobin 03/31/2018 9.9* 13.1 - 17.5 g/dL Final   • Hematocrit 03/31/2018 32.0*  38.9 - 50.9 % Final   • MCV 03/31/2018 97.9  80.0 - 99.0 fL Final   • MCH 03/31/2018 30.3  27.0 - 31.0 pg Final   • MCHC 03/31/2018 30.9* 32.0 - 36.0 g/dL Final   • RDW 03/31/2018 13.7  11.3 - 14.5 % Final   • RDW-SD 03/31/2018 48.9  37.0 - 54.0 fl Final   • MPV 03/31/2018 9.4  6.0 - 12.0 fL Final   • Platelets 03/31/2018 262  150 - 450 10*3/mm3 Final   • Glucose 03/31/2018 92  70 - 100 mg/dL Final   • BUN 03/31/2018 18  9 - 23 mg/dL Final   • Creatinine 03/31/2018 1.00  0.60 - 1.30 mg/dL Final   • Sodium 03/31/2018 139  132 - 146 mmol/L Final   • Potassium 03/31/2018 3.6  3.5 - 5.5 mmol/L Final   • Chloride 03/31/2018 111* 99 - 109 mmol/L Final   • CO2 03/31/2018 23.0  20.0 - 31.0 mmol/L Final   • Calcium 03/31/2018 7.7* 8.7 - 10.4 mg/dL Final   • eGFR Non African Amer 03/31/2018 71  >60 mL/min/1.73 Final   • BUN/Creatinine Ratio 03/31/2018 18.0  7.0 - 25.0 Final   • Anion Gap 03/31/2018 5.0  3.0 - 11.0 mmol/L Final   • Hemoglobin 04/01/2018 11.1* 13.1 - 17.5 g/dL Final   • Hematocrit 04/01/2018 34.5* 38.9 - 50.9 % Final         Assessment/Plan     Problem List Items Addressed This Visit        Nervous and Auditory    PSP (progressive supranuclear palsy) (CMS/HCC) - Primary    Current Assessment & Plan     Signicant disability from PSP    Start Sinemet 25/100 one tab TID for one week, then two tabs TID         Dementia associated with other underlying disease with behavioral disturbance    Current Assessment & Plan     Psychological condition is worsening.  Medication changes per orders.  Psychological condition  will be reassessed at the next regular appointment     Will add Aricept/Exelon .         Relevant Medications    dextromethorphan-quinidine (NUEDEXTA) 20-10 MG capsule capsule       Other    PBA (pseudobulbar affect)    Current Assessment & Plan     Start Nuedexta                   No Follow-up on file.

## 2018-09-12 ENCOUNTER — HOSPITAL ENCOUNTER (OUTPATIENT)
Dept: PHYSICAL THERAPY | Facility: HOSPITAL | Age: 83
Setting detail: THERAPIES SERIES
Discharge: HOME | End: 2018-09-12

## 2018-09-12 ENCOUNTER — HOSPITAL ENCOUNTER (OUTPATIENT)
Dept: SPEECH THERAPY | Facility: HOSPITAL | Age: 83
Setting detail: THERAPIES SERIES
Discharge: HOME | End: 2018-09-12

## 2018-09-12 DIAGNOSIS — Z74.09 IMPAIRED FUNCTIONAL MOBILITY, BALANCE, GAIT, AND ENDURANCE: ICD-10-CM

## 2018-09-12 DIAGNOSIS — R41.841 COGNITIVE COMMUNICATION DEFICIT: ICD-10-CM

## 2018-09-12 DIAGNOSIS — R41.89 COGNITIVE DEFICITS: Primary | ICD-10-CM

## 2018-09-12 DIAGNOSIS — G23.1 PSP (PROGRESSIVE SUPRANUCLEAR PALSY) (HCC): Primary | ICD-10-CM

## 2018-09-12 PROCEDURE — 92507 TX SP LANG VOICE COMM INDIV: CPT | Performed by: SPEECH-LANGUAGE PATHOLOGIST

## 2018-09-12 PROCEDURE — 97112 NEUROMUSCULAR REEDUCATION: CPT | Performed by: PHYSICAL THERAPIST

## 2018-09-12 PROCEDURE — G9169 MEMORY GOAL STATUS: HCPCS | Performed by: SPEECH-LANGUAGE PATHOLOGIST

## 2018-09-12 PROCEDURE — G9168 MEMORY CURRENT STATUS: HCPCS | Performed by: SPEECH-LANGUAGE PATHOLOGIST

## 2018-09-12 NOTE — THERAPY TREATMENT NOTE
Outpatient Physical Therapy Neuro Treatment Note  [facility]     Patient Name: Dakota Brizuela  : 1930  MRN: [id number]  Today's Date: 2018      Visit Date: 2018    Visit Dx:    ICD-10-CM ICD-9-CM   1. PSP (progressive supranuclear palsy) (CMS/HCC) G23.1 333.0   2. Impaired functional mobility, balance, gait, and endurance Z74.09 V49.89       Patient Active Problem List   Diagnosis   • Primary osteoarthritis of right knee   • Hypertension   • GERD (gastroesophageal reflux disease)   • Hyperlipidemia   • PSP (progressive supranuclear palsy) (CMS/HCC)   • Traumatic rhabdomyolysis (CMS/HCC)   • Hyperglycemia   • Leukocytosis   • Low TSH level   • Normocytic anemia   • Elevated troponin   • Hematemesis   • Thyroid disease   • Enteritis   • Acute blood loss anemia due to UGIB   • Ulcer of esophagus with bleeding   • PBA (pseudobulbar affect)   • Dementia associated with other underlying disease with behavioral disturbance                 PT Neuro     Row Name 18 [id number]             Subjective Comments    Subjective Comments Patient reported to new neurologist yesterday and he prescribed multiple medications which he and his family are very happy about.   -KL         Subjective Pain    Pre-Treatment Pain Level 0  -KL      Post-Treatment Pain Level 0  -KL         Balance Skills Training    Training Strategies (Balance) In // bars - using metranome between 30-50, BTB tied around knees, pt performed forward step to target keeping beat of metranome. He required min-mod A for knee flexion, as well as weight shifting, especially when moving the RLE. Pt performed multiple reps @ 3 sets with daughter videoing exercise for replication at home. He require 2 seated rest breaks.   -KL        User Key  (r) = Recorded By, (t) = Taken By, (c) = Cosigned By    Initials Name Provider Type    Liz Hinton, PT Physical Therapist                        PT Assessment/Plan     Row Name 18 [id number]           PT Assessment    Assessment Comments Pt demo's improved stepping pattern L>R using theraband to keep feet a manageable distance for weight shifting. Pt will begin carbidopa-levodopa next week with hopes for improvement gross movement.   -KL        PT Plan    PT Plan Comments Continue per POC.   -       User Key  (r) = Recorded By, (t) = Taken By, (c) = Cosigned By    Initials Name Provider Type    Liz Hinton, PT Physical Therapist                     Exercises     Row Name 09/12/18 1035 09/12/18 0930          Subjective Comments    Subjective Comments  -- Patient reported to new neurologist yesterday and he prescribed multiple medications which he and his family are very happy about.   -KL        Subjective Pain    Pre-Treatment Pain Level  -- 0  -KL     Post-Treatment Pain Level  -- 0  -KL        Total Minutes    12747 -  PT Neuromuscular Reeducation Minutes 35  -KL  --       User Key  (r) = Recorded By, (t) = Taken By, (c) = Cosigned By    Initials Name Provider Type    Liz Hinton, PT Physical Therapist                            Therapy Education  Given: HEP  Program: New  How Provided: Verbal, Demonstration, Other (comment)  Provided to: Patient, Caregiver  Level of Understanding: Verbalized, Demonstrated              Time Calculation:   Start Time: 0930  Total Timed Code Minutes- PT: 35 minute(s)   Therapy Suggested Charges     Code   Minutes Charges    51054 (CPT®) Hc Pt Neuromusc Re Education Ea 15 Min 35 2    85855 (CPT®) Hc Pt Ther Proc Ea 15 Min      65068 (CPT®) Hc Gait Training Ea 15 Min      83943 (CPT®) Hc Pt Therapeutic Act Ea 15 Min      08663 (CPT®) Hc Pt Manual Therapy Ea 15 Min      15655 (CPT®) Hc Pt Ther Massage- Per 15 Min      20925 (CPT®) Hc Pt Iontophoresis Ea 15 Min      63146 (CPT®) Hc Pt Elec Stim Ea-Per 15 Min      30406 (CPT®) Hc Pt Ultrasound Ea 15 Min      54625 (CPT®) Hc Pt Self Care/Mgmt/Train Ea 15 Min      95502 (CPT®)  Pt Prosthetic (S) Train  Initial Encounter, Each 15 Min      07440 (CPT®) Hc Orthotic(S) Mgmt/Train Initial Encounter, Each 15min      35475 (CPT®) Hc Pt Aquatic Therapy Ea 15 Min      05004 (CPT®) Hc Pt Orthotic(S)/Prosthetic(S) Encounter, Each 15 Min      Total  35 2        Therapy Charges for Today     Code Description Service Date Service Provider Modifiers Qty    12682044027 HC PT NEUROMUSC RE EDUCATION EA 15 MIN 9/12/2018 Liz Urbina, PT GP 2                    Liz Urbina, PT  9/12/2018

## 2018-09-12 NOTE — THERAPY PROGRESS REPORT/RE-CERT
Outpatient Speech Language Pathology   Adult Speech Language Cognitive Progress Note  [facility]     Patient Name: Dakota Brizuela  : 1930  MRN: [id number]  Today's Date: 2018         Visit Date: 2018   Patient Active Problem List   Diagnosis   • Primary osteoarthritis of right knee   • Hypertension   • GERD (gastroesophageal reflux disease)   • Hyperlipidemia   • PSP (progressive supranuclear palsy) (CMS/HCC)   • Traumatic rhabdomyolysis (CMS/HCC)   • Hyperglycemia   • Leukocytosis   • Low TSH level   • Normocytic anemia   • Elevated troponin   • Hematemesis   • Thyroid disease   • Enteritis   • Acute blood loss anemia due to UGIB   • Ulcer of esophagus with bleeding   • PBA (pseudobulbar affect)   • Dementia associated with other underlying disease with behavioral disturbance          Visit Dx:    ICD-10-CM ICD-9-CM   1. Cognitive deficits R41.89 294.9   2. Cognitive communication deficit R41.841 799.52                               SLP OP Goals     Row Name 18 1000          Goal Type Needed    Goal Type Needed Other Adult Goals  -GUDELIA        Subjective Comments    Subjective Comments pt recalled therapy activities from last week. cooperative for therapy.  -GUDELIA        Subjective Pain    Able to rate subjective pain? yes  -GUDELIA     Pre-Treatment Pain Level 0  -GUDELIA     Post-Treatment Pain Level 0  -GUDELIA        Dysarthria Goals    Patient will be able to engage in speech at the conversational level with maximum articulatory accuracy so that speech is understood by familiar /unfamiliar listeners 90%:  -GUDELIA     Status: Patient will be able to engage in speech at the conversational level with maximum articulatory accuracy so that speech is understood by familiar /unfamiliar listeners Progressing as expected  -GUDELIA     Comments: Patient will be able to engage in speech at the conversational level with maximum articulatory accuracy so that speech is understood by familiar /unfamiliar listeners initiated  "goal, gave patient strategies for overarticulation and increasing breath support for increased intelligibility.  -GUDELIA     Patient will apply compensatory strategies to improve intelligibility of speech during in spontaneous speech 80%:  -GUDELIA     Status: Patient will apply compensatory strategies to improve intelligibility of speech during in spontaneous speech Progressing as expected  -GUDELIA     Comments: Patient will apply compensatory strategies to improve intelligibility of speech during in spontaneous speech 9/12/2018: pt using good breath support for increased intelligibility today. 9/5/2018: reviewed strategies for increased intelligibility. gave strategies; patient demonstrated good use in structured tasks  -GUDELIA     \"Patient will increase strength and power  of articulators so they can move more quickly and accurately to improve speech intelligibility by completing lip exercises\" 80%:  -GUDELIA     Status: Patient will increase strength and power  of articulators so they can move more quickly and accurately to improve speech intelligibility by completing lip exercises Progressing as expected  -GUDELIA     Comments: Patient will increase strength and power  of articulators so they can move more quickly and accurately to improve speech intelligibility by completing lip exercises 9/12/2018: articulatory agility improving. 9/5/2018: 8/10, pt has diffiuclty with coordinating breath support and speech. 8/28/2018: multisyllabic word drills using intelligibility   -GUDELIA        Memory Goals    Memory LTG's Patient will be able to remember  information needed to participate in activities of daily living;Patient and family will implement compensatory strategies to maximize patient’s Memory function so patient can continue to participate in daily activities  -GUDELIA     Patient and family will implement compensatory strategies to maximize patient’s Memory function so patient can continue to participate in daily activities 90%:  -GUDELIA     " Status: Patient and family will implement compensatory strategies to maximize patient’s Memory function so patient can continue to participate in daily activities Progressing as expected  -GUDELIA     Comments: Patient and family will implement compensatory strategies to maximize patient’s Memory function so patient can continue to participate in daily activities 9/12/2018: gave handout and reviewed strategies for home use of memory and communicaiton.  9/5/2018: pt is not asking for things at home. discussed increasing independence with adls by participating using strategies, keeping a calendar to know when appointments are.   -GUDELIA     Patient will be able to remember  information needed to participate in activities of daily living 80%  -GUDELIA     Status: Patient will be able to remember  information needed to participate in activities of daily living Progressing as expected  -GUDELIA     Comments: Patient will be able to remember  information needed to participate in activities of daily living 9/12/2018: recalled sequence for adls including toothbrushing with prompts .9/5/2018: discussed strategies for recall with patient including reciting, performing only one task at a time and stating the tasks; pt has diffiuclty with motor planning  activities especially when interupted with someone talking. limit distractions when pt is performing adls. 8/28/2018: initiated some strategies for recall and shared with pt daughter  -GUDELIA     Memory STG's Patient will demonstrate improved ability to recall information by immediately recalling a series of words;Patient’s memory skills will be enhanced as reported by patient by using external memory aides;Patient will demonstrate improved ability to recall information by stating activities patient has completed that day  -GUDELIA     Patient will demonstrate improved ability to recall information by immediately recalling a series of words 90%:;with cues;with intermittent cues  -GUDELIA     Status: Patient  will demonstrate improved ability to recall information by immediately recalling a series of words Progressing as expected  -GUDELIA     Comments: Patient will demonstrate improved ability to recall information by immediately recalling a series of words 9/12/2018 categories immediate recall requires prompts for 75% 9/5/2018: 3/3after 3 minutes; 2/3; 3/3 with cues.  /3 after 5 minutes.  8/28/2018: immediate recall of 3 words 100% unrelated words; 4 words related-5/6; unrelated difficult, 8/21/2018:3 words immediate recall 5/7; 4 words 2/4;   -GUDELIA     Patient’s memory skills will be enhanced as reported by patient by using external memory aides 100%:  -GUDELIA     Status: Patient’s memory skills will be enhanced as reported by patient by using external memory aides Progressing as expected  -GUDELIA     Comments: Patient’s memory skills will be enhanced as reported by patient by using external memory aides 9/12/2018: memory strategies including calendar and visual aids discussed and utilized with 80% sucess. 8/28/2018: external memory aids introduced including rehearsal and visual cues. improving memory of words by 25%.8/21/2018: not addressed today   -GUDELIA     Patient will demonstrate improved ability to recall information by stating activities patient has completed that day 80%:  -GUDELIA     Status: Patient will demonstrate improved ability to recall information by stating activities patient has completed that day Progressing as expected  -GUDELIA     Comments: Patient will demonstrate improved ability to recall information by stating activities patient has completed that day 9/12/2018: 8/28/2018: recalling 3 events of the day with  cues required 2/3 and 3/3 with more prompts needed. 8/21/2018: recalling 3 events of the day with 90% min cues.  -GUDELIA        Other Goals    Other Adult Goal- 1 Continue/complete testing to determine accurate level of deficits and establish/revise goals if needed.  -GUDELIA     Status: Other Adult Goal- 1 Achieved  -GUDELIA      Comments: Other Adult Goal- 1 8/21/2018: completed testing. addressed results and deficits with patient.   -GUDELIA     Other Adult Goal- 2 Pt will answer questions after reading a 36-50 word paragraph with 80% accuracy  -GUDELIA     Status: Other Adult Goal- 2 Progressing as expected  -GUDELIA     Comments: Other Adult Goal- 2 9/12/2018: paragraphs using talkpath news 3/3, 3/3 2/3. 8/28/2018: 3/5 correct; 4/5;min cues needed  -GUDELIA     Other Adult Goal- 3 Pt will complete temporal and multifactoral problem solving tasks with cues as needed to perform correctly 80% of the time.   -GUDELIA     Status: Other Adult Goal- 3 Progressing as expected  -GUDELIA     Comments: Other Adult Goal- 3 9/5/2018: requires mod max cues for 2/4. 8/28/2018: introduced new goal today.  -GUDELIA        SLP Time Calculation    SLP Goal Re-Cert Due Date 11/11/18  -GUDELIA       User Key  (r) = Recorded By, (t) = Taken By, (c) = Cosigned By    Initials Name Provider Type    Lyric Shah MS CCC-SLP Speech and Language Pathologist                OP SLP Education     Row Name 09/12/18 1000       Education    Barriers to Learning Other (comment0  -GUDELIA    Action Taken to Address Barriers uncontrolled emotional lability limits communication at times.   -GUDELIA    Assessed Learning needs;Learning motivation;Learning preferences;Learning readiness  -GUDELIA    Learning Motivation Strong  -GUDELIA    Learning Method Explanation;Demonstration;Teach back;Written materials  -GUDELIA    Teaching Response Demonstrated understanding;Verbalized understanding;Reinforcement needed  -GUDELIA    Education Comments ongoing. gave handouts.   -GUDELIA      User Key  (r) = Recorded By, (t) = Taken By, (c) = Cosigned By    Initials Name Effective Dates    Lyric Shah MS CCC-SLP 08/03/18 -                 OP SLP Assessment/Plan - 09/12/18 1000        SLP Assessment    Functional Problems Speech Language- Adult/Cognition  -GUDELIA    Clinical Impression: Speech Language-Adult/Congnition Moderate:;Cognitive Communication  Impairment  -GUDELIA    Please refer to paper survey for additional self-reported information Yes  -GUDELIA    Please refer to items scanned into chart for additional diagnostic informaiton and handouts as provided by clinician Yes  -GUDELIA    Prognosis Good (comment)  -GUDELIA    Patient/caregiver participated in establishment of treatment plan and goals Yes  -GUDELIA    Patient would benefit from skilled therapy intervention Yes  -GUDELIA       SLP Plan    Expected Duration Therapy Session - minutes 45-60 minutes  -GUDELIA    Plan Comments continue plan of care as stated   -GUDELIA      User Key  (r) = Recorded By, (t) = Taken By, (c) = Cosigned By    Initials Name Provider Type    Lyric Shah MS CCC-SLP Speech and Language Pathologist             SLP Outcome Measures (last 72 hours)      SLP Outcome Measures     Row Name 09/12/18 1000             SLP Outcome Measures    Outcome Measure Used? Adult NOMS  -GUDELIA      Date of Onset/Exacerbation of Primary Medical Diagnosis 3 to < 6 months  -GUDELIA      SLP Diagnoses Cognitive-Communication Disorder  -GUDELIA      Current Treatment Setting Outpatient Rehab  -GUDELIA      Is English the Primary Language of this Patient? Yes  -GUDELIA         Adult FCM Scores    FCM Chosen Memory  -GUDELIA      Memory FCM Score 4  -GUDELIA      Memory Primary Service Delivery Model FCM Individual  -GUDELIA        User Key  (r) = Recorded By, (t) = Taken By, (c) = Cosigned By    Initials Name Effective Dates    Lyric Shah MS CCC-SLP 08/03/18 -              Time Calculation:   SLP Start Time: 1000    Therapy Charges for Today     Code Description Service Date Service Provider Modifiers Qty    65351099488 HC ST MEMORY CURRENT 9/12/2018 Lyric Mills MS CCC-SLP GN, CK 1    86473104422 HC ST MEMORY PROJECTED 9/12/2018 Lyric Mills MS CCC-SLP GN, CJ 1    68311484430 HC ST TREATMENT SPEECH 5 9/12/2018 Lyric Mills MS CCC-SLP GN 1          SLP G-Codes  SLP NOMS Used?: Yes  Functional Limitations: Memory  Memory Current Status (): At  least 40 percent but less than 60 percent impaired, limited or restricted  Memory Goal Status (): At least 20 percent but less than 40 percent impaired, limited or restricted        Lyric Mills MS CCC-SLP  9/12/2018

## 2018-09-13 ENCOUNTER — HOSPITAL ENCOUNTER (OUTPATIENT)
Dept: OCCUPATIONAL THERAPY | Facility: HOSPITAL | Age: 83
Setting detail: THERAPIES SERIES
Discharge: HOME | End: 2018-09-13

## 2018-09-13 DIAGNOSIS — Z78.9 DECREASED INDEPENDENCE WITH ACTIVITIES OF DAILY LIVING: Primary | ICD-10-CM

## 2018-09-13 DIAGNOSIS — R27.8 DECREASED COORDINATION: ICD-10-CM

## 2018-09-13 DIAGNOSIS — Z74.09 DECREASED FUNCTIONAL MOBILITY AND ENDURANCE: ICD-10-CM

## 2018-09-13 PROCEDURE — 97110 THERAPEUTIC EXERCISES: CPT | Performed by: OCCUPATIONAL THERAPIST

## 2018-09-13 PROCEDURE — 97530 THERAPEUTIC ACTIVITIES: CPT | Performed by: OCCUPATIONAL THERAPIST

## 2018-09-13 NOTE — THERAPY TREATMENT NOTE
Outpatient Occupational Therapy Neuro Treatment Note  [facility]     Patient Name: Dakota Brizuela  : 1930  MRN: [id number]  Today's Date: 2018       Visit Date: 2018    Patient Active Problem List   Diagnosis   • Primary osteoarthritis of right knee   • Hypertension   • GERD (gastroesophageal reflux disease)   • Hyperlipidemia   • PSP (progressive supranuclear palsy) (CMS/HCC)   • Traumatic rhabdomyolysis (CMS/HCC)   • Hyperglycemia   • Leukocytosis   • Low TSH level   • Normocytic anemia   • Elevated troponin   • Hematemesis   • Thyroid disease   • Enteritis   • Acute blood loss anemia due to UGIB   • Ulcer of esophagus with bleeding   • PBA (pseudobulbar affect)   • Dementia associated with other underlying disease with behavioral disturbance        Past Medical History:   Diagnosis Date   • Arthritis    • BPH (benign prostatic hyperplasia)    • Cancer (CMS/HCC)    • GERD (gastroesophageal reflux disease)    • Hyperlipidemia    • Hypertension    • Progressive supranuclear ophthalmoplegia (CMS/HCC)    • Rhabdomyolysis    • Thyroid disease         Past Surgical History:   Procedure Laterality Date   • APPENDECTOMY     • ENDOSCOPY N/A 3/30/2018    Procedure: ESOPHAGOGASTRODUODENOSCOPY;  Surgeon: Mark I Brunner, MD;  Location: [facility] ENDOSCOPY;  Service: Gastroenterology   • PROSTATE SURGERY           Visit Dx:    ICD-10-CM ICD-9-CM   1. Decreased independence with activities of daily living Z65.8 V62.89   2. Decreased functional mobility and endurance Z74.09 780.99   3. Decreased coordination R27.9 781.3                         OT Assessment/Plan     Row Name 18 0900          OT Assessment    Assessment Comments Pt demo fatigue this date with decreased functional mobility and energy for therapy tasks. Pt required increased time for processing cues and functional tasks.   -EM        OT Plan    OT Plan Comments Continue per POC  -EM       User Key  (r) = Recorded By, (t) = Taken By, (c) =  Cosigned By    Initials Name Provider Type    Virginia Marcus OTGAIL Occupational Therapist              Therapy Education  Given: HEP, Posture/body mechanics, Other (comment) (functional mobility)  Program: Reinforced  How Provided: Verbal, Demonstration  Provided to: Patient, Caregiver  Level of Understanding: Verbalized, Demonstrated              OT Exercises     Row Name 09/13/18 0900             Precautions    Existing Precautions/Restrictions fall  -EM         Subjective Comments    Subjective Comments Pt's daugther reports he hasn't slept well  -EM         Subjective Pain    Able to rate subjective pain? yes  -EM      Pre-Treatment Pain Level 0  -EM      Post-Treatment Pain Level 0  -EM         Total Minutes    90753 - OT Therapeutic Exercise Minutes 15  -EM      28885 - OT Therapeutic Activity Minutes 40  -EM         Exercise 1    Exercise Name 1 Energy and strength in UB   -EM      Equipment 1 UE Ergometer  -EM      Time (Minutes) 1 2 mins each way at level 3  -EM         Exercise 2    Exercise Name 2 Pt completed functional mobility with band around B knees to decrease USHA and mod - max A to WS and tactile cues w/ mod-max A to increase step length of R LE. VC to increase posture, decrease freezing and increase step length. Pt demo increased difficulty in tighter spaces and transitional areas.  -EM         Exercise 3    Exercise Name 3 Pt completed UB reaching, catching, tossing activity focusing on bilateral coordination and reflexes with fair- performance.   -EM         Exercise 4    Exercise Name 4 Pt sat on air disc with mod VC for upright posture while completing rows with red theraband  -EM      Sets 4 3  -EM      Reps 4 10  -EM        User Key  (r) = Recorded By, (t) = Taken By, (c) = Cosigned By    Initials Name Provider Type    Virginia Marcus OTR Occupational Therapist                        Time Calculation:   OT Start Time: 0900     Therapy Suggested Charges     Code   Minutes Charges    63547  (CPT®) Hc Ot Neuromusc Re Education Ea 15 Min      47665 (CPT®) Hc Ot Ther Proc Ea 15 Min 15 1    24273 (CPT®) Hc Ot Therapeutic Act Ea 15 Min 40 3    93525 (CPT®) Hc Ot Manual Therapy Ea 15 Min      22170 (CPT®) Hc Ot Iontophoresis Ea 15 Min      70697 (CPT®) Hc Ot Elec Stim Ea-Per 15 Min      53668 (CPT®) Hc Ot Ultrasound Ea 15 Min      38845 (CPT®) Hc Ot Self Care/Mgmt/Train Ea 15 Min      Total  55 4          Therapy Charges for Today     Code Description Service Date Service Provider Modifiers Qty    98450620744 HC OT THER PROC EA 15 MIN 9/13/2018 Virginia Guadalupe OTR GO 1    34859232570 HC OT THERAPEUTIC ACT EA 15 MIN 9/13/2018 Virginia Guadalupe OTR GO 3                    ANN-MARIE Alcantar  9/13/2018

## 2018-10-02 ENCOUNTER — DOCUMENTATION (OUTPATIENT)
Dept: SPEECH THERAPY | Facility: HOSPITAL | Age: 83
End: 2018-10-02

## 2018-10-02 DIAGNOSIS — R41.841 COGNITIVE COMMUNICATION DEFICIT: ICD-10-CM

## 2018-10-02 DIAGNOSIS — R41.89 COGNITIVE DEFICITS: Primary | ICD-10-CM

## 2018-10-02 NOTE — THERAPY DISCHARGE NOTE
"Speech Language Pathology Discharge Summary         Patient Name: Dakota Brizuela  : 1930  MRN: [id number]    Today's Date: 10/2/2018            SLP OP Goals     Row Name 10/02/18 0858          Goal Type Needed    Goal Type Needed Other Adult Goals  -GUDELIA        Subjective Comments    Subjective Comments Pt daughter called to cancel all future appointments. discharge speech therapy  -GUDELIA        Dysarthria Goals    Patient will be able to engage in speech at the conversational level with maximum articulatory accuracy so that speech is understood by familiar /unfamiliar listeners 90%:  -GUDELIA     Status: Patient will be able to engage in speech at the conversational level with maximum articulatory accuracy so that speech is understood by familiar /unfamiliar listeners Discontinued   not met  -GUDELIA     Comments: Patient will be able to engage in speech at the conversational level with maximum articulatory accuracy so that speech is understood by familiar /unfamiliar listeners initiated goal, gave patient strategies for overarticulation and increasing breath support for increased intelligibility.  -GUDELIA     Patient will apply compensatory strategies to improve intelligibility of speech during in spontaneous speech 80%:  -GUDELIA     Status: Patient will apply compensatory strategies to improve intelligibility of speech during in spontaneous speech Discontinued   not met  -GUDELIA     Comments: Patient will apply compensatory strategies to improve intelligibility of speech during in spontaneous speech 2018: pt using good breath support for increased intelligibility today. 2018: reviewed strategies for increased intelligibility. gave strategies; patient demonstrated good use in structured tasks  -GUDELIA     \"Patient will increase strength and power  of articulators so they can move more quickly and accurately to improve speech intelligibility by completing lip exercises\" 80%:  -GUDELIA     Status: Patient will increase strength and " power  of articulators so they can move more quickly and accurately to improve speech intelligibility by completing lip exercises Progressing as expected  -GUDELIA     Comments: Patient will increase strength and power  of articulators so they can move more quickly and accurately to improve speech intelligibility by completing lip exercises 9/12/2018: articulatory agility improving. 9/5/2018: 8/10, pt has diffiuclty with coordinating breath support and speech. 8/28/2018: multisyllabic word drills using intelligibility   -GUDELIA        Memory Goals    Memory LTG's Patient will be able to remember  information needed to participate in activities of daily living;Patient and family will implement compensatory strategies to maximize patient’s Memory function so patient can continue to participate in daily activities  -GUDELIA     Patient and family will implement compensatory strategies to maximize patient’s Memory function so patient can continue to participate in daily activities 90%:  -GUDELIA     Status: Patient and family will implement compensatory strategies to maximize patient’s Memory function so patient can continue to participate in daily activities Discontinued   goal not met  -GUDELIA     Comments: Patient and family will implement compensatory strategies to maximize patient’s Memory function so patient can continue to participate in daily activities 9/12/2018: gave handout and reviewed strategies for home use of memory and communicaiton.  9/5/2018: pt is not asking for things at home. discussed increasing independence with adls by participating using strategies, keeping a calendar to know when appointments are.   -GUDELIA     Patient will be able to remember  information needed to participate in activities of daily living 80%  -GUDELIA     Status: Patient will be able to remember  information needed to participate in activities of daily living Discontinued   goal not met  -GUDELIA     Comments: Patient will be able to remember  information  needed to participate in activities of daily living 9/12/2018: recalled sequence for adls including toothbrushing with prompts .9/5/2018: discussed strategies for recall with patient including reciting, performing only one task at a time and stating the tasks; pt has diffiuclty with motor planning  activities especially when interupted with someone talking. limit distractions when pt is performing adls. 8/28/2018: initiated some strategies for recall and shared with pt daughter  -GUDELIA     Memory STG's Patient will demonstrate improved ability to recall information by immediately recalling a series of words;Patient’s memory skills will be enhanced as reported by patient by using external memory aides;Patient will demonstrate improved ability to recall information by stating activities patient has completed that day  -GUDELIA     Patient will demonstrate improved ability to recall information by immediately recalling a series of words 90%:;with cues;with intermittent cues  -GUDELIA     Status: Patient will demonstrate improved ability to recall information by immediately recalling a series of words Discontinued   goal not met  -GUDELIA     Comments: Patient will demonstrate improved ability to recall information by immediately recalling a series of words 9/12/2018 categories immediate recall requires prompts for 75% 9/5/2018: 3/3after 3 minutes; 2/3; 3/3 with cues.  /3 after 5 minutes.  8/28/2018: immediate recall of 3 words 100% unrelated words; 4 words related-5/6; unrelated difficult, 8/21/2018:3 words immediate recall 5/7; 4 words 2/4;   -GUDELIA     Patient’s memory skills will be enhanced as reported by patient by using external memory aides 100%:  -GUDELIA     Status: Patient’s memory skills will be enhanced as reported by patient by using external memory aides Discontinued   goal not met  -GUDELIA     Comments: Patient’s memory skills will be enhanced as reported by patient by using external memory aides 9/12/2018: memory strategies  including calendar and visual aids discussed and utilized with 80% sucess. 8/28/2018: external memory aids introduced including rehearsal and visual cues. improving memory of words by 25%.8/21/2018: not addressed today   -GUDELIA     Patient will demonstrate improved ability to recall information by stating activities patient has completed that day 80%:  -GUDELIA     Status: Patient will demonstrate improved ability to recall information by stating activities patient has completed that day Discontinued   goal not met  -GUDELIA     Comments: Patient will demonstrate improved ability to recall information by stating activities patient has completed that day 9/12/2018: 8/28/2018: recalling 3 events of the day with  cues required 2/3 and 3/3 with more prompts needed. 8/21/2018: recalling 3 events of the day with 90% min cues.  -GUDELIA        Other Goals    Other Adult Goal- 1 Continue/complete testing to determine accurate level of deficits and establish/revise goals if needed.  -GUDELIA     Status: Other Adult Goal- 1 Achieved  -GUDELIA     Comments: Other Adult Goal- 1 8/21/2018: completed testing. addressed results and deficits with patient.   -GUDELIA     Other Adult Goal- 2 Pt will answer questions after reading a 36-50 word paragraph with 80% accuracy  -GUDELIA     Status: Other Adult Goal- 2 Discontinued   goal not met  -GUDELIA     Comments: Other Adult Goal- 2 9/12/2018: paragraphs using [facility] news 3/3, 3/3 2/3. 8/28/2018: 3/5 correct; 4/5;min cues needed  -GUDELIA     Other Adult Goal- 3 Pt will complete temporal and multifactoral problem solving tasks with cues as needed to perform correctly 80% of the time.   -GUDELIA     Status: Other Adult Goal- 3 Discontinued   goal not met  -GUDELIA     Comments: Other Adult Goal- 3 9/5/2018: requires mod max cues for 2/4. 8/28/2018: introduced new goal today.  -GUDELIA        SLP Time Calculation    SLP Goal Re-Cert Due Date 11/11/18  -GUDELIA       User Key  (r) = Recorded By, (t) = Taken By, (c) = Cosigned By    Initials Name  Provider Type    Lyric Shah MS CCC-SLP Speech and Language Pathologist          OP SLP Discharge Summary  Date of Discharge: 09/18/18  Reason for Discharge: patient/family request discontinuation of services  Progress Toward Achieving Short/long Term Goals: other (see comments) (pt stopped attending therapy per family request.)  Discharge Instructions: recommend re-evaluate as indicated when patient and family are able.      Time Calculation:                    Lyric Mills MS CCC-SLP  10/2/2018

## 2018-10-04 ENCOUNTER — APPOINTMENT (OUTPATIENT)
Dept: OCCUPATIONAL THERAPY | Facility: HOSPITAL | Age: 83
End: 2018-10-04

## 2018-10-11 ENCOUNTER — OFFICE VISIT (OUTPATIENT)
Dept: NEUROLOGY | Facility: CLINIC | Age: 83
End: 2018-10-11

## 2018-10-11 VITALS
WEIGHT: 165 LBS | OXYGEN SATURATION: 98 % | DIASTOLIC BLOOD PRESSURE: 68 MMHG | HEART RATE: 60 BPM | BODY MASS INDEX: 24.44 KG/M2 | SYSTOLIC BLOOD PRESSURE: 124 MMHG | RESPIRATION RATE: 18 BRPM | HEIGHT: 69 IN

## 2018-10-11 DIAGNOSIS — F48.2 PBA (PSEUDOBULBAR AFFECT): ICD-10-CM

## 2018-10-11 DIAGNOSIS — G23.1 PSP (PROGRESSIVE SUPRANUCLEAR PALSY) (HCC): Primary | ICD-10-CM

## 2018-10-11 DIAGNOSIS — F02.818 DEMENTIA ASSOCIATED WITH OTHER UNDERLYING DISEASE WITH BEHAVIORAL DISTURBANCE (HCC): ICD-10-CM

## 2018-10-11 PROCEDURE — 99214 OFFICE O/P EST MOD 30 MIN: CPT | Performed by: PSYCHIATRY & NEUROLOGY

## 2018-10-11 RX ORDER — MIRTAZAPINE 15 MG/1
15 TABLET, FILM COATED ORAL NIGHTLY
Qty: 30 TABLET | Refills: 11 | Status: SHIPPED | OUTPATIENT
Start: 2018-10-11 | End: 2018-12-06 | Stop reason: SDUPTHER

## 2018-10-11 RX ORDER — DONEPEZIL HYDROCHLORIDE 5 MG/1
5 TABLET, FILM COATED ORAL NIGHTLY
Qty: 30 TABLET | Refills: 2 | Status: SHIPPED | OUTPATIENT
Start: 2018-10-11 | End: 2019-01-09 | Stop reason: SDUPTHER

## 2018-10-11 NOTE — PROGRESS NOTES
Subjective   Patient ID: Dakota Brizuela is a 88 y.o. male     Chief Complaint   Patient presents with   • PSP        History of Present Illness    88 y.o. male returns in follow up for Parkinsonism/PSP, dementia and PBA.  Last visit on 9/11/18 started Sinemet and Nuedexta    PSP    Tolerating sinemet but not improvement in movements.  Up several times a night getting dressed.      Dementia     Decreased ST memory and confusion to days and nights.  Refusing to do therapy.     PBA     Started on Nuedexta twice a day but disrupted sleep.  Decrease to once a day but little improvement.     Problem history:    Dx with PSP by Dr Colin 2015.      Presented with frequent falls and trouble speaking  Stopped driving a year.  Playing bridge up to a year ago.  Started using a walker 2017.      ST memory started declining a year ago.      Living at home with Dtr.  Dtr started making all meals 18 months.  Cannot use coffee pot, or microwave.  Able to dress himself.  Assistant comes in to do bathing twice a week.  Can brush his teeth.      Steps are short shuffling, festinating gait and freezing.      Occasional anger outbursts.      Current sx of emotional lability of laughing and crying.      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  [facility] R hemicolectomy partial small bowel resection    Stopped playing golf 2014 and started using walking stick.  Treated for OA of knees Ortho    3/20/18 fell and remained on floor for 12 hours.  Noted to have increasing confusion.  Traumatic rhabdo.  CXR bilat infitrates tx with Zosyn.  Speech eval recs aspiration precautions.  Discharged to [facility].     HCT, my review of films, moderate atrophy     Admitted 3/30/18 - 4/1/18 for hematemesis.    Past Medical History:   Diagnosis Date   • Arthritis    • BPH (benign prostatic hyperplasia)    • Cancer (CMS/HCC)    • GERD (gastroesophageal reflux disease)    •  Hyperlipidemia    • Hypertension    • Progressive supranuclear ophthalmoplegia (CMS/HCC)    • Rhabdomyolysis    • Thyroid disease      Family History   Problem Relation Age of Onset   • No Known Problems Mother    • No Known Problems Father    • Heart attack Brother      Social History     Social History   • Marital status:      Social History Main Topics   • Smoking status: Never Smoker   • Smokeless tobacco: Never Used   • Alcohol use Yes      Comment: 1/2 glass red wine    • Drug use: No   • Sexual activity: Defer     Other Topics Concern   • Not on file       Review of Systems   Constitutional: Positive for fatigue. Negative for activity change and unexpected weight change.   HENT: Positive for trouble swallowing. Negative for facial swelling, hearing loss, tinnitus and voice change.    Eyes: Positive for visual disturbance. Negative for photophobia and pain.   Respiratory: Negative for apnea, cough and choking.    Cardiovascular: Negative for chest pain.   Gastrointestinal: Negative for constipation, nausea and vomiting.   Endocrine: Negative for cold intolerance.   Genitourinary: Negative for difficulty urinating, frequency and urgency.   Musculoskeletal: Positive for gait problem. Negative for arthralgias, back pain, myalgias, neck pain and neck stiffness.   Skin: Negative for rash.   Allergic/Immunologic: Negative for immunocompromised state.   Neurological: Positive for weakness. Negative for dizziness, tremors, seizures, syncope, facial asymmetry, speech difficulty, light-headedness, numbness and headaches.   Hematological: Negative for adenopathy.   Psychiatric/Behavioral: Positive for confusion and decreased concentration. Negative for hallucinations and sleep disturbance. The patient is not nervous/anxious.        Objective     Vitals:    10/11/18 0908   BP: 124/68   BP Location: Left arm   Patient Position: Sitting   Cuff Size: Adult   Pulse: 60   Resp: 18   SpO2: 98%   Weight: 74.8 kg (165 lb)  "  Height: 175.3 cm (69\")       Neurologic Exam     Mental Status   Oriented to person.   Disoriented to place. Disoriented to street and number. Oriented to country.   Oriented to time. Disoriented to date. Oriented to year, month, day and season.   Registration: recalls 3 of 3 objects. Recall at 5 minutes: recalls 3 of 3 objects. Follows 1 step commands.   Attention: decreased. Concentration: decreased.   Level of consciousness: alert  Knowledge: poor and inconsistent with education. Unable to perform simple calculations.   Able to name object. Unable to read. Unable to repeat. Unable to write. Abnormal comprehension.     Cranial Nerves     CN II   Visual fields full to confrontation.   Visual acuity: normal  Right visual field deficit: none  Left visual field deficit: none     CN III, IV, VI   Right pupil: Shape: regular. Reactivity: brisk. Consensual response: intact.   Left pupil: Shape: regular. Reactivity: brisk. Consensual response: intact.   Nystagmus: none   Diplopia: none  Ophthalmoparesis: none  Upgaze: abnormal  Downgaze: abnormal  Conjugate gaze: present  Vestibulo-ocular reflex: absent    CN V   Facial sensation intact.   Right corneal reflex: normal  Left corneal reflex: normal    CN VII   Right facial weakness: central  Left facial weakness: central    CN VIII   Hearing: intact    CN IX, X   Palate: symmetric  Right gag reflex: normal  Left gag reflex: normal    CN XI   Right sternocleidomastoid strength: normal  Left sternocleidomastoid strength: normal    CN XII   Tongue: not atrophic  Fasciculations: absent  Tongue deviation: none    Motor Exam   Muscle bulk: normal  Overall muscle tone: increased  Right arm tone: increased  Left arm tone: increased  Right leg tone: increased  Left leg tone: increased    Strength   Right neck flexion: 4/5  Left neck flexion: 4/5  Right neck extension: 4/5  Left neck extension: 4/5  Right deltoid: 4/5  Left deltoid: 4/5  Right biceps: 4/5  Left biceps: 4/5  Right " triceps: 4/5  Left triceps: 4/5  Right wrist flexion: 4/5  Left wrist flexion: 4/5  Right wrist extension: 4/5  Left wrist extension: 4/5  Right interossei: 4/5  Left interossei: 4/5  Right abdominals: 4/5  Left abdominals: 4/5  Right iliopsoas: 4/5  Left iliopsoas: 4/5  Right quadriceps: 4/5  Left quadriceps: 4/5  Right hamstrin/5  Left hamstrin/5  Right glutei: 4/5  Left glutei: 4/5  Right anterior tibial: 4/5  Left anterior tibial: 4/5  Right posterior tibial: 4/5  Left posterior tibial: 4/5  Right peroneal: 4/5  Left peroneal: 4/5  Right gastroc: 4/5  Left gastroc: 4/5    Sensory Exam   Light touch normal.   Vibration normal.   Proprioception normal.   Pinprick normal.     Gait, Coordination, and Reflexes     Gait  Gait: shuffling and wide-based    Tremor   Resting tremor: absent  Intention tremor: absent  Action tremor: absent    Reflexes   Reflexes 2+ except as noted.       Physical Exam   Constitutional: He appears well-developed and well-nourished.   Nursing note and vitals reviewed.      Admission on 2018, Discharged on 2018   Component Date Value Ref Range Status   • Glucose 2018 160* 70 - 100 mg/dL Final   • BUN 2018 25* 9 - 23 mg/dL Final   • Creatinine 2018 0.90  0.60 - 1.30 mg/dL Final   • Sodium 2018 140  132 - 146 mmol/L Final   • Potassium 2018 3.9  3.5 - 5.5 mmol/L Final   • Chloride 2018 103  99 - 109 mmol/L Final   • CO2 2018 24.0  20.0 - 31.0 mmol/L Final   • Calcium 2018 8.8  8.7 - 10.4 mg/dL Final   • Total Protein 2018 6.5  5.7 - 8.2 g/dL Final   • Albumin 2018 3.90  3.20 - 4.80 g/dL Final   • ALT (SGPT) 2018 52* 7 - 40 U/L Final   • AST (SGOT) 2018 28  0 - 33 U/L Final   • Alkaline Phosphatase 2018 59  25 - 100 U/L Final   • Total Bilirubin 2018 0.5  0.3 - 1.2 mg/dL Final   • eGFR Non African Amer 2018 80  >60 mL/min/1.73 Final   • Globulin 2018 2.6  gm/dL Final   • A/G Ratio  03/30/2018 1.5  1.5 - 2.5 g/dL Final   • BUN/Creatinine Ratio 03/30/2018 27.8* 7.0 - 25.0 Final   • Anion Gap 03/30/2018 13.0* 3.0 - 11.0 mmol/L Final   • Lipase 03/30/2018 27  6 - 51 U/L Final   • Fecal Occult Blood 03/30/2018 Positive* Negative Final   • Lot Number 03/30/2018 [id number]   Final   • Expiration Date 03/30/2018 5-20   Final   • DEVELOPER LOT NUMBER 03/30/2018 [id number]   Final   • DEVELOPER EXPIRATION DATE 03/30/2018 2020-2   Final   • Positive Control 03/30/2018 Positive  Positive Final   • Negative Control 03/30/2018 Negative  Negative Final   • Lactate 03/30/2018 2.1* 0.5 - 2.0 mmol/L Final    Falsely depressed results may occur on samples drawn from patients receiving N-Acetylcysteine (NAC) or Metamizole.   • WBC 03/30/2018 10.49  3.50 - 10.80 10*3/mm3 Final   • RBC 03/30/2018 3.88* 4.20 - 5.76 10*6/mm3 Final   • Hemoglobin 03/30/2018 11.7* 13.1 - 17.5 g/dL Final   • Hematocrit 03/30/2018 36.7* 38.9 - 50.9 % Final   • MCV 03/30/2018 94.6  80.0 - 99.0 fL Final   • MCH 03/30/2018 30.2  27.0 - 31.0 pg Final   • MCHC 03/30/2018 31.9* 32.0 - 36.0 g/dL Final   • RDW 03/30/2018 13.4  11.3 - 14.5 % Final   • RDW-SD 03/30/2018 46.3  37.0 - 54.0 fl Final   • MPV 03/30/2018 10.4  6.0 - 12.0 fL Final   • Platelets 03/30/2018 330  150 - 450 10*3/mm3 Final   • Neutrophil % 03/30/2018 91.9* 41.0 - 71.0 % Final   • Lymphocyte % 03/30/2018 2.5* 24.0 - 44.0 % Final   • Monocyte % 03/30/2018 4.6  0.0 - 12.0 % Final   • Eosinophil % 03/30/2018 0.5  0.0 - 3.0 % Final   • Basophil % 03/30/2018 0.1  0.0 - 1.0 % Final   • Immature Grans % 03/30/2018 0.4  0.0 - 0.6 % Final   • Neutrophils, Absolute 03/30/2018 9.65* 1.50 - 8.30 10*3/mm3 Final   • Lymphocytes, Absolute 03/30/2018 0.26* 0.60 - 4.80 10*3/mm3 Final   • Monocytes, Absolute 03/30/2018 0.48  0.00 - 1.00 10*3/mm3 Final   • Eosinophils, Absolute 03/30/2018 0.05  0.00 - 0.30 10*3/mm3 Final   • Basophils, Absolute 03/30/2018 0.01  0.00 - 0.20 10*3/mm3 Final   • Immature  Grans, Absolute 03/30/2018 0.04* 0.00 - 0.03 10*3/mm3 Final   • Extra Tube 03/30/2018 Hold for add-ons.   Final    Auto resulted.   • Lactate 03/30/2018 1.7  0.5 - 2.0 mmol/L Final    Falsely depressed results may occur on samples drawn from patients receiving N-Acetylcysteine (NAC) or Metamizole.   • Color, UA 03/31/2018 Yellow  Yellow, Straw Final   • Appearance, UA 03/31/2018 Clear  Clear Final   • pH, UA 03/31/2018 7.0  5.0 - 8.0 Final   • Specific Gravity, UA 03/31/2018 1.026  1.001 - 1.030 Final   • Glucose, UA 03/31/2018 Negative  Negative Final   • Ketones, UA 03/31/2018 Negative  Negative Final   • Bilirubin, UA 03/31/2018 Negative  Negative Final   • Blood, UA 03/31/2018 Negative  Negative Final   • Protein, UA 03/31/2018 Negative  Negative Final   • Leuk Esterase, UA 03/31/2018 Negative  Negative Final   • Nitrite, UA 03/31/2018 Negative  Negative Final   • Urobilinogen, UA 03/31/2018 1.0 E.U./dL  0.2 - 1.0 E.U./dL Final   • WBC 03/31/2018 6.87  3.50 - 10.80 10*3/mm3 Final   • RBC 03/31/2018 3.27* 4.20 - 5.76 10*6/mm3 Final   • Hemoglobin 03/31/2018 9.9* 13.1 - 17.5 g/dL Final   • Hematocrit 03/31/2018 32.0* 38.9 - 50.9 % Final   • MCV 03/31/2018 97.9  80.0 - 99.0 fL Final   • MCH 03/31/2018 30.3  27.0 - 31.0 pg Final   • MCHC 03/31/2018 30.9* 32.0 - 36.0 g/dL Final   • RDW 03/31/2018 13.7  11.3 - 14.5 % Final   • RDW-SD 03/31/2018 48.9  37.0 - 54.0 fl Final   • MPV 03/31/2018 9.4  6.0 - 12.0 fL Final   • Platelets 03/31/2018 262  150 - 450 10*3/mm3 Final   • Glucose 03/31/2018 92  70 - 100 mg/dL Final   • BUN 03/31/2018 18  9 - 23 mg/dL Final   • Creatinine 03/31/2018 1.00  0.60 - 1.30 mg/dL Final   • Sodium 03/31/2018 139  132 - 146 mmol/L Final   • Potassium 03/31/2018 3.6  3.5 - 5.5 mmol/L Final   • Chloride 03/31/2018 111* 99 - 109 mmol/L Final   • CO2 03/31/2018 23.0  20.0 - 31.0 mmol/L Final   • Calcium 03/31/2018 7.7* 8.7 - 10.4 mg/dL Final   • eGFR Non African Amer 03/31/2018 71  >60 mL/min/1.73  Final   • BUN/Creatinine Ratio 03/31/2018 18.0  7.0 - 25.0 Final   • Anion Gap 03/31/2018 5.0  3.0 - 11.0 mmol/L Final   • Hemoglobin 04/01/2018 11.1* 13.1 - 17.5 g/dL Final   • Hematocrit 04/01/2018 34.5* 38.9 - 50.9 % Final         Assessment/Plan     Problem List Items Addressed This Visit        Nervous and Auditory    PSP (progressive supranuclear palsy) (CMS/HCC) - Primary    Current Assessment & Plan     No improvement on Sinemet    D/C sinemet     Start Aricept and Remeron          Dementia associated with other underlying disease with behavioral disturbance    Current Assessment & Plan     Psychological condition is worsening.  Medication changes per orders.  Psychological condition  will be reassessed at the next regular appointment.         Relevant Medications    mirtazapine (REMERON) 15 MG tablet    donepezil (ARICEPT) 5 MG tablet       Other    PBA (pseudobulbar affect)    Current Assessment & Plan     Stop Nuedexat due to lack of efficacy     Start Remeron 15 mg qhs                   No Follow-up on file.

## 2018-10-11 NOTE — ASSESSMENT & PLAN NOTE
H&P and face sheet faxed to [phone] and emailed to Nuno Morrison for final placement. Psychological condition is worsening.  Medication changes per orders.  Psychological condition  will be reassessed at the next regular appointment.

## 2018-10-25 ENCOUNTER — DOCUMENTATION (OUTPATIENT)
Dept: PHYSICAL THERAPY | Facility: HOSPITAL | Age: 83
End: 2018-10-25

## 2018-10-25 DIAGNOSIS — G23.1 PSP (PROGRESSIVE SUPRANUCLEAR PALSY) (HCC): Primary | ICD-10-CM

## 2018-10-25 DIAGNOSIS — Z74.09 IMPAIRED FUNCTIONAL MOBILITY, BALANCE, GAIT, AND ENDURANCE: ICD-10-CM

## 2018-10-25 NOTE — THERAPY DISCHARGE NOTE
Outpatient Physical Therapy Discharge Summary         Patient Name: Dakota Brizuela  : 1930  MRN: [id number]    Today's Date: 10/25/2018    Visit Dx:    ICD-10-CM ICD-9-CM   1. PSP (progressive supranuclear palsy) (CMS/[facility]) G23.1 333.0   2. Impaired functional mobility, balance, gait, and endurance Z74.09 V49.89             PT OP Goals     Row Name 10/25/18 1400          PT Short Term Goals    STG Date to Achieve 18  -KL     STG 1 Patient and caregiver will report compliance with HEP.   -KL     STG 1 Progress Met  -KL     STG 2 Patient to improve LOWERY balance score to >/= 21/56 to decrease client's risk of falls.  -KL     STG 2 Progress Not Met  -KL     STG 3 Patient to perform TUG within 1.5 minutes without LOB for improved functional mobility.  -KL     STG 3 Progress Not Met  -KL        Long Term Goals    LTG Date to Achieve 10/31/18  -KL     LTG 1 Patient and caregiver will be I with HEP.   -KL     LTG 1 Progress Not Met  -KL     LTG 2 Patient to improve LOWERY balance score to >/= 23/56 to decrease client's risk of falls.  -KL     LTG 2 Progress Not Met  -KL     LTG 3 Patient to perform TUG within 1 minute without LOB for improved functional mobility.  -KL     LTG 3 Progress Not Met  -KL       User Key  (r) = Recorded By, (t) = Taken By, (c) = Cosigned By    Initials Name Provider Type    Liz Hinton PT Physical Therapist          OP PT Discharge Summary  Date of Discharge: 10/25/18  Reason for Discharge: Patient/Caregiver request  Outcomes Achieved: Unable to make functional progress toward goals at this time  Discharge Destination: Home with home program  Discharge Instructions/Additional Comments: Patient's daughter requested discharge.       Time Calculation:        Therapy Suggested Charges     Code   Minutes Charges    None                       Liz Urbina PT  10/25/2018

## 2018-11-05 ENCOUNTER — DOCUMENTATION (OUTPATIENT)
Dept: OCCUPATIONAL THERAPY | Facility: HOSPITAL | Age: 83
End: 2018-11-05

## 2018-11-05 DIAGNOSIS — Z78.9 DECREASED INDEPENDENCE WITH ACTIVITIES OF DAILY LIVING: Primary | ICD-10-CM

## 2018-11-05 DIAGNOSIS — Z74.09 DECREASED FUNCTIONAL MOBILITY AND ENDURANCE: ICD-10-CM

## 2018-11-05 DIAGNOSIS — R27.8 DECREASED COORDINATION: ICD-10-CM

## 2018-11-05 NOTE — THERAPY DISCHARGE NOTE
Outpatient Occupational Therapy Discharge Summary         Patient Name: Dakota Brizuela  : 1930  MRN: [id number]    Today's Date: 2018      Visit Date: 2018            OT Goals     Row Name 18 1026          OT Short Term Goals    STG Date to Achieve 18  -EM     STG 1 Pt will stand at sink and complete all grooming tasks with min A  -EM     STG 1 Progress Not Met  -EM     STG 2 Patient will participate in [facility] leisure activity to increase functional use of hands while standing for 10 mins.  -EM     STG 2 Progress Not Met  -EM     STG 3 Pt and caregiver will be educated in HEP to increase FMC and standing balance for ADL tasks  -EM     STG 3 Progress Not Met  -EM        Long Term Goals    LTG Date to Achieve 18  -EM     LTG 1 Pt and caregiver will be independent with HEPs  -EM     LTG 1 Progress Not Met  -EM     LTG 2 Patient will stand at sink and complete all grooming tasks with SBA  -EM     LTG 2 Progress Not Met  -EM     LTG 3 Patient will participate in FM leisure activity to increase functional use of hand while standing for 15 mins  -EM     LTG 3 Progress Not Met  -EM       User Key  (r) = Recorded By, (t) = Taken By, (c) = Cosigned By    Initials Name Provider Type    Virginia Marcus OTR Occupational Therapist           OP OT Discharge Summary  Date of Discharge: 18  Reason for Discharge: Patient/Caregiver request  Discharge Destination: Home with caregiver assist      Time Calculation:         Therapy Suggested Charges     Code   Minutes Charges    None                             ANN-MARIE Alcantar   2018

## 2018-12-06 ENCOUNTER — OFFICE VISIT (OUTPATIENT)
Dept: NEUROLOGY | Facility: CLINIC | Age: 83
End: 2018-12-06

## 2018-12-06 VITALS
HEIGHT: 69 IN | OXYGEN SATURATION: 99 % | RESPIRATION RATE: 16 BRPM | SYSTOLIC BLOOD PRESSURE: 108 MMHG | HEART RATE: 64 BPM | WEIGHT: 165 LBS | BODY MASS INDEX: 24.44 KG/M2 | DIASTOLIC BLOOD PRESSURE: 68 MMHG

## 2018-12-06 DIAGNOSIS — F02.818 DEMENTIA ASSOCIATED WITH OTHER UNDERLYING DISEASE WITH BEHAVIORAL DISTURBANCE (HCC): ICD-10-CM

## 2018-12-06 DIAGNOSIS — G23.1 PSP (PROGRESSIVE SUPRANUCLEAR PALSY) (HCC): Primary | ICD-10-CM

## 2018-12-06 DIAGNOSIS — F48.2 PBA (PSEUDOBULBAR AFFECT): ICD-10-CM

## 2018-12-06 PROCEDURE — 99214 OFFICE O/P EST MOD 30 MIN: CPT | Performed by: PSYCHIATRY & NEUROLOGY

## 2018-12-06 RX ORDER — MIRTAZAPINE 30 MG/1
30 TABLET, FILM COATED ORAL NIGHTLY
Qty: 30 TABLET | Refills: 11 | Status: SHIPPED | OUTPATIENT
Start: 2018-12-06 | End: 2019-12-06

## 2018-12-06 NOTE — PROGRESS NOTES
Subjective   Patient ID: Dakota Brizuela is a 88 y.o. male     Chief Complaint   Patient presents with   • Progressice Supranuclear Palsy   • Fall     Falling several times since he was seen in the office.         History of Present Illness    88 y.o. male returns in follow up for Parkinsonism/PSP, dementia and PBA.  Last visit on 10/11/18 stopped Sinemet and Nuedexta, added Aricept and Remeron    PSP    Sx stable unresponsive to Sinemet.    Dementia     Addition of Aricept increased vivid dreaming.  Acting out dreams.      Dtr notes improved ST memory.      PBA     Sleeping improved on Remeron.      Problem history:    Dx with PSP by Dr Colin 2015.      Presented with frequent falls and trouble speaking  Stopped driving a year.  Playing bridge up to a year ago.  Started using a walker 2017.      ST memory started declining a year ago.      Living at home with Dtr.  Dtr started making all meals 18 months.  Cannot use coffee pot, or microwave.  Able to dress himself.  Assistant comes in to do bathing twice a week.  Can brush his teeth.      Steps are short shuffling, festinating gait and freezing.      Occasional anger outbursts.      Current sx of emotional lability of laughing and crying.      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  [facility] R hemicolectomy partial small bowel resection    Stopped playing golf 2014 and started using walking stick.  Treated for OA of knees Ortho    3/20/18 fell and remained on floor for 12 hours.  Noted to have increasing confusion.  Traumatic rhabdo.  CXR bilat infitrates tx with Zosyn.  Speech eval recs aspiration precautions.  Discharged to [facility], [place].     HCT, my review of films, moderate atrophy     Admitted 3/30/18 - 4/1/18 for hematemesis.    Past Medical History:   Diagnosis Date   • Arthritis    • BPH (benign prostatic hyperplasia)    • Cancer (CMS/HCC)    • GERD (gastroesophageal reflux disease)    •  Hyperlipidemia    • Hypertension    • Progressive supranuclear ophthalmoplegia (CMS/HCC)    • Rhabdomyolysis    • Thyroid disease      Family History   Problem Relation Age of Onset   • No Known Problems Mother    • No Known Problems Father    • Heart attack Brother      Social History     Socioeconomic History   • Marital status:      Spouse name: Not on file   • Number of children: Not on file   • Years of education: Not on file   • Highest education level: Not on file   Tobacco Use   • Smoking status: Never Smoker   • Smokeless tobacco: Never Used   Substance and Sexual Activity   • Alcohol use: Yes     Comment: 1/2 glass red wine    • Drug use: No   • Sexual activity: Defer       Review of Systems   Constitutional: Positive for fatigue. Negative for activity change and unexpected weight change.   HENT: Positive for trouble swallowing. Negative for facial swelling, hearing loss, tinnitus and voice change.    Eyes: Positive for visual disturbance. Negative for photophobia and pain.   Respiratory: Negative for apnea, cough and choking.    Cardiovascular: Negative for chest pain.   Gastrointestinal: Negative for constipation, nausea and vomiting.   Endocrine: Negative for cold intolerance.   Genitourinary: Negative for difficulty urinating, frequency and urgency.   Musculoskeletal: Positive for gait problem. Negative for arthralgias, back pain, myalgias, neck pain and neck stiffness.   Skin: Negative for rash.   Allergic/Immunologic: Negative for immunocompromised state.   Neurological: Positive for weakness. Negative for dizziness, tremors, seizures, syncope, facial asymmetry, speech difficulty, light-headedness, numbness and headaches.   Hematological: Negative for adenopathy.   Psychiatric/Behavioral: Positive for confusion and decreased concentration. Negative for hallucinations and sleep disturbance. The patient is not nervous/anxious.        Objective     Vitals:    12/06/18 0945   BP: 108/68   BP  "Location: Left arm   Patient Position: Sitting   Cuff Size: Adult   Pulse: 64   Resp: 16   SpO2: 99%   Weight: 74.8 kg (165 lb)   Height: 175.3 cm (69\")       Neurologic Exam     Mental Status   Oriented to person.   Disoriented to place. Disoriented to street and number. Oriented to country.   Oriented to time. Disoriented to date. Oriented to year, month, day and season.   Registration: recalls 3 of 3 objects. Recall at 5 minutes: recalls 3 of 3 objects. Follows 1 step commands.   Attention: decreased. Concentration: decreased.   Level of consciousness: alert  Knowledge: poor and inconsistent with education. Unable to perform simple calculations.   Able to name object. Unable to read. Unable to repeat. Unable to write. Abnormal comprehension.     Cranial Nerves     CN II   Visual fields full to confrontation.   Visual acuity: normal  Right visual field deficit: none  Left visual field deficit: none     CN III, IV, VI   Right pupil: Shape: regular. Reactivity: brisk. Consensual response: intact.   Left pupil: Shape: regular. Reactivity: brisk. Consensual response: intact.   Nystagmus: none   Diplopia: none  Ophthalmoparesis: none  Upgaze: abnormal  Downgaze: abnormal  Conjugate gaze: present  Vestibulo-ocular reflex: absent    CN V   Facial sensation intact.   Right corneal reflex: normal  Left corneal reflex: normal    CN VII   Right facial weakness: central  Left facial weakness: central    CN VIII   Hearing: intact    CN IX, X   Palate: symmetric  Right gag reflex: normal  Left gag reflex: normal    CN XI   Right sternocleidomastoid strength: normal  Left sternocleidomastoid strength: normal    CN XII   Tongue: not atrophic  Fasciculations: absent  Tongue deviation: none    Motor Exam   Muscle bulk: normal  Overall muscle tone: increased  Right arm tone: increased  Left arm tone: increased  Right leg tone: increased  Left leg tone: increased    Strength   Right neck flexion: 4/5  Left neck flexion: 4/5  Right " neck extension: 4/5  Left neck extension: 4/5  Right deltoid: 4/5  Left deltoid: 4/5  Right biceps: 4/5  Left biceps: 4/5  Right triceps: 4/5  Left triceps: 4/5  Right wrist flexion: 4/5  Left wrist flexion: 4/5  Right wrist extension: 4/5  Left wrist extension: 4/5  Right interossei: 4/5  Left interossei: 4/5  Right abdominals: 4/5  Left abdominals: 4/5  Right iliopsoas: 4/5  Left iliopsoas: 4/5  Right quadriceps: 4/5  Left quadriceps: 4/5  Right hamstrin/5  Left hamstrin/5  Right glutei: 4/5  Left glutei: 4/5  Right anterior tibial: 4/5  Left anterior tibial: 4/5  Right posterior tibial: 4/5  Left posterior tibial: 4/5  Right peroneal: 4/5  Left peroneal: 4/5  Right gastroc: 4/5  Left gastroc: 4/5    Sensory Exam   Light touch normal.   Vibration normal.   Proprioception normal.   Pinprick normal.     Gait, Coordination, and Reflexes     Gait  Gait: shuffling and wide-based    Tremor   Resting tremor: absent  Intention tremor: absent  Action tremor: absent    Reflexes   Reflexes 2+ except as noted.       Physical Exam   Constitutional: He appears well-developed and well-nourished.   Nursing note and vitals reviewed.      Admission on 2018, Discharged on 2018   Component Date Value Ref Range Status   • Glucose 2018 160* 70 - 100 mg/dL Final   • BUN 2018 25* 9 - 23 mg/dL Final   • Creatinine 2018 0.90  0.60 - 1.30 mg/dL Final   • Sodium 2018 140  132 - 146 mmol/L Final   • Potassium 2018 3.9  3.5 - 5.5 mmol/L Final   • Chloride 2018 103  99 - 109 mmol/L Final   • CO2 2018 24.0  20.0 - 31.0 mmol/L Final   • Calcium 2018 8.8  8.7 - 10.4 mg/dL Final   • Total Protein 2018 6.5  5.7 - 8.2 g/dL Final   • Albumin 2018 3.90  3.20 - 4.80 g/dL Final   • ALT (SGPT) 2018 52* 7 - 40 U/L Final   • AST (SGOT) 2018 28  0 - 33 U/L Final   • Alkaline Phosphatase 2018 59  25 - 100 U/L Final   • Total Bilirubin 2018 0.5  0.3 - 1.2  mg/dL Final   • eGFR Non African Amer 03/30/2018 80  >60 mL/min/1.73 Final   • Globulin 03/30/2018 2.6  gm/dL Final   • A/G Ratio 03/30/2018 1.5  1.5 - 2.5 g/dL Final   • BUN/Creatinine Ratio 03/30/2018 27.8* 7.0 - 25.0 Final   • Anion Gap 03/30/2018 13.0* 3.0 - 11.0 mmol/L Final   • Lipase 03/30/2018 27  6 - 51 U/L Final   • Fecal Occult Blood 03/30/2018 Positive* Negative Final   • Lot Number 03/30/2018 [id number]   Final   • Expiration Date 03/30/2018 5-20   Final   • DEVELOPER LOT NUMBER 03/30/2018 [id number]   Final   • DEVELOPER EXPIRATION DATE 03/30/2018 2,020-2   Final   • Positive Control 03/30/2018 Positive  Positive Final   • Negative Control 03/30/2018 Negative  Negative Final   • Lactate 03/30/2018 2.1* 0.5 - 2.0 mmol/L Final    Falsely depressed results may occur on samples drawn from patients receiving N-Acetylcysteine (NAC) or Metamizole.   • WBC 03/30/2018 10.49  3.50 - 10.80 10*3/mm3 Final   • RBC 03/30/2018 3.88* 4.20 - 5.76 10*6/mm3 Final   • Hemoglobin 03/30/2018 11.7* 13.1 - 17.5 g/dL Final   • Hematocrit 03/30/2018 36.7* 38.9 - 50.9 % Final   • MCV 03/30/2018 94.6  80.0 - 99.0 fL Final   • MCH 03/30/2018 30.2  27.0 - 31.0 pg Final   • MCHC 03/30/2018 31.9* 32.0 - 36.0 g/dL Final   • RDW 03/30/2018 13.4  11.3 - 14.5 % Final   • RDW-SD 03/30/2018 46.3  37.0 - 54.0 fl Final   • MPV 03/30/2018 10.4  6.0 - 12.0 fL Final   • Platelets 03/30/2018 330  150 - 450 10*3/mm3 Final   • Neutrophil % 03/30/2018 91.9* 41.0 - 71.0 % Final   • Lymphocyte % 03/30/2018 2.5* 24.0 - 44.0 % Final   • Monocyte % 03/30/2018 4.6  0.0 - 12.0 % Final   • Eosinophil % 03/30/2018 0.5  0.0 - 3.0 % Final   • Basophil % 03/30/2018 0.1  0.0 - 1.0 % Final   • Immature Grans % 03/30/2018 0.4  0.0 - 0.6 % Final   • Neutrophils, Absolute 03/30/2018 9.65* 1.50 - 8.30 10*3/mm3 Final   • Lymphocytes, Absolute 03/30/2018 0.26* 0.60 - 4.80 10*3/mm3 Final   • Monocytes, Absolute 03/30/2018 0.48  0.00 - 1.00 10*3/mm3 Final   • Eosinophils,  Absolute 03/30/2018 0.05  0.00 - 0.30 10*3/mm3 Final   • Basophils, Absolute 03/30/2018 0.01  0.00 - 0.20 10*3/mm3 Final   • Immature Grans, Absolute 03/30/2018 0.04* 0.00 - 0.03 10*3/mm3 Final   • Extra Tube 03/30/2018 Hold for add-ons.   Final    Auto resulted.   • Lactate 03/30/2018 1.7  0.5 - 2.0 mmol/L Final    Falsely depressed results may occur on samples drawn from patients receiving N-Acetylcysteine (NAC) or Metamizole.   • Color, UA 03/31/2018 Yellow  Yellow, Straw Final   • Appearance, UA 03/31/2018 Clear  Clear Final   • pH, UA 03/31/2018 7.0  5.0 - 8.0 Final   • Specific Gravity, UA 03/31/2018 1.026  1.001 - 1.030 Final   • Glucose, UA 03/31/2018 Negative  Negative Final   • Ketones, UA 03/31/2018 Negative  Negative Final   • Bilirubin, UA 03/31/2018 Negative  Negative Final   • Blood, UA 03/31/2018 Negative  Negative Final   • Protein, UA 03/31/2018 Negative  Negative Final   • Leuk Esterase, UA 03/31/2018 Negative  Negative Final   • Nitrite, UA 03/31/2018 Negative  Negative Final   • Urobilinogen, UA 03/31/2018 1.0 E.U./dL  0.2 - 1.0 E.U./dL Final   • WBC 03/31/2018 6.87  3.50 - 10.80 10*3/mm3 Final   • RBC 03/31/2018 3.27* 4.20 - 5.76 10*6/mm3 Final   • Hemoglobin 03/31/2018 9.9* 13.1 - 17.5 g/dL Final   • Hematocrit 03/31/2018 32.0* 38.9 - 50.9 % Final   • MCV 03/31/2018 97.9  80.0 - 99.0 fL Final   • MCH 03/31/2018 30.3  27.0 - 31.0 pg Final   • MCHC 03/31/2018 30.9* 32.0 - 36.0 g/dL Final   • RDW 03/31/2018 13.7  11.3 - 14.5 % Final   • RDW-SD 03/31/2018 48.9  37.0 - 54.0 fl Final   • MPV 03/31/2018 9.4  6.0 - 12.0 fL Final   • Platelets 03/31/2018 262  150 - 450 10*3/mm3 Final   • Glucose 03/31/2018 92  70 - 100 mg/dL Final   • BUN 03/31/2018 18  9 - 23 mg/dL Final   • Creatinine 03/31/2018 1.00  0.60 - 1.30 mg/dL Final   • Sodium 03/31/2018 139  132 - 146 mmol/L Final   • Potassium 03/31/2018 3.6  3.5 - 5.5 mmol/L Final   • Chloride 03/31/2018 111* 99 - 109 mmol/L Final   • CO2 03/31/2018 23.0   20.0 - 31.0 mmol/L Final   • Calcium 03/31/2018 7.7* 8.7 - 10.4 mg/dL Final   • eGFR Non African Amer 03/31/2018 71  >60 mL/min/1.73 Final   • BUN/Creatinine Ratio 03/31/2018 18.0  7.0 - 25.0 Final   • Anion Gap 03/31/2018 5.0  3.0 - 11.0 mmol/L Final   • Hemoglobin 04/01/2018 11.1* 13.1 - 17.5 g/dL Final   • Hematocrit 04/01/2018 34.5* 38.9 - 50.9 % Final         Assessment/Plan     Problem List Items Addressed This Visit        Nervous and Auditory    PSP (progressive supranuclear palsy) (CMS/HCC) - Primary    Current Assessment & Plan     No new or worsening sx.  Frequent falls at night          Dementia associated with other underlying disease with behavioral disturbance    Current Assessment & Plan     Psychological condition is unchanged.  Continue current treatment regimen.  Psychological condition  will be reassessed at the next regular appointment.         Relevant Medications    donepezil (ARICEPT) 5 MG tablet    mirtazapine (REMERON) 30 MG tablet       Other    PBA (pseudobulbar affect)    Current Assessment & Plan     Sx persistent   Increase Remeron 30 mg qhs                   No Follow-up on file.

## 2018-12-06 NOTE — ASSESSMENT & PLAN NOTE
No new or worsening sx.  Frequent falls at night    Occupational Therapy      Patient Name:  Yousif Cortes   MRN:  [id number]    Patient not seen today secondary to Unavailable (pt taking bottle upon first attempt, sleeping upon second). Will follow-up Thursday.    LATIA Dunne  2018

## 2019-01-11 RX ORDER — DONEPEZIL HYDROCHLORIDE 5 MG/1
TABLET, FILM COATED ORAL
Qty: 30 TABLET | Refills: 2 | Status: SHIPPED | OUTPATIENT
Start: 2019-01-11 | End: 2019-04-12 | Stop reason: SDUPTHER

## 2019-01-11 NOTE — TELEPHONE ENCOUNTER
Sent in     donepezil (ARICEPT) 5 MG tablet [[id number]]   Order Details   Dose: 5 mg Route: Oral Frequency: Nightly   Dispense Quantity: 30 tablet Refills: 2          Sent to [facility]

## 2019-02-07 ENCOUNTER — HOSPITAL ENCOUNTER (EMERGENCY)
Facility: HOSPITAL | Age: 84
Discharge: HOME | End: 2019-02-08
Attending: EMERGENCY MEDICINE | Admitting: EMERGENCY MEDICINE

## 2019-02-07 ENCOUNTER — APPOINTMENT (OUTPATIENT)
Dept: GENERAL RADIOLOGY | Facility: HOSPITAL | Age: 84
End: 2019-02-07

## 2019-02-07 ENCOUNTER — APPOINTMENT (OUTPATIENT)
Dept: CT IMAGING | Facility: HOSPITAL | Age: 84
End: 2019-02-07

## 2019-02-07 VITALS
HEIGHT: 69 IN | BODY MASS INDEX: 24.44 KG/M2 | OXYGEN SATURATION: 96 % | DIASTOLIC BLOOD PRESSURE: 72 MMHG | TEMPERATURE: 98.6 F | WEIGHT: 165 LBS | SYSTOLIC BLOOD PRESSURE: 123 MMHG | RESPIRATION RATE: 16 BRPM | HEART RATE: 76 BPM

## 2019-02-07 DIAGNOSIS — E86.9 VOLUME DEPLETION: ICD-10-CM
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
## 2019-02-07 LAB
ALBUMIN SERPL-MCNC: 3.79 G/DL (ref 3.2–4.8)
ALBUMIN/GLOB SERPL: 1.6 G/DL (ref 1.5–2.5)
ALP SERPL-CCNC: 62 U/L (ref 25–100)
ALT SERPL W P-5'-P-CCNC: 29 U/L (ref 7–40)
ANION GAP SERPL CALCULATED.3IONS-SCNC: 5 MMOL/L (ref 3–11)
AST SERPL-CCNC: 40 U/L (ref 0–33)
BACTERIA UR QL AUTO: NORMAL /HPF
BASOPHILS # BLD AUTO: 0.04 10*3/MM3 (ref 0–0.2)
BASOPHILS NFR BLD AUTO: 0.6 % (ref 0–1)
BILIRUB SERPL-MCNC: 0.4 MG/DL (ref 0.3–1.2)
BILIRUB UR QL STRIP: NEGATIVE
BUN BLD-MCNC: 25 MG/DL (ref 9–23)
BUN/CREAT SERPL: 18.8 (ref 7–25)
CALCIUM SPEC-SCNC: 8.7 MG/DL (ref 8.7–10.4)
CHLORIDE SERPL-SCNC: 109 MMOL/L (ref 99–109)
CLARITY UR: ABNORMAL
CO2 SERPL-SCNC: 24 MMOL/L (ref 20–31)
COLOR UR: YELLOW
CREAT BLD-MCNC: 1.33 MG/DL (ref 0.6–1.3)
DEPRECATED RDW RBC AUTO: 46.4 FL (ref 37–54)
EOSINOPHIL # BLD AUTO: 0.15 10*3/MM3 (ref 0–0.3)
EOSINOPHIL NFR BLD AUTO: 2.2 % (ref 0–3)
ERYTHROCYTE [DISTWIDTH] IN BLOOD BY AUTOMATED COUNT: 13.9 % (ref 11.3–14.5)
GFR SERPL CREATININE-BSD FRML MDRD: 51 ML/MIN/1.73
GLOBULIN UR ELPH-MCNC: 2.4 GM/DL
GLUCOSE BLD-MCNC: 113 MG/DL (ref 70–100)
GLUCOSE UR STRIP-MCNC: NEGATIVE MG/DL
HCT VFR BLD AUTO: 38.3 % (ref 38.9–50.9)
HGB BLD-MCNC: 12.5 G/DL (ref 13.1–17.5)
HGB UR QL STRIP.AUTO: NEGATIVE
HYALINE CASTS UR QL AUTO: NORMAL /LPF
IMM GRANULOCYTES # BLD AUTO: 0.02 10*3/MM3 (ref 0–0.03)
IMM GRANULOCYTES NFR BLD AUTO: 0.3 % (ref 0–0.6)
KETONES UR QL STRIP: ABNORMAL
LEUKOCYTE ESTERASE UR QL STRIP.AUTO: NEGATIVE
LYMPHOCYTES # BLD AUTO: 1.58 10*3/MM3 (ref 0.6–4.8)
LYMPHOCYTES NFR BLD AUTO: 23.6 % (ref 24–44)
MCH RBC QN AUTO: 30 PG (ref 27–31)
MCHC RBC AUTO-ENTMCNC: 32.6 G/DL (ref 32–36)
MCV RBC AUTO: 92.1 FL (ref 80–99)
MONOCYTES # BLD AUTO: 0.77 10*3/MM3 (ref 0–1)
MONOCYTES NFR BLD AUTO: 11.5 % (ref 0–12)
NEUTROPHILS # BLD AUTO: 4.15 10*3/MM3 (ref 1.5–8.3)
NEUTROPHILS NFR BLD AUTO: 62.1 % (ref 41–71)
NITRITE UR QL STRIP: NEGATIVE
PH UR STRIP.AUTO: <=5 [PH] (ref 5–8)
PLATELET # BLD AUTO: 286 10*3/MM3 (ref 150–450)
PMV BLD AUTO: 10.1 FL (ref 6–12)
POTASSIUM BLD-SCNC: 4.4 MMOL/L (ref 3.5–5.5)
PROT SERPL-MCNC: 6.2 G/DL (ref 5.7–8.2)
PROT UR QL STRIP: ABNORMAL
RBC # BLD AUTO: 4.16 10*6/MM3 (ref 4.2–5.76)
RBC # UR: NORMAL /HPF
REF LAB TEST METHOD: NORMAL
SODIUM BLD-SCNC: 138 MMOL/L (ref 132–146)
SP GR UR STRIP: >=1.03 (ref 1–1.03)
SQUAMOUS #/AREA URNS HPF: NORMAL /HPF
TROPONIN I SERPL-MCNC: 0 NG/ML (ref 0–0.07)
TROPONIN I SERPL-MCNC: 0.03 NG/ML
UROBILINOGEN UR QL STRIP: ABNORMAL
WBC NRBC COR # BLD: 6.69 10*3/MM3 (ref 3.5–10.8)
WBC UR QL AUTO: NORMAL /HPF

## 2019-02-07 PROCEDURE — 93005 ELECTROCARDIOGRAM TRACING: CPT | Performed by: EMERGENCY MEDICINE

## 2019-02-07 PROCEDURE — 80053 COMPREHEN METABOLIC PANEL: CPT | Performed by: EMERGENCY MEDICINE

## 2019-02-07 PROCEDURE — 84484 ASSAY OF TROPONIN QUANT: CPT

## 2019-02-07 PROCEDURE — 84484 ASSAY OF TROPONIN QUANT: CPT | Performed by: EMERGENCY MEDICINE

## 2019-02-07 PROCEDURE — 85025 COMPLETE CBC W/AUTO DIFF WBC: CPT | Performed by: EMERGENCY MEDICINE

## 2019-02-07 PROCEDURE — 70450 CT HEAD/BRAIN W/O DYE: CPT

## 2019-02-07 PROCEDURE — 73502 X-RAY EXAM HIP UNI 2-3 VIEWS: CPT

## 2019-02-07 PROCEDURE — 96360 HYDRATION IV INFUSION INIT: CPT

## 2019-02-07 PROCEDURE — 99284 EMERGENCY DEPT VISIT MOD MDM: CPT

## 2019-02-07 PROCEDURE — 71045 X-RAY EXAM CHEST 1 VIEW: CPT

## 2019-02-07 PROCEDURE — 81001 URINALYSIS AUTO W/SCOPE: CPT | Performed by: EMERGENCY MEDICINE

## 2019-02-07 RX ADMIN — SODIUM CHLORIDE 1000 ML: 9 INJECTION, SOLUTION INTRAVENOUS at 21:43

## 2019-02-07 NOTE — ED PROVIDER NOTES
"Subjective   Dakota Brizuela is a 88 y.o.male who presents to the ED with c/o injuries sustained s/p fall. He has a h/o dementia, PBA, PSP and is not able to provide an adequate history. He states that he fell but does not know the mechanism of his fall. He denies any complaints of pain and states that he feels \"pretty good\". Upon repeatedly asking if he has any pain he is able to tell that he has right knee pain but does not worsen with movement of his right leg and denies headache, neck pain, back pain, upper extremity pain, chest pain, abdominal pain or left lower extremity pain. Per nursing staff, he was sent to be evaluated for a possible concussion but his daughter is not currently at bedside.             History provided by:  Patient  History limited by:  Dementia  Fall   Mechanism of injury: fall    Incident location:  Unable to specify  Fall:     Fall occurred:  Unable to specify    Impact surface:  Unable to specify    Point of impact:  Unable to specify  Protective equipment: none    Suspicion of alcohol use: no    Suspicion of drug use: no    Tetanus status:  Unknown  Prior to arrival data:     Blood loss:  None    Airway interventions:  None    Breathing interventions:  None    IV access status:  None    IO access:  None    Fluids administered:  None    Cardiac interventions:  None    Medications administered:  None    Immobilization:  None  Associated symptoms: no abdominal pain, no back pain, no chest pain, no difficulty breathing, no headaches, no loss of consciousness and no neck pain    Risk factors: no anticoagulation therapy        Review of Systems   Unable to perform ROS: Dementia   Cardiovascular: Negative for chest pain.   Gastrointestinal: Negative for abdominal pain.   Musculoskeletal: Positive for arthralgias. Negative for back pain and neck pain.   Neurological: Negative for loss of consciousness and headaches.       Past Medical History:   Diagnosis Date   • Arthritis    • BPH (benign " prostatic hyperplasia)    • Cancer (CMS/HCC)    • GERD (gastroesophageal reflux disease)    • Hyperlipidemia    • Hypertension    • Progressive supranuclear ophthalmoplegia (CMS/HCC)    • Rhabdomyolysis    • Thyroid disease        Allergies   Allergen Reactions   • Aleve [Naproxen Sodium] Unknown (See Comments)     Unknown       Past Surgical History:   Procedure Laterality Date   • APPENDECTOMY     • ENDOSCOPY N/A 3/30/2018    Procedure: ESOPHAGOGASTRODUODENOSCOPY;  Surgeon: Mark I Brunner, MD;  Location: [facility] ENDOSCOPY;  Service: Gastroenterology   • PROSTATE SURGERY         Family History   Problem Relation Age of Onset   • No Known Problems Mother    • No Known Problems Father    • Heart attack Brother        Social History     Socioeconomic History   • Marital status:      Spouse name: Not on file   • Number of children: Not on file   • Years of education: Not on file   • Highest education level: Not on file   Tobacco Use   • Smoking status: Never Smoker   • Smokeless tobacco: Never Used   Substance and Sexual Activity   • Alcohol use: Yes     Comment: 1/2 glass red wine    • Drug use: No   • Sexual activity: Defer         Objective   Physical Exam   Constitutional: He appears well-developed and well-nourished. No distress.   HENT:   Head: Normocephalic and atraumatic.   Right Ear: External ear normal.   Left Ear: External ear normal.   Nose: Nose normal.   Eyes: Conjunctivae are normal. No scleral icterus.   Neck: Normal range of motion. Neck supple.   Cardiovascular: Normal rate, regular rhythm and normal heart sounds. Exam reveals no friction rub.   No murmur heard.  Pulmonary/Chest: Effort normal and breath sounds normal. No respiratory distress. He has no wheezes. He has no rales.   Abdominal: Soft. Bowel sounds are normal. He exhibits no distension. There is no tenderness.   Musculoskeletal: Normal range of motion. He exhibits no edema, tenderness or deformity.   Right leg is shorter than left  leg but allows flexion and does not have any significant pain.   No C, T, L spine tenderness.    Neurological: He is alert.   He is not about to provide any significant history.    Skin: Skin is warm and dry. He is not diaphoretic.   Psychiatric: He has a normal mood and affect. His behavior is normal.   Nursing note and vitals reviewed.      Procedures         ED Course  ED Course as of Feb 08 1144   Thu Feb 07, 2019 2124 I spoke with Mr. Pack's daughter who lives with them.  She tells me she's fallen twice in 24 hours.  He has supranuclear palsy and she tells me his gait is severely affected and he does have a tendency to fall but it's unusual for him to fall twice in 24 hours.  Will give IV fluids and perform catheterized urine.  [DT]   2230 I spoke with Mr. Pack.  He seems a little more alert and awake after a liter of fluids.  His daughter is gone home to get him a coat and when she returns I will discharge him with her.  In the meantime we'll give additional IV fluids.  Current blood pressures 140.  Second troponin is 0  [DT]      ED Course User Index  [DT] Kenney Diaz MD     Recent Results (from the past 24 hour(s))   CBC Auto Differential    Collection Time: 02/07/19  7:07 PM   Result Value Ref Range    WBC 6.69 3.50 - 10.80 10*3/mm3    RBC 4.16 (L) 4.20 - 5.76 10*6/mm3    Hemoglobin 12.5 (L) 13.1 - 17.5 g/dL    Hematocrit 38.3 (L) 38.9 - 50.9 %    MCV 92.1 80.0 - 99.0 fL    MCH 30.0 27.0 - 31.0 pg    MCHC 32.6 32.0 - 36.0 g/dL    RDW 13.9 11.3 - 14.5 %    RDW-SD 46.4 37.0 - 54.0 fl    MPV 10.1 6.0 - 12.0 fL    Platelets 286 150 - 450 10*3/mm3    Neutrophil % 62.1 41.0 - 71.0 %    Lymphocyte % 23.6 (L) 24.0 - 44.0 %    Monocyte % 11.5 0.0 - 12.0 %    Eosinophil % 2.2 0.0 - 3.0 %    Basophil % 0.6 0.0 - 1.0 %    Immature Grans % 0.3 0.0 - 0.6 %    Neutrophils, Absolute 4.15 1.50 - 8.30 10*3/mm3    Lymphocytes, Absolute 1.58 0.60 - 4.80 10*3/mm3    Monocytes, Absolute 0.77 0.00 - 1.00 10*3/mm3     Eosinophils, Absolute 0.15 0.00 - 0.30 10*3/mm3    Basophils, Absolute 0.04 0.00 - 0.20 10*3/mm3    Immature Grans, Absolute 0.02 0.00 - 0.03 10*3/mm3   Comprehensive Metabolic Panel    Collection Time: 02/07/19  7:08 PM   Result Value Ref Range    Glucose 113 (H) 70 - 100 mg/dL    BUN 25 (H) 9 - 23 mg/dL    Creatinine 1.33 (H) 0.60 - 1.30 mg/dL    Sodium 138 132 - 146 mmol/L    Potassium 4.4 3.5 - 5.5 mmol/L    Chloride 109 99 - 109 mmol/L    CO2 24.0 20.0 - 31.0 mmol/L    Calcium 8.7 8.7 - 10.4 mg/dL    Total Protein 6.2 5.7 - 8.2 g/dL    Albumin 3.79 3.20 - 4.80 g/dL    ALT (SGPT) 29 7 - 40 U/L    AST (SGOT) 40 (H) 0 - 33 U/L    Alkaline Phosphatase 62 25 - 100 U/L    Total Bilirubin 0.4 0.3 - 1.2 mg/dL    eGFR Non African Amer 51 (L) >60 mL/min/1.73    Globulin 2.4 gm/dL    A/G Ratio 1.6 1.5 - 2.5 g/dL    BUN/Creatinine Ratio 18.8 7.0 - 25.0    Anion Gap 5.0 3.0 - 11.0 mmol/L   Troponin    Collection Time: 02/07/19  7:08 PM   Result Value Ref Range    Troponin I 0.033 <=0.039 ng/mL   Urinalysis With Microscopic If Indicated (No Culture) - Urine, Clean Catch    Collection Time: 02/07/19  9:53 PM   Result Value Ref Range    Color, UA Yellow Yellow, Straw    Appearance, UA Cloudy (A) Clear    pH, UA <=5.0 5.0 - 8.0    Specific Gravity, UA >=1.030 1.001 - 1.030    Glucose, UA Negative Negative    Ketones, UA Trace (A) Negative    Bilirubin, UA Negative Negative    Blood, UA Negative Negative    Protein, UA Trace (A) Negative    Leuk Esterase, UA Negative Negative    Nitrite, UA Negative Negative    Urobilinogen, UA 1.0 E.U./dL 0.2 - 1.0 E.U./dL   Urinalysis, Microscopic Only - Urine, Clean Catch    Collection Time: 02/07/19  9:53 PM   Result Value Ref Range    RBC, UA 0-2 None Seen, 0-2 /HPF    WBC, UA 0-2 None Seen, 0-2 /HPF    Bacteria, UA None Seen None Seen, Trace /HPF    Squamous Epithelial Cells, UA 0-2 None Seen, 0-2 /HPF    Hyaline Casts, UA 0-6 0 - 6 /LPF    Methodology Automated Microscopy    POC Troponin,  Rapid    Collection Time: 02/07/19 10:20 PM   Result Value Ref Range    Troponin I 0.00 0.00 - 0.07 ng/mL     Note: In addition to lab results from this visit, the labs listed above may include labs taken at another facility or during a different encounter within the last 24 hours. Please correlate lab times with ED admission and discharge times for further clarification of the services performed during this visit.    CT Head Without Contrast   Final Result   Advanced generalized cerebral atrophy. No evidence of acute   intracranial disease. No evidence of acute trauma.       DICTATED:   2/7/2019   EDITED/ls :   2/7/2019        This report was finalized on 2/7/2019 10:25 PM by DR. Thanh Meadows MD.          XR Chest 1 View   Final Result   No active lung parenchymal lesion.       THIS DOCUMENT HAS BEEN ELECTRONICALLY SIGNED BY ATUL BUTLER MD      XR Hip With or Without Pelvis 2 - 3 View Right   Final Result   Osteopenia, chronic degenerative changes without acute bony abnormality or    injury.       THIS DOCUMENT HAS BEEN ELECTRONICALLY SIGNED BY ATUL BUTLER MD        Vitals:    02/07/19 2158 02/07/19 2159 02/07/19 2245 02/07/19 2259   BP:       Patient Position:       Pulse:  72 76 76   Resp:       Temp:       TempSrc:       SpO2: 94% 95% 95% 96%   Weight:       Height:         Medications   sodium chloride 0.9 % bolus 1,000 mL (0 mL Intravenous Stopped 2/8/19 0012)     ECG/EMG Results (last 24 hours)     ** No results found for the last 24 hours. **        ECG 12 Lead         ECG 12 Lead                             MDM  Number of Diagnoses or Management Options  Fall in home, initial encounter: new and requires workup  Volume depletion: new and requires workup     Amount and/or Complexity of Data Reviewed  Clinical lab tests: reviewed and ordered  Tests in the radiology section of CPT®: ordered and reviewed  Decide to obtain previous medical records or to obtain history from someone other than the patient:  yes  Obtain history from someone other than the patient: yes  Independent visualization of images, tracings, or specimens: yes    Patient Progress  Patient progress: improved      Final diagnoses:   Fall in home, initial encounter   Volume depletion       Documentation assistance provided by claudia Hamilton.  Information recorded by the scribe was done at my direction and has been verified and validated by me.     Quentin Hamilton  02/07/19 8910       Kenney Diaz MD  02/08/19 1141

## 2019-04-15 RX ORDER — DONEPEZIL HYDROCHLORIDE 5 MG/1
TABLET, FILM COATED ORAL
Qty: 90 TABLET | Refills: 1 | Status: SHIPPED | OUTPATIENT
Start: 2019-04-15

## 2019-04-23 ENCOUNTER — OFFICE VISIT (OUTPATIENT)
Dept: NEUROLOGY | Facility: CLINIC | Age: 84
End: 2019-04-23

## 2019-04-23 VITALS
HEART RATE: 60 BPM | HEIGHT: 69 IN | WEIGHT: 165 LBS | DIASTOLIC BLOOD PRESSURE: 64 MMHG | SYSTOLIC BLOOD PRESSURE: 98 MMHG | RESPIRATION RATE: 16 BRPM | OXYGEN SATURATION: 98 % | BODY MASS INDEX: 24.44 KG/M2

## 2019-04-23 DIAGNOSIS — F48.2 PBA (PSEUDOBULBAR AFFECT): ICD-10-CM

## 2019-04-23 DIAGNOSIS — G23.1 PSP (PROGRESSIVE SUPRANUCLEAR PALSY) (HCC): Primary | ICD-10-CM

## 2019-04-23 DIAGNOSIS — F02.818 DEMENTIA ASSOCIATED WITH OTHER UNDERLYING DISEASE WITH BEHAVIORAL DISTURBANCE (HCC): ICD-10-CM

## 2019-04-23 PROCEDURE — 99214 OFFICE O/P EST MOD 30 MIN: CPT | Performed by: PSYCHIATRY & NEUROLOGY

## 2019-04-23 RX ORDER — IBUPROFEN 200 MG
200 TABLET ORAL EVERY 6 HOURS PRN
COMMUNITY

## 2019-04-23 NOTE — ASSESSMENT & PLAN NOTE
Psychological condition is worsening.  Continue current treatment regimen.  Psychological condition  will be reassessed at the next regular appointment.

## 2019-04-23 NOTE — PROGRESS NOTES
Subjective   Patient ID: Dakota Brizuela is a 88 y.o. male     Chief Complaint   Patient presents with   • PSP        History of Present Illness    88 y.o. male returns in follow up for Parkinsonism/PSP, dementia and PBA.  Last visit on 10/11/18 stopped Sinemet and Nuedexta, added Aricept and Remeron    PSP    Worsening gait with walker,  Needs transport chair    Dementia     Impulsive and has poor judgement.  Vivid dreaming and acting out dreams.      Dtr notes improved ST memory.      PBA     Sleeping improved on Remeron.      Problem history:    Dx with PSP by Dr Colin 2015.      Presented with frequent falls and trouble speaking  Stopped driving a year.  Playing bridge up to a year ago.  Started using a walker 2017.      ST memory started declining a year ago.      Living at home with Dtr.  Dtr started making all meals 18 months.  Cannot use coffee pot, or microwave.  Able to dress himself.  Assistant comes in to do bathing twice a week.  Can brush his teeth.      Steps are short shuffling, festinating gait and freezing.      Occasional anger outbursts.      Current sx of emotional lability of laughing and crying.      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  [facility] R hemicolectomy partial small bowel resection    Stopped playing golf 2014 and started using walking stick.  Treated for OA of knees Ortho    3/20/18 fell and remained on floor for 12 hours.  Noted to have increasing confusion.  Traumatic rhabdo.  CXR bilat infitrates tx with Zosyn.  Speech eval recs aspiration precautions.  Discharged to [facility].     HCT, my review of films, moderate atrophy     Admitted 3/30/18 - 4/1/18 for hematemesis.    Past Medical History:   Diagnosis Date   • Arthritis    • BPH (benign prostatic hyperplasia)    • Cancer (CMS/HCC)    • GERD (gastroesophageal reflux disease)    • Hyperlipidemia    • Hypertension    • Progressive supranuclear  ophthalmoplegia (CMS/HCC)    • Rhabdomyolysis    • Thyroid disease      Family History   Problem Relation Age of Onset   • No Known Problems Mother    • No Known Problems Father    • Heart attack Brother      Social History     Socioeconomic History   • Marital status:      Spouse name: Not on file   • Number of children: Not on file   • Years of education: Not on file   • Highest education level: Not on file   Tobacco Use   • Smoking status: Never Smoker   • Smokeless tobacco: Never Used   Substance and Sexual Activity   • Alcohol use: Yes     Comment: 1/2 glass red wine    • Drug use: No   • Sexual activity: Defer       Review of Systems   Constitutional: Positive for fatigue. Negative for activity change and unexpected weight change.   HENT: Positive for trouble swallowing. Negative for facial swelling, hearing loss, tinnitus and voice change.    Eyes: Positive for visual disturbance. Negative for photophobia and pain.   Respiratory: Negative for apnea, cough and choking.    Cardiovascular: Negative for chest pain.   Gastrointestinal: Negative for constipation, nausea and vomiting.   Endocrine: Negative for cold intolerance.   Genitourinary: Negative for difficulty urinating, frequency and urgency.   Musculoskeletal: Positive for gait problem. Negative for arthralgias, back pain, myalgias, neck pain and neck stiffness.   Skin: Negative for rash.   Allergic/Immunologic: Negative for immunocompromised state.   Neurological: Positive for weakness. Negative for dizziness, tremors, seizures, syncope, facial asymmetry, speech difficulty, light-headedness, numbness and headaches.   Hematological: Negative for adenopathy.   Psychiatric/Behavioral: Positive for confusion and decreased concentration. Negative for hallucinations and sleep disturbance. The patient is not nervous/anxious.        Objective     Vitals:    04/23/19 1358   BP: 98/64   BP Location: Left arm   Patient Position: Sitting   Cuff Size: Adult  "  Pulse: 60   Resp: 16   SpO2: 98%   Weight: 74.8 kg (165 lb)   Height: 175.3 cm (69\")       Neurologic Exam     Mental Status   Oriented to person.   Disoriented to place. Disoriented to street and number. Oriented to country.   Oriented to time. Disoriented to date. Oriented to year, month, day and season.   Follows 1 step commands.   Attention: decreased. Concentration: decreased.   Level of consciousness: alert  Knowledge: poor and inconsistent with education. Unable to perform simple calculations.   Unable to name object. Unable to read. Unable to repeat. Unable to write. Abnormal comprehension.     Cranial Nerves     CN II   Visual fields full to confrontation.   Visual acuity: normal  Right visual field deficit: none  Left visual field deficit: none     CN III, IV, VI   Right pupil: Shape: regular. Reactivity: brisk. Consensual response: intact.   Left pupil: Shape: regular. Reactivity: brisk. Consensual response: intact.   Nystagmus: none   Diplopia: none  Ophthalmoparesis: none  Upgaze: abnormal  Downgaze: abnormal  Conjugate gaze: present  Vestibulo-ocular reflex: absent    CN V   Facial sensation intact.   Right corneal reflex: normal  Left corneal reflex: normal    CN VII   Right facial weakness: central  Left facial weakness: central    CN VIII   Hearing: intact    CN IX, X   Palate: symmetric  Right gag reflex: normal  Left gag reflex: normal    CN XI   Right sternocleidomastoid strength: normal  Left sternocleidomastoid strength: normal    CN XII   Tongue: not atrophic  Fasciculations: absent  Tongue deviation: none    Motor Exam   Muscle bulk: normal  Overall muscle tone: increased  Right arm tone: increased  Left arm tone: increased  Right leg tone: increased  Left leg tone: increased    Strength   Right neck flexion: 4/5  Left neck flexion: 4/5  Right neck extension: 4/5  Left neck extension: 4/5  Right deltoid: 4/5  Left deltoid: 4/5  Right biceps: 4/5  Left biceps: 4/5  Right triceps: 4/5  Left " triceps: 4/5  Right wrist flexion: 4/5  Left wrist flexion: 4/5  Right wrist extension: 4/5  Left wrist extension: 4/5  Right interossei: 4/5  Left interossei: 4/5  Right abdominals: 4/5  Left abdominals: 4/5  Right iliopsoas: 4/5  Left iliopsoas: 4/5  Right quadriceps: 4/5  Left quadriceps: 4/5  Right hamstrin/5  Left hamstrin/5  Right glutei: 4/5  Left glutei: 4/5  Right anterior tibial: 4/5  Left anterior tibial: 4/5  Right posterior tibial: 4/5  Left posterior tibial: 4/5  Right peroneal: 4/5  Left peroneal: 4/5  Right gastroc: 4/5  Left gastroc: 4/5    Sensory Exam   Light touch normal.   Vibration normal.   Proprioception normal.   Pinprick normal.     Gait, Coordination, and Reflexes     Gait  Gait: shuffling and wide-based    Coordination   Romberg: positive  Tandem walking coordination: abnormal    Tremor   Resting tremor: absent  Intention tremor: absent  Action tremor: absent    Reflexes   Reflexes 2+ except as noted.       Physical Exam   Constitutional: He appears well-developed and well-nourished.   Neurological: He has an abnormal Romberg Test and an abnormal Tandem Gait Test.   Nursing note and vitals reviewed.      Admission on 2019, Discharged on 2019   Component Date Value Ref Range Status   • Glucose 2019 113* 70 - 100 mg/dL Final   • BUN 2019 25* 9 - 23 mg/dL Final   • Creatinine 2019 1.33* 0.60 - 1.30 mg/dL Final   • Sodium 2019 138  132 - 146 mmol/L Final   • Potassium 2019 4.4  3.5 - 5.5 mmol/L Final   • Chloride 2019 109  99 - 109 mmol/L Final   • CO2 2019 24.0  20.0 - 31.0 mmol/L Final   • Calcium 2019 8.7  8.7 - 10.4 mg/dL Final   • Total Protein 2019 6.2  5.7 - 8.2 g/dL Final   • Albumin 2019 3.79  3.20 - 4.80 g/dL Final   • ALT (SGPT) 2019 29  7 - 40 U/L Final   • AST (SGOT) 2019 40* 0 - 33 U/L Final   • Alkaline Phosphatase 2019 62  25 - 100 U/L Final   • Total Bilirubin 2019 0.4  0.3  - 1.2 mg/dL Final   • eGFR Non African Amer 02/07/2019 51* >60 mL/min/1.73 Final   • Globulin 02/07/2019 2.4  gm/dL Final   • A/G Ratio 02/07/2019 1.6  1.5 - 2.5 g/dL Final   • BUN/Creatinine Ratio 02/07/2019 18.8  7.0 - 25.0 Final   • Anion Gap 02/07/2019 5.0  3.0 - 11.0 mmol/L Final   • Color, UA 02/07/2019 Yellow  Yellow, Straw Final   • Appearance, UA 02/07/2019 Cloudy* Clear Final   • pH, UA 02/07/2019 <=5.0  5.0 - 8.0 Final   • Specific Gravity, UA 02/07/2019 >=1.030  1.001 - 1.030 Final   • Glucose, UA 02/07/2019 Negative  Negative Final   • Ketones, UA 02/07/2019 Trace* Negative Final   • Bilirubin, UA 02/07/2019 Negative  Negative Final   • Blood, UA 02/07/2019 Negative  Negative Final   • Protein, UA 02/07/2019 Trace* Negative Final   • Leuk Esterase, UA 02/07/2019 Negative  Negative Final   • Nitrite, UA 02/07/2019 Negative  Negative Final   • Urobilinogen, UA 02/07/2019 1.0 E.U./dL  0.2 - 1.0 E.U./dL Final   • Troponin I 02/07/2019 0.033  <=0.039 ng/mL Final    Results may be falsely decreased if patient taking Biotin.   • WBC 02/07/2019 6.69  3.50 - 10.80 10*3/mm3 Final   • RBC 02/07/2019 4.16* 4.20 - 5.76 10*6/mm3 Final   • Hemoglobin 02/07/2019 12.5* 13.1 - 17.5 g/dL Final   • Hematocrit 02/07/2019 38.3* 38.9 - 50.9 % Final   • MCV 02/07/2019 92.1  80.0 - 99.0 fL Final   • MCH 02/07/2019 30.0  27.0 - 31.0 pg Final   • MCHC 02/07/2019 32.6  32.0 - 36.0 g/dL Final   • RDW 02/07/2019 13.9  11.3 - 14.5 % Final   • RDW-SD 02/07/2019 46.4  37.0 - 54.0 fl Final   • MPV 02/07/2019 10.1  6.0 - 12.0 fL Final   • Platelets 02/07/2019 286  150 - 450 10*3/mm3 Final   • Neutrophil % 02/07/2019 62.1  41.0 - 71.0 % Final   • Lymphocyte % 02/07/2019 23.6* 24.0 - 44.0 % Final   • Monocyte % 02/07/2019 11.5  0.0 - 12.0 % Final   • Eosinophil % 02/07/2019 2.2  0.0 - 3.0 % Final   • Basophil % 02/07/2019 0.6  0.0 - 1.0 % Final   • Immature Grans % 02/07/2019 0.3  0.0 - 0.6 % Final   • Neutrophils, Absolute 02/07/2019 4.15   1.50 - 8.30 10*3/mm3 Final   • Lymphocytes, Absolute 02/07/2019 1.58  0.60 - 4.80 10*3/mm3 Final   • Monocytes, Absolute 02/07/2019 0.77  0.00 - 1.00 10*3/mm3 Final   • Eosinophils, Absolute 02/07/2019 0.15  0.00 - 0.30 10*3/mm3 Final   • Basophils, Absolute 02/07/2019 0.04  0.00 - 0.20 10*3/mm3 Final   • Immature Grans, Absolute 02/07/2019 0.02  0.00 - 0.03 10*3/mm3 Final   • RBC, UA 02/07/2019 0-2  None Seen, 0-2 /HPF Final   • WBC, UA 02/07/2019 0-2  None Seen, 0-2 /HPF Final   • Bacteria, UA 02/07/2019 None Seen  None Seen, Trace /HPF Final   • Squamous Epithelial Cells, UA 02/07/2019 0-2  None Seen, 0-2 /HPF Final   • Hyaline Casts, UA 02/07/2019 0-6  0 - 6 /LPF Final   • Methodology 02/07/2019 Automated Microscopy   Final   • Troponin I 02/07/2019 0.00  0.00 - 0.07 ng/mL Final    Serial Number: [id number]:  [id number]         Assessment/Plan     Problem List Items Addressed This Visit        Nervous and Auditory    PSP (progressive supranuclear palsy) (CMS/HCC) - Primary    Current Assessment & Plan     Worsening with progressive decline in motor functions and impulsivity.      Unresponsive to dopamine replacement          Dementia associated with other underlying disease with behavioral disturbance    Current Assessment & Plan     Psychological condition is worsening.  Continue current treatment regimen.  Psychological condition  will be reassessed at the next regular appointment.         Relevant Medications    mirtazapine (REMERON) 30 MG tablet    donepezil (ARICEPT) 5 MG tablet       Other    PBA (pseudobulbar affect)    Current Assessment & Plan     Remains bothersome and continue Remeron                   No Follow-up on file.

## 2019-04-23 NOTE — ASSESSMENT & PLAN NOTE
Worsening with progressive decline in motor functions and impulsivity.      Unresponsive to dopamine replacement

## (undated) DEVICE — THE BITE BLOCK MAXI, LATEX FREE STRAP IS USED TO PROTECT THE ENDOSCOPE INSERTION TUBE FROM BEING BITTEN BY THE PATIENT.